# Patient Record
Sex: FEMALE | Race: WHITE | NOT HISPANIC OR LATINO | Employment: FULL TIME | URBAN - METROPOLITAN AREA
[De-identification: names, ages, dates, MRNs, and addresses within clinical notes are randomized per-mention and may not be internally consistent; named-entity substitution may affect disease eponyms.]

---

## 2017-04-25 ENCOUNTER — GENERIC CONVERSION - ENCOUNTER (OUTPATIENT)
Dept: OTHER | Facility: OTHER | Age: 52
End: 2017-04-25

## 2018-01-09 NOTE — MISCELLANEOUS
Message   Recorded as Task   Date: 08/30/2016 01:04 PM, Created By: Doreen Sanches   Task Name: Go to Result   [facility]   Regarding Patient: Holly Callaway, Status: In Progress   Comment:    Doreen Sanches - 30 Aug 2016 1:04 PM     TASK CREATED  pt needs colposcopy, has ASCUS and + HPV 16 ( high risk type of HPV)   Chiquita Palmer - 30 Aug 2016 1:49 PM     TASK IN PROGRESS        Active Problems    1  Encounter for annual routine gynecological examination (V72 31) (Z01 419)   2  Encounter for routine gynecological examination (V72 31) (Z01 419)   3  History of self breast exam   4  Screening for HPV (human papillomavirus) (V73 81) (Z11 51)   5  Urinary urgency (788 63) (R39 15)   6  Visit for screening mammogram (V76 12) (Z12 31)   7  Vulvar itching (698 1) (L29 2)    Current Meds   1  PreviDent 5000 Dry Mouth 1 1 % PSTE; Therapy: 17HBL0559 to (Last Rx:11Nov2011)  Requested for: 13OLQ5131 Ordered   2  SEROquel 300 MG Oral Tablet (QUEtiapine Fumarate); Therapy: 05RBL9708 to (Last Rx:24Oct2011)  Requested for: 66Dje5992 Ordered   3  SUMAtriptan Succinate 100 MG Oral Tablet; Therapy: 86GQO8804 to (Last UA:32EMF5506)  Requested for: 02EUC2629 Ordered   4  Travatan Z 0 004 % Ophthalmic Solution; Therapy: 02IGF2369 to (Last Rx:25Nov2011)  Requested for: 27KFY6365 Ordered   5  Venlafaxine HCl  MG Oral Capsule Extended Release 24 Hour; Therapy: 36BBG4921 to (Last Rx:17Oct2011)  Requested for: 87Apb0512 Ordered   6  Zaleplon 10 MG Oral Capsule; Therapy: (Recorded:50Jfo7069) to Recorded    Allergies    1   No Known Drug Allergies    Signatures   Electronically signed by : Madhu Quick, ; Aug 30 2016  3:35PM EST                       (Author)

## 2018-01-18 NOTE — MISCELLANEOUS
Message   Recorded as Task   Date: 10/27/2016 12:39 PM, Created By: Joss Garcia   Task Name: Go to Result   Assigned To: Consuelo Villavicencio   Regarding Patient: Gosia Green, Status: In Progress   Comment:    Joss Garcia - 27 Oct 2016 12:39 PM     TASK CREATED  normal colposcopy, needs repeat pap and HPV in 1 year   Chiquita Palmer - 27 Oct 2016 2:40 PM     TASK IN PROGRESS   Pt informed  Active Problems    1  Cervical high risk HPV (human papillomavirus) test positive (795 05) (R87 810)   2  Encounter for annual routine gynecological examination (V72 31) (Z01 419)   3  Encounter for routine gynecological examination (V72 31) (Z01 419)   4  History of self breast exam   5  Need for immunization against influenza (V04 81) (Z23)   6  Screening for HPV (human papillomavirus) (V73 81) (Z11 51)   7  Urinary urgency (788 63) (R39 15)   8  Visit for screening mammogram (V76 12) (Z12 31)   9  Vulvar itching (698 1) (L29 2)    Current Meds   1  Nystatin-Triamcinolone 436442-8 1 UNIT/GM-% External Cream; apply sparingly BID for   1 week; Therapy: 21GBC3324 to (Rhona Rice)  Requested for: 22LOM5415; Last   Rx:69Sdy0424 Ordered   2  PreviDent 5000 Dry Mouth 1 1 % PSTE; Therapy: 03HRT9099 to (Last Rx:31Qdo7000)  Requested for: 65PJN8148 Ordered   3  SEROquel 300 MG Oral Tablet (QUEtiapine Fumarate); Therapy: 27AHP0748 to (Last Rx:24Oct2011)  Requested for: 55Phw9287 Ordered   4  SUMAtriptan Succinate 100 MG Oral Tablet; Therapy: 12NVZ3626 to (Last SZ:68ECL3666)  Requested for: 44ICU3695 Ordered   5  Travatan Z 0 004 % Ophthalmic Solution; Therapy: 07ZGV1053 to (Last Rx:25Nov2011)  Requested for: 33KHQ0512 Ordered   6  Venlafaxine HCl  MG Oral Capsule Extended Release 24 Hour; Therapy: 52ELX7485 to (Last Rx:17Oct2011)  Requested for: 17Oct2011 Ordered   7  Zaleplon 10 MG Oral Capsule; Therapy: (Recorded:90Oox2211) to Recorded    Allergies    1   No Known Drug Allergies    Signatures Electronically signed by : Juan Baldwin, ; Oct 28 2016  2:29PM EST                       (Author)

## 2020-08-20 ENCOUNTER — OFFICE VISIT (OUTPATIENT)
Dept: GASTROENTEROLOGY | Facility: MEDICAL CENTER | Age: 55
End: 2020-08-20
Payer: COMMERCIAL

## 2020-08-20 VITALS
WEIGHT: 246.4 LBS | SYSTOLIC BLOOD PRESSURE: 142 MMHG | BODY MASS INDEX: 41.97 KG/M2 | DIASTOLIC BLOOD PRESSURE: 82 MMHG | HEART RATE: 71 BPM | TEMPERATURE: 98.4 F

## 2020-08-20 DIAGNOSIS — Z12.11 SCREENING FOR COLON CANCER: ICD-10-CM

## 2020-08-20 DIAGNOSIS — K59.00 CONSTIPATION, UNSPECIFIED CONSTIPATION TYPE: Primary | ICD-10-CM

## 2020-08-20 PROCEDURE — 99244 OFF/OP CNSLTJ NEW/EST MOD 40: CPT | Performed by: INTERNAL MEDICINE

## 2020-08-20 RX ORDER — ROSUVASTATIN CALCIUM 5 MG/1
TABLET, COATED ORAL DAILY
COMMUNITY
Start: 2020-06-22 | End: 2021-02-06 | Stop reason: ALTCHOICE

## 2020-08-20 RX ORDER — TRAVOPROST 0.004 %
DROPS OPHTHALMIC (EYE)
COMMUNITY
Start: 2020-06-24 | End: 2021-09-10 | Stop reason: HOSPADM

## 2020-08-20 RX ORDER — QUETIAPINE FUMARATE 400 MG/1
400 TABLET, FILM COATED ORAL
COMMUNITY
Start: 2020-08-16

## 2020-08-20 RX ORDER — PHENOL 1.4 %
AEROSOL, SPRAY (ML) MUCOUS MEMBRANE
COMMUNITY

## 2020-08-20 RX ORDER — ZALEPLON 10 MG/1
10 CAPSULE ORAL
COMMUNITY

## 2020-08-20 RX ORDER — SUMATRIPTAN 100 MG/1
100 TABLET, FILM COATED ORAL ONCE AS NEEDED
COMMUNITY
Start: 2020-08-16

## 2020-08-20 RX ORDER — SODIUM, POTASSIUM,MAG SULFATES 17.5-3.13G
1 SOLUTION, RECONSTITUTED, ORAL ORAL ONCE
Qty: 1 BOTTLE | Refills: 0 | Status: SHIPPED | OUTPATIENT
Start: 2020-08-20 | End: 2020-08-21 | Stop reason: SDUPTHER

## 2020-08-20 RX ORDER — VENLAFAXINE HYDROCHLORIDE 150 MG/1
150 CAPSULE, EXTENDED RELEASE ORAL DAILY
COMMUNITY
Start: 2011-10-17

## 2020-08-20 RX ORDER — SODIUM FLUORIDE 6.1 MG/ML
GEL, DENTIFRICE DENTAL
COMMUNITY
Start: 2020-06-22

## 2020-08-20 RX ORDER — ECHINACEA 400 MG
2000 CAPSULE ORAL
COMMUNITY

## 2020-08-20 NOTE — PROGRESS NOTES
[facility] - Outpatient Consultation  Gene Shaw 48 y o  female MRN: [id number]  Encounter: [id number]          ASSESSMENT AND PLAN:     Gene Shaw is a 48 y o  female who presents with complaint of constipation and need for colonoscopy  She has long-standing constipation and takes herbal laxatives with senna  She never had a screening colonoscopy  Available labs and imaging reviewed  1  Constipation, unspecified constipation type    2  Screening for colon cancer        Orders Placed This Encounter   Procedures    Colonoscopy     Okay to continue laxatives for constipation  We discussed trial of Miralax instead of the laxative to see if this helps  Increase fluids  Okay to trial fiber supplement as well  Will schedule a screening colonoscopy    ______________________________________________________________________    Referred by: Dr Colletta Crow for constipation and CRC screening    HPI:    Gene Shaw is a 48 y o  female who presents with complaint of constipation  No heartburn, dysphagia, odynophagia, nausea, vomiting, diarrhea  She has constipation and she needs to take a laxative (maybe 2-3 times per week), and with a laxative almost every day or every other day  She takes a herbal laxative  She thinks she tried Miralax in the past  Occasional blood when she wipes  No BRBPR in the bowl/stool, melena  Occasional abdominal discomfort when she is constipated (can be the general abdomen)  No weight loss  No prior colonoscopy  no prior EGD  + FH of colon cancer in her great grandmother or other GI related issues        REVIEW OF SYSTEMS:  10 point ROS reviewed and negative, except as above      Historical Information   History reviewed  No pertinent past medical history  History reviewed  No pertinent surgical history    Social History   Social History     Substance and Sexual Activity   Alcohol Use Not Currently     Social History     Substance and Sexual Activity Drug Use Never     Social History     Tobacco Use   Smoking Status Never Smoker   Smokeless Tobacco Never Used     History reviewed  No pertinent family history  Meds/Allergies       Current Outpatient Medications:     Flaxseed, Linseed, (Flaxseed Oil) 1000 MG CAPS    Melatonin 10 MG TABS    Nutritional Supplements (GRAPESEED EXTRACT PO)    Omega-3 Fatty Acids (Fish Oil) 1200 MG CPDR    PreviDent 5000 Dry Mouth 1 1 % GEL    QUEtiapine (SEROquel) 400 MG tablet    rosuvastatin (CRESTOR) 5 mg tablet    SUMAtriptan (IMITREX) 100 mg tablet    Travatan Z 0 004 % ophthalmic solution    venlafaxine (EFFEXOR-XR) 150 mg 24 hr capsule    zaleplon (SONATA) 10 MG capsule    Na Sulfate-K Sulfate-Mg Sulf (Suprep Bowel Prep Kit) 17 5-3 13-1 6 GM/177ML SOLN    No Known Allergies        Objective     Blood pressure 142/82, pulse 71, temperature 98 4 °F (36 9 °C), weight 112 kg (246 lb 6 4 oz)  Body mass index is 41 97 kg/m²  PHYSICAL EXAMINATION:    General Appearance:   Alert, cooperative, no distress   HEENT:  Normocephalic, atraumatic, anicteric  Neck supple, symmetrical, trachea midline  Lungs:   Equal chest rise and unlabored breathing, normal effort, no coughing  Cardiovascular:   No visualized JVD  Abdomen:   No abdominal distension  Skin:   No jaundice, rashes, or lesions  Musculoskeletal:   Normal range of motion visualized  Psych:  Normal affect and normal insight  Neuro:  Alert and appropriate  Lab Results:   No visits with results within 1 Day(s) from this visit     Latest known visit with results is:   Lab Requisition on 10/20/2016   Component Date Value    Case Report 10/20/2016                      Value:Surgical Pathology Report                         Case: [id number]                                   Authorizing Provider:  Rick Lamas DO            Collected:           10/20/2016 1459              Pathologist:           Anam Ravi MD         Received: 10/24/2016 1208              Specimens:   A) - Endocervical, Endocervical curetting                                                           B) - Cervix, Cervix, 1 o'clock                                                                      C) - Cervix, Cervix, 7 o'clock                                                                      D) - Cervix, Cervix, 11 o'clock                                                            Final Diagnosis 10/20/2016                      Value: This result contains rich text formatting which cannot be displayed here   Additional Information 10/20/2016                      Value: This result contains rich text formatting which cannot be displayed here  [facility] Description 10/20/2016                      Value: This result contains rich text formatting which cannot be displayed here  No results found for: WBC, HGB, HCT, MCV, PLT    No results found for: NA, SODIUM, K, CL, CO2, ANIONGAP, AGAP, BUN, CREATININE, GLUC, GLUF, CALCIUM, AST, ALT, ALKPHOS, PROT, TP, BILITOT, TBILI, EGFR    No results found for: CRP    No results found for: [id number], TSH    No results found for: IRON, TIBC, FERRITIN    Radiology Results:   No results found

## 2020-08-20 NOTE — PATIENT INSTRUCTIONS
Pt is scheduled at [address] [facility] with dr Myron Lu for colon on 9/3/20, ma went over suprep with pt and she has blue folder with instructions  Patient is aware she will need a  to and from   She will get a call the day before with an exact time for arrival

## 2020-08-20 NOTE — H&P (VIEW-ONLY)
[facility] - Outpatient Consultation  Maribel Duenas 48 y o  female MRN: [id number]  Encounter: [id number]          ASSESSMENT AND PLAN:     Maribel Duenas is a 48 y o  female who presents with complaint of constipation and need for colonoscopy  She has long-standing constipation and takes herbal laxatives with senna  She never had a screening colonoscopy  Available labs and imaging reviewed  1  Constipation, unspecified constipation type    2  Screening for colon cancer        Orders Placed This Encounter   Procedures    Colonoscopy     Okay to continue laxatives for constipation  We discussed trial of Miralax instead of the laxative to see if this helps  Increase fluids  Okay to trial fiber supplement as well  Will schedule a screening colonoscopy    ______________________________________________________________________    Referred by: Dr Tray Love for constipation and CRC screening    HPI:    Maribel Duenas is a 48 y o  female who presents with complaint of constipation  No heartburn, dysphagia, odynophagia, nausea, vomiting, diarrhea  She has constipation and she needs to take a laxative (maybe 2-3 times per week), and with a laxative almost every day or every other day  She takes a herbal laxative  She thinks she tried Miralax in the past  Occasional blood when she wipes  No BRBPR in the bowl/stool, melena  Occasional abdominal discomfort when she is constipated (can be the general abdomen)  No weight loss  No prior colonoscopy  no prior EGD  + FH of colon cancer in her great grandmother or other GI related issues        REVIEW OF SYSTEMS:  10 point ROS reviewed and negative, except as above      Historical Information   History reviewed  No pertinent past medical history  History reviewed  No pertinent surgical history    Social History   Social History     Substance and Sexual Activity   Alcohol Use Not Currently     Social History     Substance and Sexual Activity Drug Use Never     Social History     Tobacco Use   Smoking Status Never Smoker   Smokeless Tobacco Never Used     History reviewed  No pertinent family history  Meds/Allergies       Current Outpatient Medications:     Flaxseed, Linseed, (Flaxseed Oil) 1000 MG CAPS    Melatonin 10 MG TABS    Nutritional Supplements (GRAPESEED EXTRACT PO)    Omega-3 Fatty Acids (Fish Oil) 1200 MG CPDR    PreviDent 5000 Dry Mouth 1 1 % GEL    QUEtiapine (SEROquel) 400 MG tablet    rosuvastatin (CRESTOR) 5 mg tablet    SUMAtriptan (IMITREX) 100 mg tablet    Travatan Z 0 004 % ophthalmic solution    venlafaxine (EFFEXOR-XR) 150 mg 24 hr capsule    zaleplon (SONATA) 10 MG capsule    Na Sulfate-K Sulfate-Mg Sulf (Suprep Bowel Prep Kit) 17 5-3 13-1 6 GM/177ML SOLN    No Known Allergies        Objective     Blood pressure 142/82, pulse 71, temperature 98 4 °F (36 9 °C), weight 112 kg (246 lb 6 4 oz)  Body mass index is 41 97 kg/m²  PHYSICAL EXAMINATION:    General Appearance:   Alert, cooperative, no distress   HEENT:  Normocephalic, atraumatic, anicteric  Neck supple, symmetrical, trachea midline  Lungs:   Equal chest rise and unlabored breathing, normal effort, no coughing  Cardiovascular:   No visualized JVD  Abdomen:   No abdominal distension  Skin:   No jaundice, rashes, or lesions  Musculoskeletal:   Normal range of motion visualized  Psych:  Normal affect and normal insight  Neuro:  Alert and appropriate  Lab Results:   No visits with results within 1 Day(s) from this visit     Latest known visit with results is:   Lab Requisition on 10/20/2016   Component Date Value    Case Report 10/20/2016                      Value:Surgical Pathology Report                         Case: [id number]                                   Authorizing Provider:  Jerry Cloud DO            Collected:           10/20/2016 1459              Pathologist:           Igor Vann MD         Received: 10/24/2016 1208              Specimens:   A) - Endocervical, Endocervical curetting                                                           B) - Cervix, Cervix, 1 o'clock                                                                      C) - Cervix, Cervix, 7 o'clock                                                                      D) - Cervix, Cervix, 11 o'clock                                                            Final Diagnosis 10/20/2016                      Value: This result contains rich text formatting which cannot be displayed here   Additional Information 10/20/2016                      Value: This result contains rich text formatting which cannot be displayed here  [facility] Description 10/20/2016                      Value: This result contains rich text formatting which cannot be displayed here  No results found for: WBC, HGB, HCT, MCV, PLT    No results found for: NA, SODIUM, K, CL, CO2, ANIONGAP, AGAP, BUN, CREATININE, GLUC, GLUF, CALCIUM, AST, ALT, ALKPHOS, PROT, TP, BILITOT, TBILI, EGFR    No results found for: CRP    No results found for: [id number], TSH    No results found for: IRON, TIBC, FERRITIN    Radiology Results:   No results found

## 2020-08-21 ENCOUNTER — TELEPHONE (OUTPATIENT)
Dept: GASTROENTEROLOGY | Facility: CLINIC | Age: 55
End: 2020-08-21

## 2020-08-21 DIAGNOSIS — Z12.11 SCREENING FOR COLON CANCER: ICD-10-CM

## 2020-08-21 RX ORDER — SODIUM, POTASSIUM,MAG SULFATES 17.5-3.13G
SOLUTION, RECONSTITUTED, ORAL ORAL
Qty: 2 BOTTLE | Refills: 0 | Status: SHIPPED | OUTPATIENT
Start: 2020-08-21 | End: 2020-09-03 | Stop reason: HOSPADM

## 2020-08-21 NOTE — TELEPHONE ENCOUNTER
Patients GI provider:  Dr Merrell Kayser    Number to return call: [phone]    Reason for call: Pt calling stating her suprep should be sent to [facility] pharmacy that is currently on file   Please resend    Scheduled procedure/appointment date if applicable: Procedure - 09/03/20

## 2020-09-02 ENCOUNTER — ANESTHESIA EVENT (OUTPATIENT)
Dept: GASTROENTEROLOGY | Facility: HOSPITAL | Age: 55
End: 2020-09-02

## 2020-09-03 ENCOUNTER — HOSPITAL ENCOUNTER (OUTPATIENT)
Dept: GASTROENTEROLOGY | Facility: HOSPITAL | Age: 55
Setting detail: OUTPATIENT SURGERY
Discharge: HOME | End: 2020-09-03
Attending: INTERNAL MEDICINE
Payer: COMMERCIAL

## 2020-09-03 ENCOUNTER — ANESTHESIA (OUTPATIENT)
Dept: GASTROENTEROLOGY | Facility: HOSPITAL | Age: 55
End: 2020-09-03

## 2020-09-03 VITALS
BODY MASS INDEX: 42 KG/M2 | OXYGEN SATURATION: 98 % | SYSTOLIC BLOOD PRESSURE: 167 MMHG | HEART RATE: 65 BPM | TEMPERATURE: 98.6 F | WEIGHT: 246 LBS | DIASTOLIC BLOOD PRESSURE: 91 MMHG | RESPIRATION RATE: 16 BRPM | HEIGHT: 64 IN

## 2020-09-03 VITALS — HEART RATE: 73 BPM

## 2020-09-03 DIAGNOSIS — Z12.11 SCREENING FOR COLON CANCER: ICD-10-CM

## 2020-09-03 PROBLEM — G43.909 MIGRAINE: Status: ACTIVE | Noted: 2020-09-03

## 2020-09-03 PROBLEM — E78.5 HYPERLIPIDEMIA: Status: ACTIVE | Noted: 2020-09-03

## 2020-09-03 PROBLEM — H40.9 GLAUCOMA: Status: ACTIVE | Noted: 2020-09-03

## 2020-09-03 PROBLEM — E66.01 MORBID OBESITY WITH BMI OF 40.0-44.9, ADULT (HCC): Status: ACTIVE | Noted: 2020-09-03

## 2020-09-03 PROBLEM — F41.9 ANXIETY: Status: ACTIVE | Noted: 2020-09-03

## 2020-09-03 PROBLEM — F32.A DEPRESSION: Status: ACTIVE | Noted: 2020-09-03

## 2020-09-03 LAB
EXT PREGNANCY TEST URINE: NEGATIVE
EXT. CONTROL: NORMAL

## 2020-09-03 PROCEDURE — 88305 TISSUE EXAM BY PATHOLOGIST: CPT | Performed by: PATHOLOGY

## 2020-09-03 PROCEDURE — 81025 URINE PREGNANCY TEST: CPT | Performed by: ANESTHESIOLOGY

## 2020-09-03 PROCEDURE — 45385 COLONOSCOPY W/LESION REMOVAL: CPT | Performed by: INTERNAL MEDICINE

## 2020-09-03 RX ORDER — DOCUSATE SODIUM 100 MG/1
100 CAPSULE, LIQUID FILLED ORAL DAILY
COMMUNITY
End: 2021-09-10 | Stop reason: HOSPADM

## 2020-09-03 RX ORDER — PROPOFOL 10 MG/ML
INJECTION, EMULSION INTRAVENOUS AS NEEDED
Status: DISCONTINUED | OUTPATIENT
Start: 2020-09-03 | End: 2020-09-03

## 2020-09-03 RX ORDER — LABETALOL 20 MG/4 ML (5 MG/ML) INTRAVENOUS SYRINGE
AS NEEDED
Status: DISCONTINUED | OUTPATIENT
Start: 2020-09-03 | End: 2020-09-03

## 2020-09-03 RX ORDER — SODIUM CHLORIDE 9 MG/ML
125 INJECTION, SOLUTION INTRAVENOUS CONTINUOUS
Status: DISCONTINUED | OUTPATIENT
Start: 2020-09-03 | End: 2020-09-07 | Stop reason: HOSPADM

## 2020-09-03 RX ORDER — SODIUM CHLORIDE 9 MG/ML
INJECTION, SOLUTION INTRAVENOUS CONTINUOUS PRN
Status: DISCONTINUED | OUTPATIENT
Start: 2020-09-03 | End: 2020-09-03

## 2020-09-03 RX ADMIN — LABETALOL 20 MG/4 ML (5 MG/ML) INTRAVENOUS SYRINGE 10 MG: at 12:09

## 2020-09-03 RX ADMIN — LABETALOL 20 MG/4 ML (5 MG/ML) INTRAVENOUS SYRINGE 5 MG: at 12:23

## 2020-09-03 RX ADMIN — PROPOFOL 50 MG: 10 INJECTION, EMULSION INTRAVENOUS at 12:24

## 2020-09-03 RX ADMIN — PROPOFOL 50 MG: 10 INJECTION, EMULSION INTRAVENOUS at 12:28

## 2020-09-03 RX ADMIN — PROPOFOL 50 MG: 10 INJECTION, EMULSION INTRAVENOUS at 12:45

## 2020-09-03 RX ADMIN — LIDOCAINE HYDROCHLORIDE 100 MG: 20 INJECTION, SOLUTION INTRAVENOUS at 12:12

## 2020-09-03 RX ADMIN — PROPOFOL 50 MG: 10 INJECTION, EMULSION INTRAVENOUS at 12:16

## 2020-09-03 RX ADMIN — PROPOFOL 150 MG: 10 INJECTION, EMULSION INTRAVENOUS at 12:12

## 2020-09-03 RX ADMIN — PROPOFOL 50 MG: 10 INJECTION, EMULSION INTRAVENOUS at 12:36

## 2020-09-03 RX ADMIN — PROPOFOL 50 MG: 10 INJECTION, EMULSION INTRAVENOUS at 12:40

## 2020-09-03 RX ADMIN — SODIUM CHLORIDE: 0.9 INJECTION, SOLUTION INTRAVENOUS at 11:08

## 2020-09-03 RX ADMIN — PROPOFOL 50 MG: 10 INJECTION, EMULSION INTRAVENOUS at 12:20

## 2020-09-03 RX ADMIN — SODIUM CHLORIDE: 0.9 INJECTION, SOLUTION INTRAVENOUS at 12:07

## 2020-09-03 NOTE — ANESTHESIA PREPROCEDURE EVALUATION
Procedure:  COLONOSCOPY    Relevant Problems   CARDIO   (+) Hyperlipidemia   (+) Migraine      NEURO/PSYCH   (+) Anxiety   (+) Depression      Other   (+) Glaucoma   (+) Morbid obesity with BMI of 40 0-44 9, adult [facility])        Physical Exam    Airway    Mallampati score: III  TM Distance: >3 FB  Neck ROM: full     Dental   No notable dental hx     Cardiovascular  Rhythm: regular, Rate: normal, Cardiovascular exam normal    Pulmonary  Pulmonary exam normal Breath sounds clear to auscultation,     Other Findings        Anesthesia Plan  ASA Score- 3     Anesthesia Type- general and IV sedation with anesthesia with ASA Monitors  Additional Monitors:   Airway Plan:           Plan Factors-    Chart reviewed  Patient summary reviewed  Patient is not a current smoker  Patient instructed to abstain from smoking on day of procedure  Patient did not smoke on day of surgery  Induction- intravenous  Postoperative Plan-     Informed Consent- Anesthetic plan and risks discussed with patient  I personally reviewed this patient with the CRNA  Discussed and agreed on the Anesthesia Plan with the CRNA Gerhardt Sep

## 2020-09-03 NOTE — DISCHARGE INSTRUCTIONS
Colorectal Polyps   WHAT YOU NEED TO KNOW:   Colorectal polyps are small growths of tissue in the lining of the colon and rectum  Most polyps are hyperplastic polyps and are usually benign (noncancerous)  Certain types of polyps, called adenomatous polyps, may turn into cancer  DISCHARGE INSTRUCTIONS:   Follow up with your healthcare provider or gastroenterologist as directed: You may need to return for more tests, such as another colonoscopy  Write down your questions so you remember to ask them during your visits  Reduce your risk for colorectal polyps:   · Eat a variety of healthy foods:  Healthy foods include fruit, vegetables, whole-grain breads, low-fat dairy products, beans, lean meat, and fish  Ask if you need to be on a special diet  · Maintain a healthy weight:  Ask your healthcare provider if you need to lose weight and how much you need to lose  Ask for help with a weight loss program     · Exercise:  Begin to exercise slowly and do more as you get stronger  Talk with your healthcare provider before you start an exercise program      · Limit alcohol:  Your risk for polyps increases the more you drink  · Do not smoke: If you smoke, it is never too late to quit  Ask for information about how to stop  For support and more information:   · Syd Esparza ([facility])  [address], [place] [address]  Phone: [phone] 501 [phone]  Web Address: www digestive  niddk nih gov  Contact your healthcare provider or gastroenterologist if:   · You have a fever  · You have chills, a cough, or feel weak and achy  · You have abdominal pain that does not go away or gets worse after you take medicine  · Your abdomen is swollen  · You are losing weight without trying  · You have questions or concerns about your condition or care  Seek care immediately or call 911 if:   · You have sudden shortness of breath       · You have a fast heart rate, fast breathing, or are too dizzy to stand up  · You have severe abdominal pain  · You see blood in your bowel movement  © 2017 [facility] Information is for End User's use only and may not be sold, redistributed or otherwise used for commercial purposes  All illustrations and images included in CareNotes® are the copyrighted property of A D A M , Inc  or Francois Duran  The above information is an  only  It is not intended as medical advice for individual conditions or treatments  Talk to your doctor, nurse or pharmacist before following any medical regimen to see if it is safe and effective for you

## 2020-09-03 NOTE — INTERVAL H&P NOTE
H&P reviewed  After examining the patient I find no changes in the patients condition since the H&P had been written      Vitals:    09/03/20 1051   BP: 147/93   Pulse: 79   Resp: 18   Temp: 98 6 °F (37 °C)   SpO2: 96%

## 2020-10-08 ENCOUNTER — ANNUAL EXAM (OUTPATIENT)
Dept: OBGYN CLINIC | Facility: CLINIC | Age: 55
End: 2020-10-08
Payer: COMMERCIAL

## 2020-10-08 VITALS
BODY MASS INDEX: 41.83 KG/M2 | DIASTOLIC BLOOD PRESSURE: 70 MMHG | SYSTOLIC BLOOD PRESSURE: 140 MMHG | WEIGHT: 245 LBS | TEMPERATURE: 97.6 F | HEIGHT: 64 IN

## 2020-10-08 DIAGNOSIS — B37.3 VULVAR CANDIDIASIS: ICD-10-CM

## 2020-10-08 DIAGNOSIS — Z01.419 ENCOUNTER FOR ANNUAL ROUTINE GYNECOLOGICAL EXAMINATION: Primary | ICD-10-CM

## 2020-10-08 DIAGNOSIS — Z12.31 ENCOUNTER FOR SCREENING MAMMOGRAM FOR BREAST CANCER: ICD-10-CM

## 2020-10-08 DIAGNOSIS — R87.610 ASCUS WITH POSITIVE HIGH RISK HPV CERVICAL: ICD-10-CM

## 2020-10-08 DIAGNOSIS — R87.810 ASCUS WITH POSITIVE HIGH RISK HPV CERVICAL: ICD-10-CM

## 2020-10-08 PROCEDURE — 88141 CYTOPATH C/V INTERPRET: CPT | Performed by: PATHOLOGY

## 2020-10-08 PROCEDURE — 87624 HPV HI-RISK TYP POOLED RSLT: CPT | Performed by: OBSTETRICS & GYNECOLOGY

## 2020-10-08 PROCEDURE — S0612 ANNUAL GYNECOLOGICAL EXAMINA: HCPCS | Performed by: OBSTETRICS & GYNECOLOGY

## 2020-10-08 PROCEDURE — G0145 SCR C/V CYTO,THINLAYER,RESCR: HCPCS | Performed by: PATHOLOGY

## 2020-10-08 RX ORDER — CLOTRIMAZOLE AND BETAMETHASONE DIPROPIONATE 10; .64 MG/G; MG/G
CREAM TOPICAL 2 TIMES DAILY
Qty: 30 G | Refills: 0 | Status: SHIPPED | OUTPATIENT
Start: 2020-10-08 | End: 2021-02-06 | Stop reason: ALTCHOICE

## 2020-10-12 LAB
HPV HR 12 DNA CVX QL NAA+PROBE: NEGATIVE
HPV16 DNA CVX QL NAA+PROBE: POSITIVE
HPV18 DNA CVX QL NAA+PROBE: NEGATIVE

## 2020-10-13 DIAGNOSIS — Z12.31 ENCOUNTER FOR SCREENING MAMMOGRAM FOR BREAST CANCER: ICD-10-CM

## 2020-10-16 LAB
LAB AP GYN PRIMARY INTERPRETATION: ABNORMAL
Lab: ABNORMAL
PATH INTERP SPEC-IMP: ABNORMAL

## 2020-12-18 ENCOUNTER — PROCEDURE VISIT (OUTPATIENT)
Dept: OBGYN CLINIC | Facility: CLINIC | Age: 55
End: 2020-12-18
Payer: COMMERCIAL

## 2020-12-18 VITALS — WEIGHT: 244.8 LBS | DIASTOLIC BLOOD PRESSURE: 90 MMHG | BODY MASS INDEX: 41.69 KG/M2 | SYSTOLIC BLOOD PRESSURE: 148 MMHG

## 2020-12-18 DIAGNOSIS — R87.810 ASCUS WITH POSITIVE HIGH RISK HPV CERVICAL: Primary | ICD-10-CM

## 2020-12-18 DIAGNOSIS — R87.610 ASCUS WITH POSITIVE HIGH RISK HPV CERVICAL: Primary | ICD-10-CM

## 2020-12-18 DIAGNOSIS — L81.9 PIGMENTED SKIN LESION: ICD-10-CM

## 2020-12-18 DIAGNOSIS — N90.89 VULVAR LESION: ICD-10-CM

## 2020-12-18 PROCEDURE — 88305 TISSUE EXAM BY PATHOLOGIST: CPT | Performed by: PATHOLOGY

## 2020-12-18 PROCEDURE — 56605 BIOPSY OF VULVA/PERINEUM: CPT | Performed by: OBSTETRICS & GYNECOLOGY

## 2020-12-18 PROCEDURE — 57454 BX/CURETT OF CERVIX W/SCOPE: CPT | Performed by: OBSTETRICS & GYNECOLOGY

## 2021-02-06 RX ORDER — LOVASTATIN 40 MG/1
40 TABLET ORAL EVERY EVENING
COMMUNITY
Start: 2020-12-15

## 2021-02-06 NOTE — PRE-PROCEDURE INSTRUCTIONS
Pre-Surgery Instructions:   Medication Instructions    docusate sodium (COLACE) 100 mg capsule Instructed patient per Anesthesia Guidelines  continue, do not take dos    Flaxseed, Linseed, (Flaxseed Oil) 1000 MG CAPS Instructed patient per Anesthesia Guidelines  continue, do not take dos    lovastatin (MEVACOR) 40 MG tablet Instructed patient per Anesthesia Guidelines  continue, may take dos    Melatonin 10 MG TABS Instructed patient per Anesthesia Guidelines  continue, do not take [address] Instructed patient per Anesthesia Guidelines  continue, do not take [address] Instructed patient per Anesthesia Guidelines  continue, do not take dos    Nutritional Supplements (GRAPESEED EXTRACT PO) Instructed patient per Anesthesia Guidelines  continue, do not take dos    Omega-3 Fatty Acids (Fish Oil) 1200 MG CPDR Instructed patient per Anesthesia Guidelines  continue, do not take dos    PreviDent 5000 Dry Mouth 1 1 % GEL Instructed patient per Anesthesia Guidelines  continue, do not use dos    QUEtiapine (SEROquel) 400 MG tablet Instructed patient per Anesthesia Guidelines  continue, take at bedtime as usual    SUMAtriptan (IMITREX) 100 mg tablet Instructed patient per Anesthesia Guidelines  continue if needed    Travatan Z 0 004 % ophthalmic solution Instructed patient per Anesthesia Guidelines   venlafaxine (EFFEXOR-XR) 150 mg 24 hr capsule Instructed patient per Anesthesia Guidelines  continue, may take dos    zaleplon (SONATA) 10 MG capsule Instructed patient per Anesthesia Guidelines  continue, take bedtime as ususal    Education Index    Med Instructions Troubleshoot   Antidepressant Med Class    Continue to take this medication on your normal schedule  If this is an oral medication and you take it in the morning, then you may take this medicine with a sip of water  Antipsychotic Med Class    Continue to take this medication on your normal schedule    If this is an oral medication and you take it in the morning, then you may take this medicine with a sip of water  Herbal Med Class    Stop taking this herbal medications at least one week prior to surgery/procedure  Patient was not instructed to stop any vitamins/supllements  Patient wishes to continue them  Statin Med Class    Continue to take this medication on your normal schedule  If this is an oral medication and you take it in the morning, then you may take this medicine with a sip of water  Stool Softener Med Class    Continue to take this medication on your normal schedule  If this is an oral medication and you take it in the morning, then you may take this medicine with a sip of water  Zolpidem Med Class    Continue this medication up to the evening before surgery/procedure, but do not take the morning of the day of surgery  You will receive a phone call from hospital for arrival time  Please call surgeons office if any changes in your condition  Wear easy on/off clothing; consider type of surgery;  valuables and jewelry please keep at home  **COVID-19  education done  Please: No contacts or eye make up or artificial eyelashes    Please bring special ordered sling or braces if needed for  Your particular surgery  Please secure transportation     Follow pre surgery showering or cleaning instructions as  Reviewed by nurse or surgeons office      Questions answered and concerns addressed

## 2021-02-08 ENCOUNTER — ANESTHESIA EVENT (OUTPATIENT)
Dept: PERIOP | Facility: HOSPITAL | Age: 56
End: 2021-02-08
Payer: COMMERCIAL

## 2021-02-08 NOTE — ANESTHESIA PREPROCEDURE EVALUATION
Procedure:  PHACO W/IOL & LRI (Left Eye)    Relevant Problems   CARDIO  pt BP elevated likely from anxiety  Pt had colonoscopy 9/20 hypertensive was treated with IV labetelol  No complicaiton  I spoke with pt's PMD stated she can proceed with surgery will treat her BP  and pt will follow up with her tomorrow am  in her office     (+) Hyperlipidemia   (+) Migraine   (-) Chest pain      GI/HEPATIC   (-) Gastroesophageal reflux disease      NEURO/PSYCH   (+) Anxiety   (+) Depression   (+) Headache (pt had HA this am took imitrex her HA has resolved  No Nausea, vomting, or vistion changes, no signs or symptoms of near syncompe)      Other   (+) Glaucoma   (+) Morbid obesity with BMI of 40 0-44 9, adult [facility])        Physical Exam    Airway    Mallampati score: III  TM Distance: >3 FB  Neck ROM: full     Dental   No notable dental hx     Cardiovascular  Cardiovascular exam normal    Pulmonary  Pulmonary exam normal     Other Findings        Anesthesia Plan  ASA Score- 2     Anesthesia Type- IV sedation with anesthesia with ASA Monitors  Additional Monitors:   Airway Plan:           Plan Factors-    Chart reviewed  Induction-     Postoperative Plan-     Informed Consent- Anesthetic plan and risks discussed with patient  I personally reviewed this patient with the CRNA  Discussed and agreed on the Anesthesia Plan with the CRNA  Emir Beckford

## 2021-02-09 ENCOUNTER — ANESTHESIA (OUTPATIENT)
Dept: PERIOP | Facility: HOSPITAL | Age: 56
End: 2021-02-09
Payer: COMMERCIAL

## 2021-02-09 ENCOUNTER — ANESTHESIA EVENT (OUTPATIENT)
Dept: PERIOP | Facility: HOSPITAL | Age: 56
End: 2021-02-09
Payer: COMMERCIAL

## 2021-02-09 ENCOUNTER — HOSPITAL ENCOUNTER (OUTPATIENT)
Facility: HOSPITAL | Age: 56
Setting detail: OUTPATIENT SURGERY
Discharge: HOME | End: 2021-02-09
Attending: OPHTHALMOLOGY | Admitting: OPHTHALMOLOGY
Payer: COMMERCIAL

## 2021-02-09 VITALS
TEMPERATURE: 98.1 F | DIASTOLIC BLOOD PRESSURE: 65 MMHG | WEIGHT: 245 LBS | HEIGHT: 64 IN | RESPIRATION RATE: 16 BRPM | BODY MASS INDEX: 41.83 KG/M2 | HEART RATE: 62 BPM | OXYGEN SATURATION: 93 % | SYSTOLIC BLOOD PRESSURE: 137 MMHG

## 2021-02-09 VITALS — HEART RATE: 62 BPM

## 2021-02-09 PROBLEM — H25.812 COMBINED FORMS OF AGE-RELATED CATARACT, LEFT EYE: Status: ACTIVE | Noted: 2021-02-09

## 2021-02-09 PROBLEM — R51.9 HEADACHE: Status: ACTIVE | Noted: 2021-02-09

## 2021-02-09 LAB
EXT PREGNANCY TEST URINE: NEGATIVE
EXT. CONTROL: NORMAL

## 2021-02-09 PROCEDURE — 81025 URINE PREGNANCY TEST: CPT | Performed by: OPHTHALMOLOGY

## 2021-02-09 PROCEDURE — V2788 PRESBYOPIA-CORRECT FUNCTION: HCPCS | Performed by: OPHTHALMOLOGY

## 2021-02-09 DEVICE — IMPLANTABLE DEVICE: Type: IMPLANTABLE DEVICE | Site: POSTERIOR CHAMBER | Status: FUNCTIONAL

## 2021-02-09 RX ORDER — HYDRALAZINE HYDROCHLORIDE 20 MG/ML
INJECTION INTRAMUSCULAR; INTRAVENOUS AS NEEDED
Status: DISCONTINUED | OUTPATIENT
Start: 2021-02-09 | End: 2021-02-09

## 2021-02-09 RX ORDER — LIDOCAINE HYDROCHLORIDE 20 MG/ML
JELLY TOPICAL AS NEEDED
Status: DISCONTINUED | OUTPATIENT
Start: 2021-02-09 | End: 2021-02-09 | Stop reason: HOSPADM

## 2021-02-09 RX ORDER — NEOMYCIN SULFATE, POLYMYXIN B SULFATE, AND DEXAMETHASONE 3.5; 10000; 1 MG/G; [USP'U]/G; MG/G
OINTMENT OPHTHALMIC AS NEEDED
Status: DISCONTINUED | OUTPATIENT
Start: 2021-02-09 | End: 2021-02-09 | Stop reason: HOSPADM

## 2021-02-09 RX ORDER — BALANCED SALT SOLUTION 6.4; .75; .48; .3; 3.9; 1.7 MG/ML; MG/ML; MG/ML; MG/ML; MG/ML; MG/ML
SOLUTION OPHTHALMIC AS NEEDED
Status: DISCONTINUED | OUTPATIENT
Start: 2021-02-09 | End: 2021-02-09 | Stop reason: HOSPADM

## 2021-02-09 RX ORDER — PHENYLEPHRINE HCL 2.5 %
1 DROPS OPHTHALMIC (EYE)
Status: DISPENSED | OUTPATIENT
Start: 2021-02-09 | End: 2021-02-09

## 2021-02-09 RX ORDER — SODIUM CHLORIDE 9 MG/ML
50 INJECTION, SOLUTION INTRAVENOUS CONTINUOUS
Status: DISCONTINUED | OUTPATIENT
Start: 2021-02-09 | End: 2021-02-09 | Stop reason: HOSPADM

## 2021-02-09 RX ORDER — MIDAZOLAM HYDROCHLORIDE 2 MG/2ML
INJECTION, SOLUTION INTRAMUSCULAR; INTRAVENOUS AS NEEDED
Status: DISCONTINUED | OUTPATIENT
Start: 2021-02-09 | End: 2021-02-09

## 2021-02-09 RX ORDER — LIDOCAINE HYDROCHLORIDE 20 MG/ML
INJECTION, SOLUTION EPIDURAL; INFILTRATION; INTRACAUDAL; PERINEURAL AS NEEDED
Status: DISCONTINUED | OUTPATIENT
Start: 2021-02-09 | End: 2021-02-09 | Stop reason: HOSPADM

## 2021-02-09 RX ORDER — KETOROLAC TROMETHAMINE 5 MG/ML
1 SOLUTION OPHTHALMIC
Status: DISPENSED | OUTPATIENT
Start: 2021-02-09 | End: 2021-02-09

## 2021-02-09 RX ORDER — TROPICAMIDE 10 MG/ML
1 SOLUTION/ DROPS OPHTHALMIC
Status: DISPENSED | OUTPATIENT
Start: 2021-02-09 | End: 2021-02-09

## 2021-02-09 RX ORDER — LIDOCAINE HYDROCHLORIDE 10 MG/ML
INJECTION, SOLUTION EPIDURAL; INFILTRATION; INTRACAUDAL; PERINEURAL AS NEEDED
Status: DISCONTINUED | OUTPATIENT
Start: 2021-02-09 | End: 2021-02-09 | Stop reason: HOSPADM

## 2021-02-09 RX ORDER — BROMFENAC SODIUM 0.7 MG/ML
SOLUTION/ DROPS OPHTHALMIC
COMMUNITY
Start: 2021-01-21 | End: 2021-09-10 | Stop reason: HOSPADM

## 2021-02-09 RX ORDER — CYCLOPENTOLATE HYDROCHLORIDE 10 MG/ML
1 SOLUTION/ DROPS OPHTHALMIC
Status: DISPENSED | OUTPATIENT
Start: 2021-02-09 | End: 2021-02-09

## 2021-02-09 RX ORDER — LABETALOL 20 MG/4 ML (5 MG/ML) INTRAVENOUS SYRINGE
AS NEEDED
Status: DISCONTINUED | OUTPATIENT
Start: 2021-02-09 | End: 2021-02-09

## 2021-02-09 RX ORDER — ACETAZOLAMIDE 250 MG/1
250 TABLET ORAL ONCE
Status: COMPLETED | OUTPATIENT
Start: 2021-02-09 | End: 2021-02-09

## 2021-02-09 RX ORDER — TETRACAINE HYDROCHLORIDE 5 MG/ML
SOLUTION OPHTHALMIC AS NEEDED
Status: DISCONTINUED | OUTPATIENT
Start: 2021-02-09 | End: 2021-02-09 | Stop reason: HOSPADM

## 2021-02-09 RX ORDER — ACETAMINOPHEN 325 MG/1
650 TABLET ORAL EVERY 4 HOURS PRN
Status: DISCONTINUED | OUTPATIENT
Start: 2021-02-09 | End: 2021-02-09 | Stop reason: HOSPADM

## 2021-02-09 RX ORDER — PROPOFOL 10 MG/ML
INJECTION, EMULSION INTRAVENOUS AS NEEDED
Status: DISCONTINUED | OUTPATIENT
Start: 2021-02-09 | End: 2021-02-09

## 2021-02-09 RX ORDER — PREDNISOLONE ACETATE 10 MG/ML
SUSPENSION/ DROPS OPHTHALMIC
Status: ON HOLD | COMMUNITY
Start: 2021-01-21 | End: 2021-02-09

## 2021-02-09 RX ORDER — FENTANYL CITRATE 50 UG/ML
INJECTION, SOLUTION INTRAMUSCULAR; INTRAVENOUS AS NEEDED
Status: DISCONTINUED | OUTPATIENT
Start: 2021-02-09 | End: 2021-02-09

## 2021-02-09 RX ORDER — GATIFLOXACIN 5 MG/ML
SOLUTION/ DROPS OPHTHALMIC
COMMUNITY
Start: 2021-01-21 | End: 2021-09-10 | Stop reason: HOSPADM

## 2021-02-09 RX ORDER — FENTANYL CITRATE/PF 50 MCG/ML
12.5 SYRINGE (ML) INJECTION
Status: DISCONTINUED | OUTPATIENT
Start: 2021-02-09 | End: 2021-02-09 | Stop reason: HOSPADM

## 2021-02-09 RX ADMIN — HYDRALAZINE HYDROCHLORIDE 6 MG: 20 INJECTION INTRAMUSCULAR; INTRAVENOUS at 14:20

## 2021-02-09 RX ADMIN — SODIUM CHLORIDE: 0.9 INJECTION, SOLUTION INTRAVENOUS at 13:40

## 2021-02-09 RX ADMIN — FENTANYL CITRATE 12.5 MCG: 50 INJECTION, SOLUTION INTRAMUSCULAR; INTRAVENOUS at 14:50

## 2021-02-09 RX ADMIN — HYDRALAZINE HYDROCHLORIDE 6 MG: 20 INJECTION INTRAMUSCULAR; INTRAVENOUS at 14:10

## 2021-02-09 RX ADMIN — LABETALOL 20 MG/4 ML (5 MG/ML) INTRAVENOUS SYRINGE 10 MG: at 13:48

## 2021-02-09 RX ADMIN — CYCLOPENTOLATE HYDROCHLORIDE 1 DROP: 10 SOLUTION/ DROPS OPHTHALMIC at 13:24

## 2021-02-09 RX ADMIN — TROPICAMIDE 1 DROP: 10 SOLUTION/ DROPS OPHTHALMIC at 13:25

## 2021-02-09 RX ADMIN — LABETALOL 20 MG/4 ML (5 MG/ML) INTRAVENOUS SYRINGE 10 MG: at 13:46

## 2021-02-09 RX ADMIN — KETOROLAC TROMETHAMINE 1 DROP: 5 SOLUTION OPHTHALMIC at 13:24

## 2021-02-09 RX ADMIN — PROPOFOL 30 MG: 10 INJECTION, EMULSION INTRAVENOUS at 13:55

## 2021-02-09 RX ADMIN — HYDRALAZINE HYDROCHLORIDE 4 MG: 20 INJECTION INTRAMUSCULAR; INTRAVENOUS at 14:12

## 2021-02-09 RX ADMIN — LABETALOL 20 MG/4 ML (5 MG/ML) INTRAVENOUS SYRINGE 10 MG: at 13:58

## 2021-02-09 RX ADMIN — LABETALOL 20 MG/4 ML (5 MG/ML) INTRAVENOUS SYRINGE 10 MG: at 13:55

## 2021-02-09 RX ADMIN — MIDAZOLAM HYDROCHLORIDE 2 MG: 1 INJECTION, SOLUTION INTRAMUSCULAR; INTRAVENOUS at 13:47

## 2021-02-09 RX ADMIN — ACETAZOLAMIDE 250 MG: 250 TABLET ORAL at 15:31

## 2021-02-09 RX ADMIN — PHENYLEPHRINE HYDROCHLORIDE 1 DROP: 25 SOLUTION/ DROPS OPHTHALMIC at 13:24

## 2021-02-09 RX ADMIN — FENTANYL CITRATE 12.5 MCG: 50 INJECTION, SOLUTION INTRAMUSCULAR; INTRAVENOUS at 14:39

## 2021-02-09 RX ADMIN — ACETAMINOPHEN 650 MG: 325 TABLET ORAL at 15:31

## 2021-02-09 RX ADMIN — FENTANYL CITRATE 100 MCG: 50 INJECTION INTRAMUSCULAR; INTRAVENOUS at 13:47

## 2021-02-09 NOTE — DISCHARGE INSTR - AVS FIRST PAGE
Patient to follow up with Elaina Stovall MD in the office tomorrow  Please bring your drops and your folder with your instruction sheet with you to the office  Please take your eye vitamins today and your acetazolamide, if prescribed, today with dinner and at bedtime, and again in the morning before your appointment  Please also read the post-op instruction sheet provided by our office

## 2021-02-09 NOTE — ANESTHESIA POSTPROCEDURE EVALUATION
Post-Op Assessment Note    CV Status:  Stable  Pain Score: 0    Pain management: adequate     Mental Status:  Alert   Hydration Status:  Stable   PONV Controlled:  Controlled   Airway Patency:  Patent      Post Op Vitals Reviewed: Yes      Staff: CRNA         No complications documented      BP     Temp     Pulse     Resp      SpO2

## 2021-02-09 NOTE — NURSING NOTE
Upon admission, pt's manual BP noted to be 218/120  Anesthesia notified and in to assess patient  Pt asymptomatic   To proceed as scheduled per anesthesiologist

## 2021-02-09 NOTE — OP NOTE
OPERATIVE REPORT  PATIENT NAME: Sandro Franco    :  1965  MRN: [id number]  Pt Location:  OR ROOM 01    SURGERY DATE: 2021    Surgeon(s) and Role:     * Sheryl Conti MD - Primary    Preop Diagnosis:  Combined forms of age-related cataract, left eye [H25 812]    Post-Op Diagnosis Codes:     * Combined forms of age-related cataract, left eye [H25 812]    Procedure(s) (LRB):  PHACO W/IOL & LRI (Left)    Specimen(s):  * No specimens in log *    Estimated Blood Loss:   Minimal    Drains:  * No LDAs found *    Anesthesia Type:   IV Sedation with Anesthesia    Operative Indications:  Combined forms of age-related cataract, left eye [H25 812]      Operative Findings:  Floppy iris    Complications:   None    Procedure and Technique:    Phaco Cataract Extraction left eye with placement of [id number] IOL 17 0 Dioptor serial # [id number]  Clear corneal incision with LRI 35 degrees around the 80 axis      The patient was brought to the operating room and placed supine on the operating room table after being identified by the surgeon  The patient was then given Alcaine  drops followed by 2% Lidocaine gel to the operative eye  A Nadbath block was given behind the ear on the operative site consisting of 0 5 mL of 2% plain Xylocaine  The lashes were then scrubbed with 5% Povidone iodine and then more of the 2% Lidocaine gel was placed in the eye  The face was then prepped and draped in the usual sterile fashion  A wire lid speculum was placed and the microscope was brought into position  A wick drain was placed in the inferior fornix  The eye was stabilized and the LRI was marked with a forceps  A 600 micron protected LRI knife was used to cut the lri in the premarked amount in the proper axis  This was checked with a weck cell and seen to be proper depth  Ocucoat was placed on the eye      Next, the eye was entered using the 2 8 blade (keratome) and Viscoelastic material was injected into the anterior chamber to deepen it  A paracentesis stab-wound was made with a #15 blade at the 2:30 position  Using the capsulorrhexis needle a large capsulorrhexis was performed in a curvilinear continuous fashion and then the anterior capsule piece was removed from the eye using forceps  Hydrodissection and hydrodelineation were then carried out using BSS from the bottle until good fluid waves were seen and the nucleus was loosened  Next the phacoemulsification handpiece was brought into the eye and grooving begun  The lens was then rotated and the nucleus was grooved further and cracked in half  Each half was then sculpted and cracked into quadrants  The quadrants were then removed using a pulse technique for emulsification and then the epinuclear shell was suctioned out using the bimodal setting  The I/A instrument was then inserted into the eye and was used to remove all remaining cortex  The anterior rim and posterior capsule were then polished using both the I/A on Cap Vac and using a capsule polisher  The bag was then inflated with Provisc  The intraocular lens wasinjected into the capsular bag and rotated into place  The lens was seen to be well centered  The I/A instrument was then used to remove all remaining Healon and Miochol was used to bring down the pupil  The lid speculum was removed and the eye was dressed with Maxitrol ointment in the eye  An eye pad was placed and taped into position and a shield was placed and taped into position  The patient tolerated the procedure extremely well and was taken to the recovery room in excellent condition       I was present for the entire procedure    Patient Disposition:  PACU     SIGNATURE: Abdoul Trujillo MD  DATE: February 9, 2021  TIME: 2:22 PM

## 2021-02-09 NOTE — INTERVAL H&P NOTE
H&P reviewed  After examining the patient I find no changes in the patients condition since the H&P had been written  Left eye today    There were no vitals filed for this visit

## 2021-02-10 NOTE — ANESTHESIA POSTPROCEDURE EVALUATION
Post-Op Assessment Note    CV Status:  Stable    Pain management: adequate     Mental Status:  Awake   PONV Controlled:  None   Airway Patency:  Patent       Staff: Anesthesiologist   Comments: Pt  has no chest pain, HA, Nausea in the PACU  BP stable  No complications documented

## 2021-02-22 PROBLEM — I10 ESSENTIAL HYPERTENSION: Status: ACTIVE | Noted: 2021-02-22

## 2021-02-22 PROBLEM — H53.9 VISUAL DISORDER: Status: ACTIVE | Noted: 2021-02-22

## 2021-02-22 NOTE — ANESTHESIA PREPROCEDURE EVALUATION
Procedure:  PHACO W/IOL & LRI (Right Eye)    Relevant Problems   ANESTHESIA  old charts reviewed      CARDIO  on no anti hypertensives  tx effectively with sedation   (+) Essential hypertension   (+) Hyperlipidemia      ENDO  morbid obesity      /RENAL (within normal limits)      GYN   (-) Currently pregnant      HEMATOLOGY (within normal limits)      MUSCULOSKELETAL (within normal limits)      NEURO/PSYCH  glaucoma hx   (+) Anxiety   (+) Depression   (+) Headache   (+) Visual disorder      PULMONARY   (-) Sleep apnea   (-) Smoking   (-) URI (upper respiratory infection)        Physical Exam    Airway    Mallampati score: II  TM Distance: >3 FB  Neck ROM: full     Dental       Cardiovascular  Cardiovascular exam normal    Pulmonary  Pulmonary exam normal     Other Findings  Fixed upper and lower teeth and in good repair      Anesthesia Plan  ASA Score- 3     Anesthesia Type- IV sedation with anesthesia with ASA Monitors  Additional Monitors:   Airway Plan:     Comment: Possible TERI  Plan Factors-Exercise tolerance (METS): >4 METS  Chart reviewed  EKG reviewed  Existing labs reviewed  Patient summary reviewed  Patient is not a current smoker  Patient not instructed to abstain from smoking on day of procedure  Patient did not smoke on day of surgery  Obstructive sleep apnea risk education given perioperatively  Induction- intravenous  Postoperative Plan- Plan for postoperative opioid use  Informed Consent- Anesthetic plan and risks discussed with patient  I personally reviewed this patient with the CRNA  Discussed and agreed on the Anesthesia Plan with the CRNA  Emir Beckford

## 2021-02-23 ENCOUNTER — HOSPITAL ENCOUNTER (OUTPATIENT)
Facility: HOSPITAL | Age: 56
Setting detail: OUTPATIENT SURGERY
Discharge: HOME | End: 2021-02-23
Attending: OPHTHALMOLOGY | Admitting: OPHTHALMOLOGY
Payer: COMMERCIAL

## 2021-02-23 ENCOUNTER — ANESTHESIA (OUTPATIENT)
Dept: PERIOP | Facility: HOSPITAL | Age: 56
End: 2021-02-23
Payer: COMMERCIAL

## 2021-02-23 VITALS
BODY MASS INDEX: 39.99 KG/M2 | WEIGHT: 240 LBS | DIASTOLIC BLOOD PRESSURE: 80 MMHG | HEART RATE: 73 BPM | OXYGEN SATURATION: 95 % | TEMPERATURE: 96.7 F | SYSTOLIC BLOOD PRESSURE: 199 MMHG | HEIGHT: 65 IN | RESPIRATION RATE: 16 BRPM

## 2021-02-23 VITALS — HEART RATE: 78 BPM

## 2021-02-23 PROBLEM — H25.811 COMBINED FORMS OF AGE-RELATED CATARACT, RIGHT EYE: Status: ACTIVE | Noted: 2021-02-23

## 2021-02-23 LAB
EXT PREGNANCY TEST URINE: NEGATIVE
EXT. CONTROL: NORMAL

## 2021-02-23 PROCEDURE — 81025 URINE PREGNANCY TEST: CPT | Performed by: OPHTHALMOLOGY

## 2021-02-23 PROCEDURE — V2788 PRESBYOPIA-CORRECT FUNCTION: HCPCS | Performed by: OPHTHALMOLOGY

## 2021-02-23 DEVICE — IMPLANTABLE DEVICE: Type: IMPLANTABLE DEVICE | Site: POSTERIOR CHAMBER | Status: FUNCTIONAL

## 2021-02-23 RX ORDER — CYCLOPENTOLATE HYDROCHLORIDE 10 MG/ML
1 SOLUTION/ DROPS OPHTHALMIC
Status: COMPLETED | OUTPATIENT
Start: 2021-02-23 | End: 2021-02-23

## 2021-02-23 RX ORDER — TROPICAMIDE 10 MG/ML
1 SOLUTION/ DROPS OPHTHALMIC
Status: COMPLETED | OUTPATIENT
Start: 2021-02-23 | End: 2021-02-23

## 2021-02-23 RX ORDER — PHENYLEPHRINE HCL 2.5 %
1 DROPS OPHTHALMIC (EYE)
Status: COMPLETED | OUTPATIENT
Start: 2021-02-23 | End: 2021-02-23

## 2021-02-23 RX ORDER — LIDOCAINE HYDROCHLORIDE 20 MG/ML
JELLY TOPICAL AS NEEDED
Status: DISCONTINUED | OUTPATIENT
Start: 2021-02-23 | End: 2021-02-23 | Stop reason: HOSPADM

## 2021-02-23 RX ORDER — NEOMYCIN SULFATE, POLYMYXIN B SULFATE, AND DEXAMETHASONE 3.5; 10000; 1 MG/G; [USP'U]/G; MG/G
OINTMENT OPHTHALMIC AS NEEDED
Status: DISCONTINUED | OUTPATIENT
Start: 2021-02-23 | End: 2021-02-23 | Stop reason: HOSPADM

## 2021-02-23 RX ORDER — ACETAMINOPHEN 325 MG/1
650 TABLET ORAL EVERY 4 HOURS PRN
Status: DISCONTINUED | OUTPATIENT
Start: 2021-02-23 | End: 2021-02-23 | Stop reason: HOSPADM

## 2021-02-23 RX ORDER — ONDANSETRON 2 MG/ML
INJECTION INTRAMUSCULAR; INTRAVENOUS AS NEEDED
Status: DISCONTINUED | OUTPATIENT
Start: 2021-02-23 | End: 2021-02-23

## 2021-02-23 RX ORDER — SCOLOPAMINE TRANSDERMAL SYSTEM 1 MG/1
1 PATCH, EXTENDED RELEASE TRANSDERMAL ONCE
Status: DISCONTINUED | OUTPATIENT
Start: 2021-02-23 | End: 2021-02-23 | Stop reason: HOSPADM

## 2021-02-23 RX ORDER — ACETAZOLAMIDE 250 MG/1
250 TABLET ORAL ONCE
Status: COMPLETED | OUTPATIENT
Start: 2021-02-23 | End: 2021-02-23

## 2021-02-23 RX ORDER — LIDOCAINE HYDROCHLORIDE 10 MG/ML
INJECTION, SOLUTION EPIDURAL; INFILTRATION; INTRACAUDAL; PERINEURAL AS NEEDED
Status: DISCONTINUED | OUTPATIENT
Start: 2021-02-23 | End: 2021-02-23 | Stop reason: HOSPADM

## 2021-02-23 RX ORDER — LIDOCAINE HYDROCHLORIDE 10 MG/ML
INJECTION, SOLUTION EPIDURAL; INFILTRATION; INTRACAUDAL; PERINEURAL AS NEEDED
Status: DISCONTINUED | OUTPATIENT
Start: 2021-02-23 | End: 2021-02-23

## 2021-02-23 RX ORDER — TETRACAINE HYDROCHLORIDE 5 MG/ML
SOLUTION OPHTHALMIC AS NEEDED
Status: DISCONTINUED | OUTPATIENT
Start: 2021-02-23 | End: 2021-02-23 | Stop reason: HOSPADM

## 2021-02-23 RX ORDER — BALANCED SALT SOLUTION 6.4; .75; .48; .3; 3.9; 1.7 MG/ML; MG/ML; MG/ML; MG/ML; MG/ML; MG/ML
SOLUTION OPHTHALMIC AS NEEDED
Status: DISCONTINUED | OUTPATIENT
Start: 2021-02-23 | End: 2021-02-23 | Stop reason: HOSPADM

## 2021-02-23 RX ORDER — PROPOFOL 10 MG/ML
INJECTION, EMULSION INTRAVENOUS AS NEEDED
Status: DISCONTINUED | OUTPATIENT
Start: 2021-02-23 | End: 2021-02-23

## 2021-02-23 RX ORDER — AMLODIPINE BESYLATE 5 MG/1
10 TABLET ORAL DAILY
COMMUNITY

## 2021-02-23 RX ORDER — KETOROLAC TROMETHAMINE 5 MG/ML
1 SOLUTION OPHTHALMIC
Status: DISPENSED | OUTPATIENT
Start: 2021-02-23 | End: 2021-02-23

## 2021-02-23 RX ORDER — SODIUM CHLORIDE 9 MG/ML
125 INJECTION, SOLUTION INTRAVENOUS CONTINUOUS
Status: DISCONTINUED | OUTPATIENT
Start: 2021-02-23 | End: 2021-02-23 | Stop reason: HOSPADM

## 2021-02-23 RX ORDER — LIDOCAINE HYDROCHLORIDE 20 MG/ML
INJECTION, SOLUTION EPIDURAL; INFILTRATION; INTRACAUDAL; PERINEURAL AS NEEDED
Status: DISCONTINUED | OUTPATIENT
Start: 2021-02-23 | End: 2021-02-23 | Stop reason: HOSPADM

## 2021-02-23 RX ORDER — MIDAZOLAM HYDROCHLORIDE 2 MG/2ML
INJECTION, SOLUTION INTRAMUSCULAR; INTRAVENOUS AS NEEDED
Status: DISCONTINUED | OUTPATIENT
Start: 2021-02-23 | End: 2021-02-23

## 2021-02-23 RX ADMIN — PHENYLEPHRINE HYDROCHLORIDE 1 DROP: 25 SOLUTION/ DROPS OPHTHALMIC at 06:40

## 2021-02-23 RX ADMIN — KETOROLAC TROMETHAMINE 1 DROP: 5 SOLUTION OPHTHALMIC at 07:15

## 2021-02-23 RX ADMIN — LIDOCAINE HYDROCHLORIDE 20 MG: 10 INJECTION, SOLUTION EPIDURAL; INFILTRATION; INTRACAUDAL; PERINEURAL at 08:22

## 2021-02-23 RX ADMIN — MIDAZOLAM HYDROCHLORIDE 1 MG: 1 INJECTION, SOLUTION INTRAMUSCULAR; INTRAVENOUS at 08:22

## 2021-02-23 RX ADMIN — ACETAMINOPHEN 650 MG: 325 TABLET ORAL at 09:37

## 2021-02-23 RX ADMIN — ACETAZOLAMIDE 250 MG: 250 TABLET ORAL at 09:37

## 2021-02-23 RX ADMIN — KETOROLAC TROMETHAMINE 1 DROP: 5 SOLUTION OPHTHALMIC at 06:41

## 2021-02-23 RX ADMIN — CYCLOPENTOLATE HYDROCHLORIDE 1 DROP: 10 SOLUTION OPHTHALMIC at 07:16

## 2021-02-23 RX ADMIN — TROPICAMIDE 1 DROP: 10 SOLUTION/ DROPS OPHTHALMIC at 07:16

## 2021-02-23 RX ADMIN — SODIUM CHLORIDE: 0.9 INJECTION, SOLUTION INTRAVENOUS at 07:13

## 2021-02-23 RX ADMIN — TROPICAMIDE 1 DROP: 10 SOLUTION/ DROPS OPHTHALMIC at 07:33

## 2021-02-23 RX ADMIN — PHENYLEPHRINE HYDROCHLORIDE 1 DROP: 25 SOLUTION/ DROPS OPHTHALMIC at 07:00

## 2021-02-23 RX ADMIN — MIDAZOLAM HYDROCHLORIDE 1 MG: 1 INJECTION, SOLUTION INTRAMUSCULAR; INTRAVENOUS at 08:08

## 2021-02-23 RX ADMIN — PROPOFOL 20 MG: 10 INJECTION, EMULSION INTRAVENOUS at 08:30

## 2021-02-23 RX ADMIN — TROPICAMIDE 1 DROP: 10 SOLUTION/ DROPS OPHTHALMIC at 06:41

## 2021-02-23 RX ADMIN — MIDAZOLAM HYDROCHLORIDE 2 MG: 1 INJECTION, SOLUTION INTRAMUSCULAR; INTRAVENOUS at 08:04

## 2021-02-23 RX ADMIN — ONDANSETRON HYDROCHLORIDE 4 MG: 2 INJECTION, SOLUTION INTRAMUSCULAR; INTRAVENOUS at 08:04

## 2021-02-23 RX ADMIN — PHENYLEPHRINE HYDROCHLORIDE 1 DROP: 25 SOLUTION/ DROPS OPHTHALMIC at 07:16

## 2021-02-23 RX ADMIN — CYCLOPENTOLATE HYDROCHLORIDE 1 DROP: 10 SOLUTION OPHTHALMIC at 06:41

## 2021-02-23 RX ADMIN — PHENYLEPHRINE HYDROCHLORIDE 1 DROP: 25 SOLUTION/ DROPS OPHTHALMIC at 07:33

## 2021-02-23 RX ADMIN — CYCLOPENTOLATE HYDROCHLORIDE 1 DROP: 10 SOLUTION OPHTHALMIC at 07:33

## 2021-02-23 RX ADMIN — CYCLOPENTOLATE HYDROCHLORIDE 1 DROP: 10 SOLUTION OPHTHALMIC at 07:00

## 2021-02-23 RX ADMIN — PROPOFOL 20 MG: 10 INJECTION, EMULSION INTRAVENOUS at 08:22

## 2021-02-23 RX ADMIN — SCOPALAMINE 1 PATCH: 1 PATCH, EXTENDED RELEASE TRANSDERMAL at 06:39

## 2021-02-23 RX ADMIN — TROPICAMIDE 1 DROP: 10 SOLUTION/ DROPS OPHTHALMIC at 07:01

## 2021-02-23 NOTE — OP NOTE
OPERATIVE REPORT  PATIENT NAME: Rohit Torres    :  1965  MRN: [id number]  Pt Location:  OR ROOM 01    SURGERY DATE: 2021    Surgeon(s) and Role:     * Ricardo Krishna MD - Primary    Preop Diagnosis:  Combined forms of age-related cataract, right eye [H25 811]    Post-Op Diagnosis Codes:     * Combined forms of age-related cataract, right eye [H25 811]    Procedure(s) (LRB):  PHACO W/IOL & LRI (Right)    Specimen(s):  * No specimens in log *    Estimated Blood Loss:   Minimal    Drains:  * No LDAs found *    Anesthesia Type:   IV Sedation with Anesthesia    Operative Indications:  Combined forms of age-related cataract, right eye [H25 811]      Operative Findings:      Complications:   None    Procedure and Technique:    Phaco Cataract Extraction right eye with placement of EBQ500 IOL 18 5 Dioptor serial # [id number]  Clear corneal incision with LRI 35degrees around the 90 axis      The patient was brought to the operating room and placed supine on the operating room table after being identified by the surgeon  The patient was then given Alcaine  drops followed by 2% Lidocaine gel to the operative eye  A Nadbath block was given behind the ear on the operative site consisting of 0 5 mL of 2% plain Xylocaine  The lashes were then scrubbed with 5% Povidone iodine and then more of the 2% Lidocaine gel was placed in the eye  The face was then prepped and draped in the usual sterile fashion  A wire lid speculum was placed and the microscope was brought into position  A wick drain was placed in the inferior fornix  The eye was stabilized and the LRI was marked with a forceps  A 600 micron protected LRI knife was used to cut the lri in the premarked amount in the proper axis  This was checked with a weck cell and seen to be proper depth  Ocucoat was placed on the eye      Next, the eye was entered using the 2 8 blade (keratome) and Viscoelastic material was injected into the anterior chamber to deepen it  A paracentesis stab-wound was made with a #15 blade at the 2:30 position  Using the capsulorrhexis needle a large capsulorrhexis was performed in a curvilinear continuous fashion and then the anterior capsule piece was removed from the eye using forceps  Hydrodissection and hydrodelineation were then carried out using BSS from the bottle until good fluid waves were seen and the nucleus was loosened  Next the phacoemulsification handpiece was brought into the eye and grooving begun  The lens was then rotated and the nucleus was grooved further and cracked in half  Each half was then sculpted and cracked into quadrants  The quadrants were then removed using a pulse technique for emulsification and then the epinuclear shell was suctioned out using the bimodal setting  The I/A instrument was then inserted into the eye and was used to remove all remaining cortex  The anterior rim and posterior capsule were then polished using both the I/A on Cap Vac and using a capsule polisher  The bag was then inflated with Provisc  The intraocular lens wasinjected into the capsular bag and rotated into place  The lens was seen to be well centered  The I/A instrument was then used to remove all remaining Healon and Miochol was used to bring down the pupil  The lid speculum was removed and the eye was dressed with Maxitrol ointment in the eye  An eye pad was placed and taped into position and a shield was placed and taped into position  The patient tolerated the procedure extremely well and was taken to the recovery room in excellent condition         I was present for the entire procedure    Patient Disposition:  PACU     SIGNATURE: Erick Wade MD  DATE: February 23, 2021  TIME: 8:42 AM

## 2021-02-23 NOTE — ANESTHESIA POSTPROCEDURE EVALUATION
Post-Op Assessment Note    CV Status:  Stable  Pain Score: 1    Pain management: adequate     Mental Status:  Alert and awake   Hydration Status:  Euvolemic   PONV Controlled:  Controlled   Airway Patency:  Patent      Post Op Vitals Reviewed: Yes      Staff: Anesthesiologist, CRNA         No complications documented      BP (!) 178/84 (02/23/21 0850)    Temp (!) 96 9 °F (36 1 °C) (02/23/21 0841)    Pulse 76 (02/23/21 0850)   Resp 14 (02/23/21 0850)    SpO2 96 % (02/23/21 0850)

## 2021-02-23 NOTE — ANESTHESIA POSTPROCEDURE EVALUATION
Post-Op Assessment Note    CV Status:  Stable  Pain Score: 0    Pain management: adequate     Mental Status:  Alert and awake   Hydration Status:  Euvolemic   PONV Controlled:  Controlled   Airway Patency:  Patent      Post Op Vitals Reviewed: Yes      Staff: CRNA         No complications documented      BP  191/91   Temp      Pulse  83   Resp   16   SpO2   97

## 2021-02-23 NOTE — DISCHARGE INSTR - AVS FIRST PAGE
Patient to follow up with Meg Stubbs MD in the office tomorrow  Please bring your drops and your folder with your instruction sheet with you to the office  Please take your eye vitamins today and your acetazolamide, if prescribed, today with dinner and at bedtime, and again in the morning before your appointment  Please also read the post-op instruction sheet provided by our office

## 2021-05-07 ENCOUNTER — OFFICE VISIT (OUTPATIENT)
Dept: OBGYN CLINIC | Facility: CLINIC | Age: 56
End: 2021-05-07
Payer: COMMERCIAL

## 2021-05-07 VITALS
HEIGHT: 65 IN | BODY MASS INDEX: 40.89 KG/M2 | SYSTOLIC BLOOD PRESSURE: 142 MMHG | WEIGHT: 245.4 LBS | DIASTOLIC BLOOD PRESSURE: 98 MMHG

## 2021-05-07 DIAGNOSIS — N87.0 MILD DYSPLASIA OF CERVIX (CIN I): ICD-10-CM

## 2021-05-07 DIAGNOSIS — Z11.51 SCREENING FOR HPV (HUMAN PAPILLOMAVIRUS): ICD-10-CM

## 2021-05-07 DIAGNOSIS — Z23 NEED FOR PROPHYLACTIC VACCINATION AGAINST HUMAN PAPILLOMAVIRUS (HPV) TYPES 6, 11, 16, AND 18: ICD-10-CM

## 2021-05-07 DIAGNOSIS — B97.7 HPV (HUMAN PAPILLOMA VIRUS) INFECTION: Primary | ICD-10-CM

## 2021-05-07 PROCEDURE — 88305 TISSUE EXAM BY PATHOLOGIST: CPT | Performed by: PATHOLOGY

## 2021-05-07 PROCEDURE — 99213 OFFICE O/P EST LOW 20 MIN: CPT | Performed by: OBSTETRICS & GYNECOLOGY

## 2021-05-07 PROCEDURE — 57505 ENDOCERVICAL CURETTAGE: CPT | Performed by: OBSTETRICS & GYNECOLOGY

## 2021-05-07 PROCEDURE — G0145 SCR C/V CYTO,THINLAYER,RESCR: HCPCS | Performed by: OBSTETRICS & GYNECOLOGY

## 2021-05-07 PROCEDURE — 87624 HPV HI-RISK TYP POOLED RSLT: CPT | Performed by: OBSTETRICS & GYNECOLOGY

## 2021-05-07 NOTE — PROGRESS NOTES
Assessment/Plan:     Addendum - her inner labia and perineum were very excoriation, consistent with vulvar candidiasis  Will treat with lotrisone and call if no relief      mild dysplasia, HPV 16-I reviewed this with her in detail and gave her information to review  Will await the results of the ECC and the Pap  Her questions were answered  She will return for her yearly in October  Diagnoses and all orders for this visit:    HPV (human papilloma virus) infection  -     Tissue Exam  -     Liquid-based pap, screening    Screening for HPV (human papillomavirus)  -     Tissue Exam  -     Liquid-based pap, screening          Subjective:     Patient ID: Chapito Chavez is a 50 y o  female  Patient here for repeat Pap and ECC  When she was here for her yearly in October, she had ASCUS Pap with positive HPV 16  She had the same thing in 2016 and had a colposcopy that was normal  Her colposcopy in December 2020 showed JENNIFER 1 but the ECC did not show endocervical cells  Due to the persistent positive HPV 16, I recommended repeating the ECC and a Pap  She has no complaints  She is gautam menopausal   She is not sexually active  Review of Systems   Constitutional: Negative  Gastrointestinal: Negative  Genitourinary: Negative  Objective:     Physical Exam  Genitourinary:     General: Normal vulva        Vagina: Normal       Cervix: Normal       Uterus: Normal        Adnexa: Right adnexa normal and left adnexa normal       Comments:  ECC done without difficulty, Pap done

## 2021-05-10 ENCOUNTER — TELEPHONE (OUTPATIENT)
Dept: OBGYN CLINIC | Facility: CLINIC | Age: 56
End: 2021-05-10

## 2021-05-10 DIAGNOSIS — B37.3 VULVAR CANDIDIASIS: Primary | ICD-10-CM

## 2021-05-10 RX ORDER — CLOTRIMAZOLE AND BETAMETHASONE DIPROPIONATE 10; .64 MG/G; MG/G
CREAM TOPICAL 2 TIMES DAILY
Qty: 30 G | Refills: 0 | Status: SHIPPED | OUTPATIENT
Start: 2021-05-10 | End: 2021-09-20

## 2021-05-10 NOTE — TELEPHONE ENCOUNTER
Patient called  Had appointment on 5/7/21  She thought Dr Tilman Hamman was going to sent an Rx to her pharmacy [facility] in [place] on [address] , but no Rx is there  She doesn't know the name of the Rx or what it was for

## 2021-05-13 LAB
LAB AP GYN PRIMARY INTERPRETATION: NORMAL
Lab: NORMAL
PATH INTERP SPEC-IMP: NORMAL

## 2021-05-14 ENCOUNTER — TELEPHONE (OUTPATIENT)
Dept: OBGYN CLINIC | Facility: CLINIC | Age: 56
End: 2021-05-14

## 2021-07-19 ENCOUNTER — TELEPHONE (OUTPATIENT)
Dept: OBGYN CLINIC | Facility: CLINIC | Age: 56
End: 2021-07-19

## 2021-07-19 NOTE — TELEPHONE ENCOUNTER
How much lotrisone did she use? If she used a full course of it more than once and she is still having itching, she should come in for eval and possible vulvar bx  She was very excoriated at her visit

## 2021-07-30 ENCOUNTER — OFFICE VISIT (OUTPATIENT)
Dept: OBGYN CLINIC | Facility: CLINIC | Age: 56
End: 2021-07-30
Payer: COMMERCIAL

## 2021-07-30 VITALS
DIASTOLIC BLOOD PRESSURE: 82 MMHG | BODY MASS INDEX: 40.98 KG/M2 | WEIGHT: 246 LBS | HEIGHT: 65 IN | SYSTOLIC BLOOD PRESSURE: 122 MMHG

## 2021-07-30 DIAGNOSIS — N76.2 ACUTE VULVITIS: Primary | ICD-10-CM

## 2021-07-30 PROCEDURE — 88344 IMHCHEM/IMCYTCHM EA MLT ANTB: CPT | Performed by: PATHOLOGY

## 2021-07-30 PROCEDURE — 88312 SPECIAL STAINS GROUP 1: CPT | Performed by: PATHOLOGY

## 2021-07-30 PROCEDURE — 88341 IMHCHEM/IMCYTCHM EA ADD ANTB: CPT | Performed by: PATHOLOGY

## 2021-07-30 PROCEDURE — 88342 IMHCHEM/IMCYTCHM 1ST ANTB: CPT | Performed by: PATHOLOGY

## 2021-07-30 PROCEDURE — 88313 SPECIAL STAINS GROUP 2: CPT | Performed by: PATHOLOGY

## 2021-07-30 PROCEDURE — 56605 BIOPSY OF VULVA/PERINEUM: CPT | Performed by: OBSTETRICS & GYNECOLOGY

## 2021-07-30 PROCEDURE — 88305 TISSUE EXAM BY PATHOLOGIST: CPT | Performed by: PATHOLOGY

## 2021-07-30 NOTE — PROGRESS NOTES
Biopsy    Date/Time: 7/30/2021 3:48 PM  Performed by: Feliz Aleman DO  Authorized by: Feliz Aleman DO   Universal Protocol:  Consent: Verbal consent obtained  Written consent obtained  Consent given by: patient  Patient understanding: patient states understanding of the procedure being performed  Patient identity confirmed: verbally with patient      Procedure Details - Skin Biopsy:     Biopsy tissue type: skin    Biopsy method: punch biopsy      Body area: Anogenital    Anogenital location:  Vulva    Vaginal Lesion: No      Initial size (mm):  4    Final defect size (mm):  4    Malignancy: malignancy unknown         Patient presents for vulvar biopsy  When she was here in May for a colposcopy, I noticed extensive excoriation of her inner labia  She was having some itching and I prescribed Lotrisone  She used a course of Lotrisone and the itching did not resolve  She still has the excoriation and I recommended a vulvar biopsy  This was done today without difficulty  4 mm punch biopsy taken of the inner left labia at the 5 o'clock position  There was good hemostasis with pressure, silver nitrate and Melania's  Will await the results

## 2021-08-10 ENCOUNTER — TELEPHONE (OUTPATIENT)
Dept: HEMATOLOGY ONCOLOGY | Facility: CLINIC | Age: 56
End: 2021-08-10

## 2021-08-10 DIAGNOSIS — N90.3 VULVAR DYSPLASIA: Primary | ICD-10-CM

## 2021-08-10 NOTE — TELEPHONE ENCOUNTER
New Patient Request   Patient Details:     Rosi Pepper      1965      [id number]      Reason for Appointment   Who is calling to schedule? Patient   If not Patient, what is their name? [facility]   What is the diagnosis? 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 Petra Irby MD   Scheduling Information   Which department are you scheduling with ? [facility] None    Best Number to call back on? If calling from the [facility], use the Nurse number   [phone]   Miscellaneous Information:     Please advise the patient, a new patient  will be calling them back within 1 business day

## 2021-08-10 NOTE — TELEPHONE ENCOUNTER
Dr [place] office calling to advise they a referral will be placed for patient  They will call back once the referral is entered

## 2021-08-10 NOTE — PROGRESS NOTES
Patient has seen Dr Roshan Rob  Was recommended to be seen by gyn Oncology  Referral was not previously placed  Ambulatory referral gyn Oncology was placed today as requested by the patient and as per Dr Roshan Rob recommendations

## 2021-08-11 ENCOUNTER — TELEPHONE (OUTPATIENT)
Dept: SURGICAL ONCOLOGY | Facility: CLINIC | Age: 56
End: 2021-08-11

## 2021-08-11 NOTE — TELEPHONE ENCOUNTER
New Patient Encounter    New Patient Intake Form   Patient Details:  Tre Meza  1965  [id number]    Background Information:  [address] starts by opening a telephone encounter and gathering the following information   Who is calling to schedule? If not self, relationship to patient? Patient   Referring Provider Dr Tasha Daniel   What is the diagnosis? Vulvar dysplasia  hgsil   Is this Cancer or Non-Cancer? Non-Cancer   Is this diagnosis confirmed? Yes   When was the diagnosis? 8/2021   Is there a confirmed diagnosis from a biopsy/tissue reviewed by pathology? Yes   Were outside slides requested? NA   Is patient aware of diagnosis? Yes   Is there a personal history and what kind? No   Is there a family history and what kind? No   Reason for visit? New Diagnosis   Have you had any imaging or labs done? If so: when, where? no     Are records in [facility]? yes   If patient has a prior history of cancer were old records obtained? NA   Was the patient told to bring a disk? No   Does the patient smoke or Vape? No   If yes, how many packs or cartridges per day? Scheduling Information:   Preferred [place]:  [place]     Are there any dates/time the patient cannot be seen? Miscellaneous:    After completing the above information, please route to Financial Counselor and the appropriate Nurse Navigator for review

## 2021-08-23 ENCOUNTER — TELEPHONE (OUTPATIENT)
Dept: CARDIAC SURGERY | Facility: CLINIC | Age: 56
End: 2021-08-23

## 2021-08-23 ENCOUNTER — CONSULT (OUTPATIENT)
Dept: GYNECOLOGIC ONCOLOGY | Facility: CLINIC | Age: 56
End: 2021-08-23
Payer: COMMERCIAL

## 2021-08-23 VITALS
WEIGHT: 246 LBS | DIASTOLIC BLOOD PRESSURE: 80 MMHG | SYSTOLIC BLOOD PRESSURE: 122 MMHG | BODY MASS INDEX: 42 KG/M2 | TEMPERATURE: 97.2 F | RESPIRATION RATE: 16 BRPM | HEIGHT: 64 IN | HEART RATE: 91 BPM | OXYGEN SATURATION: 96 %

## 2021-08-23 DIAGNOSIS — D07.1 VIN III (VULVAR INTRAEPITHELIAL NEOPLASIA III): Primary | ICD-10-CM

## 2021-08-23 PROCEDURE — 99245 OFF/OP CONSLTJ NEW/EST HI 55: CPT | Performed by: OBSTETRICS & GYNECOLOGY

## 2021-08-23 RX ORDER — CEFAZOLIN SODIUM 2 G/50ML
2000 SOLUTION INTRAVENOUS ONCE
Status: CANCELLED | OUTPATIENT
Start: 2021-09-10 | End: 2021-08-23

## 2021-08-23 RX ORDER — RIBOFLAVIN (VITAMIN B2) 100 MG
120 TABLET ORAL DAILY
Status: ON HOLD | COMMUNITY
End: 2022-02-01

## 2021-08-23 RX ORDER — ZINC GLUCONATE 50 MG
50 TABLET ORAL DAILY
COMMUNITY
End: 2021-09-10 | Stop reason: HOSPADM

## 2021-08-23 RX ORDER — MELATONIN
4000 DAILY
COMMUNITY

## 2021-08-23 NOTE — PROGRESS NOTES
Assessment/Plan:    Problem List Items Addressed This Visit        Genitourinary    AME III (vulvar intraepithelial neoplasia III) - Primary      Patient is very pleasant 61-year-old female  0 with a recent diagnosis of biopsy-proven AME 3  Her exam is consistent with this with a lesion in the posterior fourchette  We discussed with the patient treatment options of Aldara versus resection verses laser and have recommended simple partial vulvectomy  We discussed with the patient risks and benefits of the procedure including bleeding requiring transfusion infection damage to local structures pain with intercourse and vulvar pain  The patient understands and accepts the risks of surgery however has left the office prior to side meaning the surgical consent  We will move ahead with signing this the morning of surgery  I have recommended preop  EKG CBC CPM P due to the patient's history of hypertension and hypercholesterolemia  We discussed with the patient that she could plan to take off between 1 and 4 weeks of work  She could go in earlier if she could make arrangements to not sit for prolonged periods of times as this will likely be uncomfortable  The patient is also aware that intercourse post procedure will be uncomfortable  All questions were answered and the patient will follow-up for surgery         Relevant Orders    Case request operating room: VULVECTOMY SIMPLE   Partial (Completed)    Type and screen    CBC and differential    EKG 12 lead    Comprehensive metabolic panel              CHIEF COMPLAINT:  AME 3        Previous therapy:  Oncology History    No history exists  Patient ID: Ryan Wilcox is a 50 y o  female   Patient is very pleasant 61-year-old female seen in consultation from Dr Marilu Celaya regarding evaluation and management of AME 3        patient notes 1-2 year history of intermittent vulvar pruritus  The patient was prescribed Lotrisone and had  someimprovement  However after discontinuation of the Lotrisone the itching generally came back  She was followed up by Dr Nikunj Painter for evaluation  The patient underwent 4 mm punch biopsy of the left labia which revealed the following:   Final Diagnosis  A  Vulva, Left Labia, punch biopsy:   - High-grade squamous intraepithelial lesion/vulvar intraepithelial neoplasia grade 2-3 of usual     type (HSIL/uVIN 2-3), present at peripheral borders of biopsy  -- Confirmed by positive p16/Ki-67; focally increased p53 expression and negative CK7, pCEA,        GCDFP-15, SOX10 immunostains    - Special stains for fungus (PAS) and mucin (mucicarmine) negative  - No invasive carcinoma identified  The patient has also had a recent Pap smear which was unremarkable however HPV 16 was detected  ECC was negative  Patient presents for further evaluation  She notes some vulvar  Itching and burning ongoing for several months  She has no bleeding  She has no pain  Today, the patient is doing well  She denies significant abdominal pain, pelvic pain, nausea, vomiting, constipation, diarrhea, fevers, chills, or vaginal bleeding  She presents now for evaluation and management of AME 3  Review of Systems   Constitutional: Negative  HENT: Negative  Eyes: Negative  Respiratory: Negative  Cardiovascular: Negative  Gastrointestinal: Negative  Endocrine: Negative  Genitourinary: Negative  Vulvar itching and burning intermittent   Musculoskeletal: Negative  Skin: Negative  Neurological: Negative  Hematological: Negative  Psychiatric/Behavioral: Negative          Current Outpatient Medications   Medication Sig Dispense Refill    amLODIPine (NORVASC) 5 mg tablet Take 10 mg by mouth daily      Ascorbic Acid (vitamin C) 100 MG tablet Take 120 mg by mouth daily      cholecalciferol (VITAMIN D3) 1,000 units tablet Take 1,000 Units by mouth daily 2,000      Flaxseed, Linseed, (Flaxseed Oil) 1000 MG CAPS Take by mouth      lovastatin (MEVACOR) 40 MG tablet Take 40 mg by mouth daily       Melatonin 10 MG TABS Take by mouth      NON FORMULARY 2 capsules as needed Herbal laxative      Nutritional Supplements (GRAPESEED EXTRACT PO) Take by mouth      Omega-3 Fatty Acids (Fish Oil) 1200 MG CPDR Take by mouth      PreviDent 5000 Dry Mouth 1 1 % GEL BRUSH ON TEETH TWICE A DAY MORNING AND NIGHT      QUEtiapine (SEROquel) 400 MG tablet Take 400 mg by mouth daily at bedtime       SUMAtriptan (IMITREX) 100 mg tablet once as needed for migraine       venlafaxine (EFFEXOR-XR) 150 mg 24 hr capsule Take 150 mg by mouth daily      vitamin A 2250 MCG (7500 UT) capsule Take 7,500 Units by mouth daily Pt take 5,000 units      vitamin E 100 UNIT capsule Take 100 Units by mouth daily      zaleplon (SONATA) 10 MG capsule Take 10 mg by mouth daily at bedtime As needed      zinc gluconate 50 mg tablet Take 50 mg by mouth daily 15 mg      clotrimazole-betamethasone (LOTRISONE) 1-0 05 % cream Apply topically 2 (two) times a day for 7 days 30 g 0    docusate sodium (COLACE) 100 mg capsule Take 100 mg by mouth daily  (Patient not taking: Reported on 8/23/2021)      gatifloxacin (ZYMAXID) 0 5 % PLEASE SEE ATTACHED FOR DETAILED DIRECTIONS (Patient not taking: Reported on 8/23/2021)      NON FORMULARY Vision Essentials supplement (Patient not taking: Reported on 8/23/2021)      Prolensa 0 07 % SOLN PLACE 1 DROP DAILY TO SURGICAL EYE STARTING 1 WEEK BEFORE SURGERY & CONTINUE FOR 8 WEEKS AFTER (Patient not taking: Reported on 8/23/2021)      Travatan Z 0 004 % ophthalmic solution PLACE 1 DROP IN BOTH EYES EACH EVENING (Patient not taking: Reported on 8/23/2021)       No current facility-administered medications for this visit         No Known Allergies    Past Medical History:   Diagnosis Date    Abnormal Pap smear of cervix     Anxiety     Colon polyp     Depression     HPV (human papilloma virus) infection Past Surgical History:   Procedure Laterality Date    COLONOSCOPY      [place] XCAPSL CTRC RMVL INSJ IO LENS PROSTH W/O ECP Left 2/9/2021    Procedure: PHACO W/IOL & LRI;  Surgeon: Billy Acuna MD;  Location: [address] [place] [address];  Service: Ophthalmology    [place] XCAPSL CTRC RMVL INSJ IO LENS PROSTH W/O ECP Right 2/23/2021    Procedure: DR JACEY GARCIA [facility] W/IOL & LRI;  Surgeon: Billy Acuna MD;  Location: [address] [place] [address] OR;  Service: Ophthalmology       OB History    No obstetric history on file  No family history on file  The following portions of the patient's history were reviewed and updated as appropriate: allergies, current medications, past family history, past social history, past surgical history and problem list       Objective:    Blood pressure 122/80, pulse 91, temperature (!) 97 2 °F (36 2 °C), temperature source Temporal, resp  rate 16, height 5' 4" (1 626 m), weight 112 kg (246 lb), last menstrual period 05/08/2021, SpO2 96 %, not currently breastfeeding  Body mass index is 42 23 kg/m²  Physical Exam  Constitutional:       Appearance: She is well-developed  HENT:      Head: Normocephalic and atraumatic  Eyes:      Pupils: Pupils are equal, round, and reactive to light  Cardiovascular:      Rate and Rhythm: Normal rate and regular rhythm  Heart sounds: Normal heart sounds  Pulmonary:      Effort: Pulmonary effort is normal  No respiratory distress  Breath sounds: Normal breath sounds  Abdominal:      General: Bowel sounds are normal  There is no distension  Palpations: Abdomen is soft  Abdomen is not rigid  Tenderness: There is no abdominal tenderness  There is no guarding or rebound  Genitourinary:         Comments: -Normal external female genitalia, normal Bartholin's and [place]'s glands                  -Normal midline urethral meatus   No lesions notes                  -Bladder without fullness mass or tenderness                  -Vagina without lesion or discharge No significant cystocele or rectocele noted                  -Cervix normal appearing without visible lesions                  -Uterus with normal contour, mobility  No tenderness,                  -Adnexae without  mass or tenderness                  - Anus without fissure of lesion    Musculoskeletal:         General: Normal range of motion  Cervical back: Normal range of motion and neck supple  Lymphadenopathy:      Cervical: No cervical adenopathy  Upper Body:      Right upper body: No supraclavicular adenopathy  Left upper body: No supraclavicular adenopathy  Skin:     General: Skin is warm and dry  Neurological:      Mental Status: She is alert and oriented to person, place, and time     Psychiatric:         Behavior: Behavior normal

## 2021-08-23 NOTE — TELEPHONE ENCOUNTER
S/W patient, offer 8/27 & 9/2 for Surgery  Those dates did not work for patient  Agreed upon 9/10 SLA for surgery date  Went over sx instructions with patients, instructed to go for labs by 9/3  Patient has questions in regards to the actual procedure  I told her I would reach out to [phone] to see if she could reach out to patient tomorrow to describe the procedure with her, she appreciated that  Patient can be reached @ work #

## 2021-08-23 NOTE — H&P (VIEW-ONLY)
Assessment/Plan:    Problem List Items Addressed This Visit        Genitourinary    AME III (vulvar intraepithelial neoplasia III) - Primary      Patient is very pleasant 51-year-old female  0 with a recent diagnosis of biopsy-proven AME 3  Her exam is consistent with this with a lesion in the posterior fourchette  We discussed with the patient treatment options of Aldara versus resection verses laser and have recommended simple partial vulvectomy  We discussed with the patient risks and benefits of the procedure including bleeding requiring transfusion infection damage to local structures pain with intercourse and vulvar pain  The patient understands and accepts the risks of surgery however has left the office prior to side meaning the surgical consent  We will move ahead with signing this the morning of surgery  I have recommended preop  EKG CBC CPM P due to the patient's history of hypertension and hypercholesterolemia  We discussed with the patient that she could plan to take off between 1 and 4 weeks of work  She could go in earlier if she could make arrangements to not sit for prolonged periods of times as this will likely be uncomfortable  The patient is also aware that intercourse post procedure will be uncomfortable  All questions were answered and the patient will follow-up for surgery         Relevant Orders    Case request operating room: VULVECTOMY SIMPLE   Partial (Completed)    Type and screen    CBC and differential    EKG 12 lead    Comprehensive metabolic panel              CHIEF COMPLAINT:  AME 3        Previous therapy:  Oncology History    No history exists  Patient ID: Lilo Woods is a 50 y o  female   Patient is very pleasant 51-year-old female seen in consultation from Dr Jose M Romo regarding evaluation and management of AME 3        patient notes 1-2 year history of intermittent vulvar pruritus  The patient was prescribed Lotrisone and had  someimprovement  However after discontinuation of the Lotrisone the itching generally came back  She was followed up by Dr Sarina Wang for evaluation  The patient underwent 4 mm punch biopsy of the left labia which revealed the following:   Final Diagnosis  A  Vulva, Left Labia, punch biopsy:   - High-grade squamous intraepithelial lesion/vulvar intraepithelial neoplasia grade 2-3 of usual     type (HSIL/uVIN 2-3), present at peripheral borders of biopsy  -- Confirmed by positive p16/Ki-67; focally increased p53 expression and negative CK7, pCEA,        GCDFP-15, SOX10 immunostains    - Special stains for fungus (PAS) and mucin (mucicarmine) negative  - No invasive carcinoma identified  The patient has also had a recent Pap smear which was unremarkable however HPV 16 was detected  ECC was negative  Patient presents for further evaluation  She notes some vulvar  Itching and burning ongoing for several months  She has no bleeding  She has no pain  Today, the patient is doing well  She denies significant abdominal pain, pelvic pain, nausea, vomiting, constipation, diarrhea, fevers, chills, or vaginal bleeding  She presents now for evaluation and management of AME 3  Review of Systems   Constitutional: Negative  HENT: Negative  Eyes: Negative  Respiratory: Negative  Cardiovascular: Negative  Gastrointestinal: Negative  Endocrine: Negative  Genitourinary: Negative  Vulvar itching and burning intermittent   Musculoskeletal: Negative  Skin: Negative  Neurological: Negative  Hematological: Negative  Psychiatric/Behavioral: Negative          Current Outpatient Medications   Medication Sig Dispense Refill    amLODIPine (NORVASC) 5 mg tablet Take 10 mg by mouth daily      Ascorbic Acid (vitamin C) 100 MG tablet Take 120 mg by mouth daily      cholecalciferol (VITAMIN D3) 1,000 units tablet Take 1,000 Units by mouth daily 2,000      Flaxseed, Linseed, (Flaxseed Oil) 1000 MG CAPS Take by mouth      lovastatin (MEVACOR) 40 MG tablet Take 40 mg by mouth daily       Melatonin 10 MG TABS Take by mouth      NON FORMULARY 2 capsules as needed Herbal laxative      Nutritional Supplements (GRAPESEED EXTRACT PO) Take by mouth      Omega-3 Fatty Acids (Fish Oil) 1200 MG CPDR Take by mouth      PreviDent 5000 Dry Mouth 1 1 % GEL BRUSH ON TEETH TWICE A DAY MORNING AND NIGHT      QUEtiapine (SEROquel) 400 MG tablet Take 400 mg by mouth daily at bedtime       SUMAtriptan (IMITREX) 100 mg tablet once as needed for migraine       venlafaxine (EFFEXOR-XR) 150 mg 24 hr capsule Take 150 mg by mouth daily      vitamin A 2250 MCG (7500 UT) capsule Take 7,500 Units by mouth daily Pt take 5,000 units      vitamin E 100 UNIT capsule Take 100 Units by mouth daily      zaleplon (SONATA) 10 MG capsule Take 10 mg by mouth daily at bedtime As needed      zinc gluconate 50 mg tablet Take 50 mg by mouth daily 15 mg      clotrimazole-betamethasone (LOTRISONE) 1-0 05 % cream Apply topically 2 (two) times a day for 7 days 30 g 0    docusate sodium (COLACE) 100 mg capsule Take 100 mg by mouth daily  (Patient not taking: Reported on 8/23/2021)      gatifloxacin (ZYMAXID) 0 5 % PLEASE SEE ATTACHED FOR DETAILED DIRECTIONS (Patient not taking: Reported on 8/23/2021)      NON FORMULARY Vision Essentials supplement (Patient not taking: Reported on 8/23/2021)      Prolensa 0 07 % SOLN PLACE 1 DROP DAILY TO SURGICAL EYE STARTING 1 WEEK BEFORE SURGERY & CONTINUE FOR 8 WEEKS AFTER (Patient not taking: Reported on 8/23/2021)      Travatan Z 0 004 % ophthalmic solution PLACE 1 DROP IN BOTH EYES EACH EVENING (Patient not taking: Reported on 8/23/2021)       No current facility-administered medications for this visit         No Known Allergies    Past Medical History:   Diagnosis Date    Abnormal Pap smear of cervix     Anxiety     Colon polyp     Depression     HPV (human papilloma virus) infection Past Surgical History:   Procedure Laterality Date    COLONOSCOPY      [place] XCAPSL CTRC RMVL INSJ IO LENS PROSTH W/O ECP Left 2/9/2021    Procedure: PHACO W/IOL & LRI;  Surgeon: Levon Zazueta MD;  Location: [facility] MAIN OR;  Service: Ophthalmology    [place] XCAPSL CTRC RMVL INSJ IO LENS PROSTH W/O ECP Right 2/23/2021    Procedure: DR JACEY GARCIA [facility] W/IOL & LRI;  Surgeon: Levon Zazueta MD;  Location: [facility] MAIN OR;  Service: Ophthalmology       OB History    No obstetric history on file  No family history on file  The following portions of the patient's history were reviewed and updated as appropriate: allergies, current medications, past family history, past social history, past surgical history and problem list       Objective:    Blood pressure 122/80, pulse 91, temperature (!) 97 2 °F (36 2 °C), temperature source Temporal, resp  rate 16, height 5' 4" (1 626 m), weight 112 kg (246 lb), last menstrual period 05/08/2021, SpO2 96 %, not currently breastfeeding  Body mass index is 42 23 kg/m²  Physical Exam  Constitutional:       Appearance: She is well-developed  HENT:      Head: Normocephalic and atraumatic  Eyes:      Pupils: Pupils are equal, round, and reactive to light  Cardiovascular:      Rate and Rhythm: Normal rate and regular rhythm  Heart sounds: Normal heart sounds  Pulmonary:      Effort: Pulmonary effort is normal  No respiratory distress  Breath sounds: Normal breath sounds  Abdominal:      General: Bowel sounds are normal  There is no distension  Palpations: Abdomen is soft  Abdomen is not rigid  Tenderness: There is no abdominal tenderness  There is no guarding or rebound  Genitourinary:         Comments: -Normal external female genitalia, normal Bartholin's and [place]'s glands                  -Normal midline urethral meatus   No lesions notes                  -Bladder without fullness mass or tenderness                  -Vagina without lesion or discharge No significant cystocele or rectocele noted                  -Cervix normal appearing without visible lesions                  -Uterus with normal contour, mobility  No tenderness,                  -Adnexae without  mass or tenderness                  - Anus without fissure of lesion    Musculoskeletal:         General: Normal range of motion  Cervical back: Normal range of motion and neck supple  Lymphadenopathy:      Cervical: No cervical adenopathy  Upper Body:      Right upper body: No supraclavicular adenopathy  Left upper body: No supraclavicular adenopathy  Skin:     General: Skin is warm and dry  Neurological:      Mental Status: She is alert and oriented to person, place, and time     Psychiatric:         Behavior: Behavior normal

## 2021-08-23 NOTE — LETTER
2021     Karli Mijares MD  [address] [facility]    Patient: Woody Nguyen   YOB: 1965   Date of Visit: 2021       Dear Dr Rhonda Mancera: Thank you for referring Michelle Hoffman to me for evaluation  Below are my notes for this consultation  If you have questions, please do not hesitate to call me  I look forward to following your patient along with you  Sincerely,        Jaya Mcneal MD        CC: DO Dallas Mariano Marzette Pavlov, MD  2021 12:15 PM  Sign when Signing Visit  Assessment/Plan:    Problem List Items Addressed This Visit        Genitourinary    AME III (vulvar intraepithelial neoplasia III) - Primary      Patient is very pleasant 53-year-old female  0 with a recent diagnosis of biopsy-proven AME 3  Her exam is consistent with this with a lesion in the posterior fourchette  We discussed with the patient treatment options of Aldara versus resection verses laser and have recommended simple partial vulvectomy  We discussed with the patient risks and benefits of the procedure including bleeding requiring transfusion infection damage to local structures pain with intercourse and vulvar pain  The patient understands and accepts the risks of surgery however has left the office prior to side meaning the surgical consent  We will move ahead with signing this the morning of surgery  I have recommended preop  EKG CBC CPM P due to the patient's history of hypertension and hypercholesterolemia  We discussed with the patient that she could plan to take off between 1 and 4 weeks of work  She could go in earlier if she could make arrangements to not sit for prolonged periods of times as this will likely be uncomfortable  The patient is also aware that intercourse post procedure will be uncomfortable    All questions were answered and the patient will follow-up for surgery         Relevant Orders    Case request operating room: VULVECTOMY SIMPLE   Partial (Completed)    Type and screen    CBC and differential    EKG 12 lead    Comprehensive metabolic panel              CHIEF COMPLAINT:  AME 3        Previous therapy:  Oncology History    No history exists  Patient ID: Natali Arroyo is a 50 y o  female   Patient is very pleasant 17-year-old female seen in consultation from Dr Robert Hendricks regarding evaluation and management of AME 3        patient notes 1-2 year history of intermittent vulvar pruritus  The patient was prescribed Lotrisone and had  someimprovement  However after discontinuation of the Lotrisone the itching generally came back  She was followed up by Dr Alexandria Haddad for evaluation  The patient underwent 4 mm punch biopsy of the left labia which revealed the following:   Final Diagnosis  A  Vulva, Left Labia, punch biopsy:   - High-grade squamous intraepithelial lesion/vulvar intraepithelial neoplasia grade 2-3 of usual     type (HSIL/uVIN 2-3), present at peripheral borders of biopsy  -- Confirmed by positive p16/Ki-67; focally increased p53 expression and negative CK7, pCEA,        GCDFP-15, SOX10 immunostains    - Special stains for fungus (PAS) and mucin (mucicarmine) negative  - No invasive carcinoma identified  The patient has also had a recent Pap smear which was unremarkable however HPV 16 was detected  ECC was negative  Patient presents for further evaluation  She notes some vulvar  Itching and burning ongoing for several months  She has no bleeding  She has no pain  Today, the patient is doing well  She denies significant abdominal pain, pelvic pain, nausea, vomiting, constipation, diarrhea, fevers, chills, or vaginal bleeding  She presents now for evaluation and management of AME 3  Review of Systems   Constitutional: Negative  HENT: Negative  Eyes: Negative  Respiratory: Negative  Cardiovascular: Negative  Gastrointestinal: Negative  Endocrine: Negative  Genitourinary: Negative  Vulvar itching and burning intermittent   Musculoskeletal: Negative  Skin: Negative  Neurological: Negative  Hematological: Negative  Psychiatric/Behavioral: Negative          Current Outpatient Medications   Medication Sig Dispense Refill    amLODIPine (NORVASC) 5 mg tablet Take 10 mg by mouth daily      Ascorbic Acid (vitamin C) 100 MG tablet Take 120 mg by mouth daily      cholecalciferol (VITAMIN D3) 1,000 units tablet Take 1,000 Units by mouth daily 2,000      Flaxseed, Linseed, (Flaxseed Oil) 1000 MG CAPS Take by mouth      lovastatin (MEVACOR) 40 MG tablet Take 40 mg by mouth daily       Melatonin 10 MG TABS Take by mouth      NON FORMULARY 2 capsules as needed Herbal laxative      Nutritional Supplements (GRAPESEED EXTRACT PO) Take by mouth      Omega-3 Fatty Acids (Fish Oil) 1200 MG CPDR Take by mouth      PreviDent 5000 Dry Mouth 1 1 % GEL BRUSH ON TEETH TWICE A DAY MORNING AND NIGHT      QUEtiapine (SEROquel) 400 MG tablet Take 400 mg by mouth daily at bedtime       SUMAtriptan (IMITREX) 100 mg tablet once as needed for migraine       venlafaxine (EFFEXOR-XR) 150 mg 24 hr capsule Take 150 mg by mouth daily      vitamin A 2250 MCG (7500 UT) capsule Take 7,500 Units by mouth daily Pt take 5,000 units      vitamin E 100 UNIT capsule Take 100 Units by mouth daily      zaleplon (SONATA) 10 MG capsule Take 10 mg by mouth daily at bedtime As needed      zinc gluconate 50 mg tablet Take 50 mg by mouth daily 15 mg      clotrimazole-betamethasone (LOTRISONE) 1-0 05 % cream Apply topically 2 (two) times a day for 7 days 30 g 0    docusate sodium (COLACE) 100 mg capsule Take 100 mg by mouth daily  (Patient not taking: Reported on 8/23/2021)      gatifloxacin (ZYMAXID) 0 5 % PLEASE SEE ATTACHED FOR DETAILED DIRECTIONS (Patient not taking: Reported on 8/23/2021)      NON FORMULARY [employer] supplement (Patient not taking: Reported on 8/23/2021)      Prolensa 0 07 % SOLN PLACE 1 DROP DAILY TO SURGICAL EYE STARTING 1 WEEK BEFORE SURGERY & CONTINUE FOR 8 WEEKS AFTER (Patient not taking: Reported on 8/23/2021)      Travatan Z 0 004 % ophthalmic solution PLACE 1 DROP IN BOTH EYES EACH EVENING (Patient not taking: Reported on 8/23/2021)       No current facility-administered medications for this visit  No Known Allergies    Past Medical History:   Diagnosis Date    Abnormal Pap smear of cervix     Anxiety     Colon polyp     Depression     HPV (human papilloma virus) infection        Past Surgical History:   Procedure Laterality Date    COLONOSCOPY      OR XCAPSL CTRC RMVL INSJ IO LENS PROSTH W/O ECP Left 2/9/2021    Procedure: PHACO W/IOL & LRI;  Surgeon: Bill Woodard MD;  Location: [facility] MAIN OR;  Service: Ophthalmology    OR XCAPSL CTRC RMVL INSJ IO LENS PROSTH W/O ECP Right 2/23/2021    Procedure: DR JACEY GARCIA [facility] W/IOL & LRI;  Surgeon: Bill Woodard MD;  Location: [facility] MAIN OR;  Service: Ophthalmology       OB History    No obstetric history on file  No family history on file  The following portions of the patient's history were reviewed and updated as appropriate: allergies, current medications, past family history, past social history, past surgical history and problem list       Objective:    Blood pressure 122/80, pulse 91, temperature (!) 97 2 °F (36 2 °C), temperature source Temporal, resp  rate 16, height 5' 4" (1 626 m), weight 112 kg (246 lb), last menstrual period 05/08/2021, SpO2 96 %, not currently breastfeeding  Body mass index is 42 23 kg/m²  Physical Exam  Constitutional:       Appearance: She is well-developed  HENT:      Head: Normocephalic and atraumatic  Eyes:      Pupils: Pupils are equal, round, and reactive to light  Cardiovascular:      Rate and Rhythm: Normal rate and regular rhythm  Heart sounds: Normal heart sounds     Pulmonary:      Effort: Pulmonary effort is normal  No respiratory distress  Breath sounds: Normal breath sounds  Abdominal:      General: Bowel sounds are normal  There is no distension  Palpations: Abdomen is soft  Abdomen is not rigid  Tenderness: There is no abdominal tenderness  There is no guarding or rebound  Genitourinary:         Comments: -Normal external female genitalia, normal Bartholin's and Henning's glands                  -Normal midline urethral meatus  No lesions notes                  -Bladder without fullness mass or tenderness                  -Vagina without lesion or discharge No significant cystocele or rectocele noted                  -Cervix normal appearing without visible lesions                  -Uterus with normal contour, mobility  No tenderness,                  -Adnexae without  mass or tenderness                  - Anus without fissure of lesion    Musculoskeletal:         General: Normal range of motion  Cervical back: Normal range of motion and neck supple  Lymphadenopathy:      Cervical: No cervical adenopathy  Upper Body:      Right upper body: No supraclavicular adenopathy  Left upper body: No supraclavicular adenopathy  Skin:     General: Skin is warm and dry  Neurological:      Mental Status: She is alert and oriented to person, place, and time     Psychiatric:         Behavior: Behavior normal

## 2021-08-23 NOTE — TELEPHONE ENCOUNTER
I called patient and left VM on work and home numbers for patient to call back to discuss surgery date

## 2021-08-23 NOTE — ASSESSMENT & PLAN NOTE
Patient is very pleasant 62-year-old female  0 with a recent diagnosis of biopsy-proven AME 3  Her exam is consistent with this with a lesion in the posterior fourchette  We discussed with the patient treatment options of Aldara versus resection verses laser and have recommended simple partial vulvectomy  We discussed with the patient risks and benefits of the procedure including bleeding requiring transfusion infection damage to local structures pain with intercourse and vulvar pain  The patient understands and accepts the risks of surgery however has left the office prior to side meaning the surgical consent  We will move ahead with signing this the morning of surgery  I have recommended preop  EKG CBC CPM P due to the patient's history of hypertension and hypercholesterolemia  We discussed with the patient that she could plan to take off between 1 and 4 weeks of work  She could go in earlier if she could make arrangements to not sit for prolonged periods of times as this will likely be uncomfortable  The patient is also aware that intercourse post procedure will be uncomfortable    All questions were answered and the patient will follow-up for surgery

## 2021-08-24 ENCOUNTER — TELEPHONE (OUTPATIENT)
Dept: CARDIAC SURGERY | Facility: CLINIC | Age: 56
End: 2021-08-24

## 2021-08-24 NOTE — TELEPHONE ENCOUNTER
Patient called back in regards to the message she received yesterday from Dr Rollene Bloch and not signing papers  I informed her that she did not sign her consent in the office today, Dr Rollene Bloch ok'd having consent signed on admit the day of surgery  Patient understood

## 2021-08-25 ENCOUNTER — TELEPHONE (OUTPATIENT)
Dept: HEMATOLOGY ONCOLOGY | Facility: CLINIC | Age: 56
End: 2021-08-25

## 2021-08-25 NOTE — TELEPHONE ENCOUNTER
Patient called in regards to [facility] paperwork that need to be completed ASAP and is due 15 days from today  Patient does not want it faxed  Her only option would be to drop it off  She would like to have a return call in regards to her [facility] papers  Please advise

## 2021-08-27 NOTE — TELEPHONE ENCOUNTER
I called and spoke to patient on Wed  I gave her my email address and received her paperwork  This [place] is in process

## 2021-08-30 ENCOUNTER — TELEPHONE (OUTPATIENT)
Dept: HEMATOLOGY ONCOLOGY | Facility: CLINIC | Age: 56
End: 2021-08-30

## 2021-08-30 NOTE — TELEPHONE ENCOUNTER
Yesenia Neumann was calling to see if the patient needed clearance forms prior to her surgery on Sept  10  Please follow-up with the patient

## 2021-08-31 ENCOUNTER — CLINICAL SUPPORT (OUTPATIENT)
Dept: URGENT CARE | Facility: MEDICAL CENTER | Age: 56
End: 2021-08-31
Payer: COMMERCIAL

## 2021-08-31 ENCOUNTER — APPOINTMENT (OUTPATIENT)
Dept: LAB | Facility: MEDICAL CENTER | Age: 56
End: 2021-08-31
Payer: COMMERCIAL

## 2021-08-31 DIAGNOSIS — D07.1 VIN III (VULVAR INTRAEPITHELIAL NEOPLASIA III): ICD-10-CM

## 2021-08-31 DIAGNOSIS — F31.9 DEPRESSED BIPOLAR I DISORDER (HCC): ICD-10-CM

## 2021-08-31 DIAGNOSIS — F42.9 OBSESSIVE-COMPULSIVE DISORDER, UNSPECIFIED TYPE: ICD-10-CM

## 2021-08-31 DIAGNOSIS — E78.5 HYPERLIPIDEMIA, UNSPECIFIED HYPERLIPIDEMIA TYPE: ICD-10-CM

## 2021-08-31 DIAGNOSIS — I10 ESSENTIAL HYPERTENSION, MALIGNANT: ICD-10-CM

## 2021-08-31 DIAGNOSIS — R73.01 IMPAIRED FASTING GLUCOSE: ICD-10-CM

## 2021-08-31 LAB
ABO GROUP BLD: NORMAL
ALBUMIN SERPL BCP-MCNC: 3.5 G/DL (ref 3.5–5)
ALP SERPL-CCNC: 57 U/L (ref 46–116)
ALT SERPL W P-5'-P-CCNC: 61 U/L (ref 12–78)
ANION GAP SERPL CALCULATED.3IONS-SCNC: 6 MMOL/L (ref 4–13)
AST SERPL W P-5'-P-CCNC: 28 U/L (ref 5–45)
ATRIAL RATE: 73 BPM
BASOPHILS # BLD AUTO: 0.05 THOUSANDS/ΜL (ref 0–0.1)
BASOPHILS NFR BLD AUTO: 1 % (ref 0–1)
BILIRUB SERPL-MCNC: 0.45 MG/DL (ref 0.2–1)
BLD GP AB SCN SERPL QL: NEGATIVE
BUN SERPL-MCNC: 12 MG/DL (ref 5–25)
CALCIUM SERPL-MCNC: 9.3 MG/DL (ref 8.3–10.1)
CHLORIDE SERPL-SCNC: 107 MMOL/L (ref 100–108)
CHOLEST SERPL-MCNC: 173 MG/DL (ref 50–200)
CO2 SERPL-SCNC: 29 MMOL/L (ref 21–32)
CREAT SERPL-MCNC: 0.64 MG/DL (ref 0.6–1.3)
EOSINOPHIL # BLD AUTO: 0.23 THOUSAND/ΜL (ref 0–0.61)
EOSINOPHIL NFR BLD AUTO: 3 % (ref 0–6)
ERYTHROCYTE [DISTWIDTH] IN BLOOD BY AUTOMATED COUNT: 12.8 % (ref 11.6–15.1)
EST. AVERAGE GLUCOSE BLD GHB EST-MCNC: 111 MG/DL
GFR SERPL CREATININE-BSD FRML MDRD: 102 ML/MIN/1.73SQ M
GLUCOSE P FAST SERPL-MCNC: 106 MG/DL (ref 65–99)
HBA1C MFR BLD: 5.5 %
HCT VFR BLD AUTO: 43.5 % (ref 34.8–46.1)
HDLC SERPL-MCNC: 52 MG/DL
HGB BLD-MCNC: 14.8 G/DL (ref 11.5–15.4)
IMM GRANULOCYTES # BLD AUTO: 0.04 THOUSAND/UL (ref 0–0.2)
IMM GRANULOCYTES NFR BLD AUTO: 1 % (ref 0–2)
LDLC SERPL CALC-MCNC: 94 MG/DL (ref 0–100)
LYMPHOCYTES # BLD AUTO: 2.87 THOUSANDS/ΜL (ref 0.6–4.47)
LYMPHOCYTES NFR BLD AUTO: 33 % (ref 14–44)
MCH RBC QN AUTO: 31.2 PG (ref 26.8–34.3)
MCHC RBC AUTO-ENTMCNC: 34 G/DL (ref 31.4–37.4)
MCV RBC AUTO: 92 FL (ref 82–98)
MONOCYTES # BLD AUTO: 0.68 THOUSAND/ΜL (ref 0.17–1.22)
MONOCYTES NFR BLD AUTO: 8 % (ref 4–12)
NEUTROPHILS # BLD AUTO: 4.76 THOUSANDS/ΜL (ref 1.85–7.62)
NEUTS SEG NFR BLD AUTO: 54 % (ref 43–75)
NONHDLC SERPL-MCNC: 121 MG/DL
NRBC BLD AUTO-RTO: 0 /100 WBCS
P AXIS: 21 DEGREES
PLATELET # BLD AUTO: 283 THOUSANDS/UL (ref 149–390)
PMV BLD AUTO: 9.9 FL (ref 8.9–12.7)
POTASSIUM SERPL-SCNC: 4.1 MMOL/L (ref 3.5–5.3)
PR INTERVAL: 170 MS
PROT SERPL-MCNC: 6.9 G/DL (ref 6.4–8.2)
QRS AXIS: -36 DEGREES
QRSD INTERVAL: 124 MS
QT INTERVAL: 452 MS
QTC INTERVAL: 497 MS
RBC # BLD AUTO: 4.75 MILLION/UL (ref 3.81–5.12)
RH BLD: NEGATIVE
SODIUM SERPL-SCNC: 142 MMOL/L (ref 136–145)
SPECIMEN EXPIRATION DATE: NORMAL
T WAVE AXIS: 16 DEGREES
TRIGL SERPL-MCNC: 134 MG/DL
VENTRICULAR RATE: 73 BPM
WBC # BLD AUTO: 8.63 THOUSAND/UL (ref 4.31–10.16)

## 2021-08-31 PROCEDURE — 80061 LIPID PANEL: CPT

## 2021-08-31 PROCEDURE — 86901 BLOOD TYPING SEROLOGIC RH(D): CPT

## 2021-08-31 PROCEDURE — 86900 BLOOD TYPING SEROLOGIC ABO: CPT

## 2021-08-31 PROCEDURE — 83036 HEMOGLOBIN GLYCOSYLATED A1C: CPT

## 2021-08-31 PROCEDURE — 93010 ELECTROCARDIOGRAM REPORT: CPT | Performed by: INTERNAL MEDICINE

## 2021-08-31 PROCEDURE — 85025 COMPLETE CBC W/AUTO DIFF WBC: CPT

## 2021-08-31 PROCEDURE — 86850 RBC ANTIBODY SCREEN: CPT

## 2021-08-31 PROCEDURE — 80053 COMPREHEN METABOLIC PANEL: CPT

## 2021-08-31 PROCEDURE — 93005 ELECTROCARDIOGRAM TRACING: CPT

## 2021-08-31 PROCEDURE — 36415 COLL VENOUS BLD VENIPUNCTURE: CPT

## 2021-09-02 ENCOUNTER — TELEPHONE (OUTPATIENT)
Dept: SURGICAL ONCOLOGY | Facility: CLINIC | Age: 56
End: 2021-09-02

## 2021-09-02 ENCOUNTER — TELEPHONE (OUTPATIENT)
Dept: HEMATOLOGY ONCOLOGY | Facility: CLINIC | Age: 56
End: 2021-09-02

## 2021-09-02 NOTE — TELEPHONE ENCOUNTER
Patient called stating she had pre-op blood work completed on 8/31/21 and would like to make sure her results were reviewed and she is ok to go ahead with her surgery which is scheduled for 9/10/21  Please advise

## 2021-09-02 NOTE — TELEPHONE ENCOUNTER
Both forms are completed and emailed back to patient  She still had employee portions to fill out  Patient will then submit these forms to her employer

## 2021-09-07 RX ORDER — ACETAMINOPHEN, ASPIRIN AND CAFFEINE 250; 250; 65 MG/1; MG/1; MG/1
1 TABLET, FILM COATED ORAL EVERY 6 HOURS PRN
COMMUNITY

## 2021-09-07 NOTE — PRE-PROCEDURE INSTRUCTIONS
Pre-Surgery Instructions:   Medication Instructions    amLODIPine (NORVASC) 5 mg tablet Instructed patient per Anesthesia Guidelines  Take morning of surgery with sip water    aspirin-acetaminophen-caffeine (EXCEDRIN MIGRAINE) 250-250-65 MG per tablet take as directed    cholecalciferol (VITAMIN D3) 1,000 units tablet Instructed patient per Anesthesia Guidelines  Stop 9/7    Flaxseed, Linseed, (Flaxseed Oil) 1000 MG CAPS Instructed patient per Anesthesia Guidelines  Stop 9/7    lovastatin (MEVACOR) 40 MG tablet Instructed patient per Anesthesia Guidelines  Take morning of surgery with sip water    Melatonin 10 MG TABS do not take morning of surgery    NON FORMULARY Instructed patient per Anesthesia Guidelines  Stop 9/7    Nutritional Supplements (GRAPESEED EXTRACT PO) Instructed patient per Anesthesia Guidelines  Stop 9/7    Omega-3 Fatty Acids (Fish Oil) 1200 MG CPDR Instructed patient per Anesthesia Guidelines  Stop 9/7    PreviDent 5000 Dry Mouth 1 1 % GEL use as directed    QUEtiapine (SEROquel) 400 MG tablet Instructed patient per Anesthesia Guidelines  Takes nightly-do not take morning of surgery    SUMAtriptan (IMITREX) 100 mg tablet Instructed patient per Anesthesia Guidelines  Take morning of surgery with sip water if needed    venlafaxine (EFFEXOR-XR) 150 mg 24 hr capsule Instructed patient per Anesthesia Guidelines  Take morning of surgery with sip water    vitamin A 2250 MCG (7500 UT) capsule Do not take morning of surgery    zaleplon (SONATA) 10 MG capsule Instructed patient per Anesthesia Guidelines  Do not take morning of surgery   Covid screening negative as per patient  Fully vaccinated  Reviewed pre op medicine and showering instructions with patient via phone call, verbalizes understanding  Advised patient to stop taking vitamins, herbal meds, NSAID'S and ASA pre op as of 9/7 but may take Tylenol products if needed   Advised NPO after MN (9/9) and ASC will call (9/9) with scheduled surgical  time  Pt verbalized understanding     Patient completed sleep study this past weekend and is interested in referral to weight management program

## 2021-09-08 ENCOUNTER — ANESTHESIA EVENT (OUTPATIENT)
Dept: PERIOP | Facility: HOSPITAL | Age: 56
End: 2021-09-08
Payer: COMMERCIAL

## 2021-09-09 PROBLEM — Z98.890 PONV (POSTOPERATIVE NAUSEA AND VOMITING): Status: ACTIVE | Noted: 2021-09-09

## 2021-09-09 PROBLEM — R11.2 PONV (POSTOPERATIVE NAUSEA AND VOMITING): Status: ACTIVE | Noted: 2021-09-09

## 2021-09-10 ENCOUNTER — ANESTHESIA (OUTPATIENT)
Dept: PERIOP | Facility: HOSPITAL | Age: 56
End: 2021-09-10
Payer: COMMERCIAL

## 2021-09-10 ENCOUNTER — HOSPITAL ENCOUNTER (OUTPATIENT)
Facility: HOSPITAL | Age: 56
Setting detail: OUTPATIENT SURGERY
Discharge: HOME | End: 2021-09-10
Attending: OBSTETRICS & GYNECOLOGY | Admitting: OBSTETRICS & GYNECOLOGY
Payer: COMMERCIAL

## 2021-09-10 VITALS
RESPIRATION RATE: 14 BRPM | SYSTOLIC BLOOD PRESSURE: 146 MMHG | DIASTOLIC BLOOD PRESSURE: 67 MMHG | WEIGHT: 246 LBS | BODY MASS INDEX: 42 KG/M2 | HEIGHT: 64 IN | HEART RATE: 81 BPM | TEMPERATURE: 97.9 F | OXYGEN SATURATION: 96 %

## 2021-09-10 DIAGNOSIS — D07.1 VIN III (VULVAR INTRAEPITHELIAL NEOPLASIA III): ICD-10-CM

## 2021-09-10 DIAGNOSIS — G89.18 POSTOPERATIVE PAIN: Primary | ICD-10-CM

## 2021-09-10 LAB
ABO GROUP BLD: NORMAL
EXT PREGNANCY TEST URINE: NEGATIVE
EXT. CONTROL: NORMAL
RH BLD: NEGATIVE

## 2021-09-10 PROCEDURE — 88342 IMHCHEM/IMCYTCHM 1ST ANTB: CPT | Performed by: PATHOLOGY

## 2021-09-10 PROCEDURE — 81025 URINE PREGNANCY TEST: CPT | Performed by: OBSTETRICS & GYNECOLOGY

## 2021-09-10 PROCEDURE — 88341 IMHCHEM/IMCYTCHM EA ADD ANTB: CPT | Performed by: PATHOLOGY

## 2021-09-10 PROCEDURE — 88309 TISSUE EXAM BY PATHOLOGIST: CPT | Performed by: PATHOLOGY

## 2021-09-10 PROCEDURE — 88344 IMHCHEM/IMCYTCHM EA MLT ANTB: CPT | Performed by: PATHOLOGY

## 2021-09-10 PROCEDURE — 56620 VULVECTOMY SIMPLE PARTIAL: CPT | Performed by: OBSTETRICS & GYNECOLOGY

## 2021-09-10 RX ORDER — OXYCODONE HYDROCHLORIDE 5 MG/1
TABLET ORAL
Qty: 30 TABLET | Refills: 0 | Status: SHIPPED | OUTPATIENT
Start: 2021-09-10 | End: 2022-02-01 | Stop reason: HOSPADM

## 2021-09-10 RX ORDER — OXYCODONE HYDROCHLORIDE 5 MG/1
5 TABLET ORAL EVERY 4 HOURS PRN
Status: DISCONTINUED | OUTPATIENT
Start: 2021-09-10 | End: 2021-09-10 | Stop reason: HOSPADM

## 2021-09-10 RX ORDER — EPHEDRINE SULFATE 50 MG/ML
INJECTION INTRAVENOUS AS NEEDED
Status: DISCONTINUED | OUTPATIENT
Start: 2021-09-10 | End: 2021-09-10

## 2021-09-10 RX ORDER — SODIUM CHLORIDE 9 MG/ML
125 INJECTION, SOLUTION INTRAVENOUS CONTINUOUS
Status: DISCONTINUED | OUTPATIENT
Start: 2021-09-10 | End: 2021-09-10 | Stop reason: HOSPADM

## 2021-09-10 RX ORDER — CEFAZOLIN SODIUM 2 G/50ML
2000 SOLUTION INTRAVENOUS ONCE
Status: COMPLETED | OUTPATIENT
Start: 2021-09-10 | End: 2021-09-10

## 2021-09-10 RX ORDER — BUPIVACAINE HYDROCHLORIDE 5 MG/ML
INJECTION, SOLUTION PERINEURAL AS NEEDED
Status: DISCONTINUED | OUTPATIENT
Start: 2021-09-10 | End: 2021-09-10 | Stop reason: HOSPADM

## 2021-09-10 RX ORDER — ACETAMINOPHEN 325 MG/1
975 TABLET ORAL EVERY 6 HOURS PRN
Status: DISCONTINUED | OUTPATIENT
Start: 2021-09-10 | End: 2021-09-10 | Stop reason: HOSPADM

## 2021-09-10 RX ORDER — MIDAZOLAM HYDROCHLORIDE 2 MG/2ML
INJECTION, SOLUTION INTRAMUSCULAR; INTRAVENOUS AS NEEDED
Status: DISCONTINUED | OUTPATIENT
Start: 2021-09-10 | End: 2021-09-10

## 2021-09-10 RX ORDER — ONDANSETRON 2 MG/ML
4 INJECTION INTRAMUSCULAR; INTRAVENOUS ONCE AS NEEDED
Status: DISCONTINUED | OUTPATIENT
Start: 2021-09-10 | End: 2021-09-10 | Stop reason: HOSPADM

## 2021-09-10 RX ORDER — FENTANYL CITRATE/PF 50 MCG/ML
25 SYRINGE (ML) INJECTION
Status: DISCONTINUED | OUTPATIENT
Start: 2021-09-10 | End: 2021-09-10 | Stop reason: HOSPADM

## 2021-09-10 RX ORDER — PROPOFOL 10 MG/ML
INJECTION, EMULSION INTRAVENOUS AS NEEDED
Status: DISCONTINUED | OUTPATIENT
Start: 2021-09-10 | End: 2021-09-10

## 2021-09-10 RX ORDER — KETOROLAC TROMETHAMINE 30 MG/ML
INJECTION, SOLUTION INTRAMUSCULAR; INTRAVENOUS AS NEEDED
Status: DISCONTINUED | OUTPATIENT
Start: 2021-09-10 | End: 2021-09-10

## 2021-09-10 RX ORDER — LIDOCAINE HYDROCHLORIDE 20 MG/ML
INJECTION, SOLUTION EPIDURAL; INFILTRATION; INTRACAUDAL; PERINEURAL AS NEEDED
Status: DISCONTINUED | OUTPATIENT
Start: 2021-09-10 | End: 2021-09-10

## 2021-09-10 RX ORDER — DEXAMETHASONE SODIUM PHOSPHATE 4 MG/ML
INJECTION, SOLUTION INTRA-ARTICULAR; INTRALESIONAL; INTRAMUSCULAR; INTRAVENOUS; SOFT TISSUE AS NEEDED
Status: DISCONTINUED | OUTPATIENT
Start: 2021-09-10 | End: 2021-09-10

## 2021-09-10 RX ORDER — OXYCODONE HYDROCHLORIDE 5 MG/1
10 TABLET ORAL EVERY 4 HOURS PRN
Status: DISCONTINUED | OUTPATIENT
Start: 2021-09-10 | End: 2021-09-10 | Stop reason: HOSPADM

## 2021-09-10 RX ORDER — ACETAMINOPHEN 325 MG/1
975 TABLET ORAL ONCE
Status: DISCONTINUED | OUTPATIENT
Start: 2021-09-10 | End: 2021-09-10 | Stop reason: HOSPADM

## 2021-09-10 RX ORDER — ONDANSETRON 2 MG/ML
INJECTION INTRAMUSCULAR; INTRAVENOUS AS NEEDED
Status: DISCONTINUED | OUTPATIENT
Start: 2021-09-10 | End: 2021-09-10

## 2021-09-10 RX ADMIN — SODIUM CHLORIDE: 0.9 INJECTION, SOLUTION INTRAVENOUS at 08:22

## 2021-09-10 RX ADMIN — EPHEDRINE SULFATE 5 MG: 50 INJECTION, SOLUTION INTRAVENOUS at 07:59

## 2021-09-10 RX ADMIN — EPHEDRINE SULFATE 10 MG: 50 INJECTION, SOLUTION INTRAVENOUS at 08:02

## 2021-09-10 RX ADMIN — PROPOFOL 200 MG: 10 INJECTION, EMULSION INTRAVENOUS at 07:25

## 2021-09-10 RX ADMIN — MIDAZOLAM 2 MG: 1 INJECTION INTRAMUSCULAR; INTRAVENOUS at 07:25

## 2021-09-10 RX ADMIN — ONDANSETRON 4 MG: 2 INJECTION INTRAMUSCULAR; INTRAVENOUS at 07:41

## 2021-09-10 RX ADMIN — SODIUM CHLORIDE 125 ML/HR: 0.9 INJECTION, SOLUTION INTRAVENOUS at 06:08

## 2021-09-10 RX ADMIN — ENOXAPARIN SODIUM 40 MG: 40 INJECTION SUBCUTANEOUS at 06:12

## 2021-09-10 RX ADMIN — LIDOCAINE HYDROCHLORIDE 60 MG: 20 INJECTION, SOLUTION EPIDURAL; INFILTRATION; INTRACAUDAL; PERINEURAL at 07:25

## 2021-09-10 RX ADMIN — EPHEDRINE SULFATE 5 MG: 50 INJECTION, SOLUTION INTRAVENOUS at 08:21

## 2021-09-10 RX ADMIN — DEXAMETHASONE SODIUM PHOSPHATE 4 MG: 4 INJECTION INTRA-ARTICULAR; INTRALESIONAL; INTRAMUSCULAR; INTRAVENOUS; SOFT TISSUE at 07:25

## 2021-09-10 RX ADMIN — KETOROLAC TROMETHAMINE 30 MG: 30 INJECTION, SOLUTION INTRAMUSCULAR; INTRAVENOUS at 07:40

## 2021-09-10 RX ADMIN — CEFAZOLIN SODIUM 2000 MG: 2 SOLUTION INTRAVENOUS at 07:22

## 2021-09-10 NOTE — DISCHARGE INSTRUCTIONS
Vulvectomy  WHAT YOU NEED TO KNOW:   Itching, discomfort, and spotty bleeding are normal for the first days after your biopsy  If you have stitches, it may take up to 2 weeks for them to dissolve  It may take up to 3 weeks to get the results of your biopsy  DISCHARGE INSTRUCTIONS:   Go to the emergency department if: In the first 3 days after your biopsy:   · You have a fever of 102°F (38 8°C) or higher  · You have increased pain or bleeding  Call your doctor if:   · You have a fever of 100 4°F (38°C) or higher  · You have foul-smelling drainage from the area  · You have increased redness or warmth in the biopsy area  · You have questions or concerns about your condition or care  Medicines: You may need any of the following:  · Acetaminophen  decreases pain and fever  It is available without a doctor's order  Ask how much to take and how often to take it  Follow directions  Read the labels of all other medicines you are using to see if they also contain acetaminophen, or ask your doctor or pharmacist  Acetaminophen can cause liver damage if not taken correctly  Do not use more than 4 grams (4,000 milligrams) total of acetaminophen in one day  · NSAIDs , such as ibuprofen, help decrease swelling, pain, and fever  This medicine is available with or without a doctor's order  NSAIDs can cause stomach bleeding or kidney problems in certain people  If you take blood thinner medicine, always ask your healthcare provider if NSAIDs are safe for you  Always read the medicine label and follow directions  · Take your medicine as directed  Contact your healthcare provider if you think your medicine is not helping or if you have side effects  Tell him or her if you are allergic to any medicine  Keep a list of the medicines, vitamins, and herbs you take  Include the amounts, and when and why you take them  Bring the list or the pill bottles to follow-up visits   Carry your medicine list with you in case of an emergency  Care for the biopsy area:  Keep the area clean and dry  When you are able to shower, wash the area with soap and warm water  Pat the area dry  Do not  rub the area  Rubbing can cause bleeding and discomfort  You may be told to put a small amount of petroleum jelly on the biopsy area  It can prevent discomfort when you urinate  Do not have sex until the area is healed  Follow up with your healthcare provider as directed: You may need to return to get the results of your biopsy  You may also need more tests  Write down your questions so you remember to ask them during your visits  © Copyright [employer] 2021 Information is for End User's use only and may not be sold, redistributed or otherwise used for commercial purposes  All illustrations and images included in CareNotes® are the copyrighted property of [employer]  or Goyo Mcgee   The above information is an  only  It is not intended as medical advice for individual conditions or treatments  Talk to your doctor, nurse or pharmacist before following any medical regimen to see if it is safe and effective for you

## 2021-09-10 NOTE — OP NOTE
OPERATIVE REPORT  PATIENT NAME: [facility]    :  1965  MRN: [id number]  Pt Location: [facility] OR ROOM 08    SURGERY DATE: 9/10/2021    Surgeon(s) and Role:     * Rebekah Duran MD - Primary     * Akash Plata MD - Assisting    Preop Diagnosis:  AME III (vulvar intraepithelial neoplasia III) [D07 1]    Post-Op Diagnosis Codes:     * AME III (vulvar intraepithelial neoplasia III) [D07 1]    Procedure(s) (LRB):  VULVECTOMY SIMPLE   Partial (N/A)    Specimen(s):  ID Type Source Tests Collected by Time Destination   1 :  Tissue Vulva TISSUE EXAM Rebekah Duran MD 9/10/2021 0754        Estimated Blood Loss:   100 mL    Drains:  * No LDAs found *    Anesthesia Type:   General    Operative Indications:  AME III (vulvar intraepithelial neoplasia III) [D07 1]      Operative Findings:  Aceto-white epithelium in the posterior fourchette extending to approximately 4-8 o'clock on the bilateral introitus  No evidence of invasive disease  Complications:   None    Procedure and Technique:    Patient was placed in dorsal lithotomy position and prepped and draped in the usual sterile fashion  A time-out procedure was performed identifying the patient as self  The area of abnormality was identified and a sterile marker was used to chela the incision lines approximately 1 cm beyond each of the visible sites  A single excision was chosen in the posterior fourchette extending to the bilateral labia   The area was infiltrated with 0 5% Marcaine plain  Skin incision was created with a knife and carried in the subdermal region so the entirety of the skin was removed in a single elliptical fashion  Areas of bleeding were cauterized with electrocautery  The skin incision was closed primarily with advancement of the vagina over the perineal body  Interrupted horizontal mattress sutures of 2-0 Vicryl were used  The skin incision came together in a cosmetic fashion  No further bleeding sites were noted      The patient tolerated procedure without complication  Sponge and instrument counts were correct prior to closure  Hemostasis was assured prior to closure     I was present for the entire procedure    Patient Disposition:  PACU     SIGNATURE: Fide Shin MD  DATE: September 10, 2021  TIME: 8:45 AM

## 2021-09-10 NOTE — ANESTHESIA PREPROCEDURE EVALUATION
Procedure:  VULVECTOMY SIMPLE   Partial (N/A Vulva)    Relevant Problems   ANESTHESIA   (+) PONV (postoperative nausea and vomiting)      CARDIO   (+) Essential hypertension   (+) Hyperlipidemia      NEURO/PSYCH   (+) Anxiety   (+) Depression   (+) Headache   (+) Visual disorder      Other   (+) Glaucoma   (+) Morbid obesity with BMI of 40 0-44 9, adult [facility])        Physical Exam    Airway    Mallampati score: III  TM Distance: >3 FB  Neck ROM: full     Dental   No notable dental hx     Cardiovascular  Rhythm: regular, Rate: normal, Cardiovascular exam normal    Pulmonary  Pulmonary exam normal Breath sounds clear to auscultation,     Other Findings        Anesthesia Plan  ASA Score- 3     Anesthesia Type- general with ASA Monitors  Additional Monitors:   Airway Plan:           Plan Factors-    Chart reviewed  EKG reviewed  Existing labs reviewed  Patient summary reviewed  Patient is not a current smoker  Induction- intravenous  Postoperative Plan-     Informed Consent- Anesthetic plan and risks discussed with patient

## 2021-09-15 ENCOUNTER — TELEPHONE (OUTPATIENT)
Dept: HEMATOLOGY ONCOLOGY | Facility: CLINIC | Age: 56
End: 2021-09-15

## 2021-09-15 NOTE — TELEPHONE ENCOUNTER
Patient had surgery with Dr Kwong on 9/10/21  Patients is still bleeding, she states she had to strain when having a bowel movement and thinks she may have broken a stitch  She states it is not bleeding heavily but enough that she has to change the pad a few times a day   Best contact # [phone]

## 2021-09-20 ENCOUNTER — OFFICE VISIT (OUTPATIENT)
Dept: GYNECOLOGIC ONCOLOGY | Facility: CLINIC | Age: 56
End: 2021-09-20

## 2021-09-20 VITALS
RESPIRATION RATE: 18 BRPM | WEIGHT: 245.2 LBS | DIASTOLIC BLOOD PRESSURE: 82 MMHG | TEMPERATURE: 98 F | HEIGHT: 64 IN | OXYGEN SATURATION: 96 % | HEART RATE: 72 BPM | BODY MASS INDEX: 41.86 KG/M2 | SYSTOLIC BLOOD PRESSURE: 136 MMHG

## 2021-09-20 DIAGNOSIS — D07.1 VIN III (VULVAR INTRAEPITHELIAL NEOPLASIA III): Primary | ICD-10-CM

## 2021-09-20 PROCEDURE — 99024 POSTOP FOLLOW-UP VISIT: CPT | Performed by: OBSTETRICS & GYNECOLOGY

## 2021-09-20 RX ORDER — AMLODIPINE BESYLATE 2.5 MG/1
TABLET ORAL
COMMUNITY
Start: 2021-09-14

## 2021-09-20 NOTE — ASSESSMENT & PLAN NOTE
Patient has a history of AME 3 status post vulvectomy  The pathologist were unable to call a positive margin as to no ink  The specimen was handed to them not intact  I believe the margins themselves were negative and the area that they are calling as a possible positive margin are where the specimen was not intact  Patient is healing well  She has had superficial wound separation  She will likely be delayed going back to work  We will see her back in 2 weeks to decide return to work schedule  In the interim we have asked her to do Sitz baths or irrigations  She does have a score ball at home  With regard to pain control we have recommended starting nonsteroidal pain medicines as the opioids and Tylenol were not particularly effective

## 2021-09-20 NOTE — PROGRESS NOTES
Assessment/Plan:    Problem List Items Addressed This Visit        Genitourinary    AME III (vulvar intraepithelial neoplasia III) - Primary       Patient has a history of AME 3 status post vulvectomy  The pathologist were unable to call a positive margin as to no ink  The specimen was handed to them not intact  I believe the margins themselves were negative and the area that they are calling as a possible positive margin are where the specimen was not intact  Patient is healing well  She has had superficial wound separation  She will likely be delayed going back to work  We will see her back in 2 weeks to decide return to work schedule  In the interim we have asked her to do Sitz baths or irrigations  She does have a score ball at home  With regard to pain control we have recommended starting nonsteroidal pain medicines as the opioids and Tylenol were not particularly effective  CHIEF COMPLAINT:   Postoperative evaluation AME 3      Patient ID: Jenniffer Wooten is a 50 y o  female    Patient is very pleasant 61-year-old female with a history of AME 3  She recently underwent simple partial vulvectomy  Surgery was uncomplicated  Final pathology report reveals the following:    A  Vulva lesion, simple partial vulvectomy:  - High-grade squamous intraepithelial lesion/vulvar intraepithelial neoplasia grade 3 of     usual type/carcinoma in-situ (HSIL/uVIN 3/CIS) with adnexal extension, involving examined    tissue edges  See Note  -- Focally confirmed by positive p16/Ki-67 multiplex immunostain  -- Changes suggestive of underlying condyloma  -- Focal ulceration with inflamed granulation tissue   - No invasive carcinoma identified     - Concurrent lichen sclerosus  - Focal basilar hyperpigmentation with probable increased junctional melanocytes and     melanin incontinence  See Note  -- SOX10 and Mart-1 immunostains pending; results will be reported in an addendum  Electronically signed by Jason Dias MD on 9/15/2021 at  8:23 PM  Note   Although HSIL extends to examined peripheral tissue edges, minimal to no ink is present and sectioning artifact with loss of the true margin cannot be excluded  Multiple levels are examined  No invasive carcinoma is identified       Lentigo simplex/genital melanotic macule is favored for the histologic findings of basilar hyperpigmentation with  probable increased junctional melanocytes and melanin incontinence, but to completely exclude an atypical melanocytic proliferation, SOX10 and Mart-1 immunostains are pending  The result will be reported in an addendum  the patient did feel some pain and bleeding after bowel movement  She is using opioids was and Tylenol with minimal benefit  She has not used NSAIDs  She is not doing Sitz baths  The following portions of the patient's history were reviewed and updated as appropriate: allergies, current medications, past family history, past social history, past surgical history and problem list     Review of Systems   Constitutional: Negative  HENT: Negative  Eyes: Negative  Respiratory: Negative  Cardiovascular: Negative  Gastrointestinal: Negative  Endocrine: Negative  Genitourinary: Positive for vaginal pain  Musculoskeletal: Negative  Skin: Negative  Neurological: Negative  Hematological: Negative  Psychiatric/Behavioral: Negative          Current Outpatient Medications   Medication Sig Dispense Refill    amLODIPine (NORVASC) 5 mg tablet Take 10 mg by mouth daily      Ascorbic Acid (vitamin C) 100 MG tablet Take 120 mg by mouth daily      aspirin-acetaminophen-caffeine (EXCEDRIN MIGRAINE) 250-250-65 MG per tablet Take 1 tablet by mouth every 6 (six) hours as needed for headaches      cholecalciferol (VITAMIN D3) 1,000 units tablet Take 1,000 Units by mouth daily 2,000      clotrimazole-betamethasone (LOTRISONE) 1-0 05 % cream Apply topically 2 (two) times a day for 7 days 30 g 0    Flaxseed, Linseed, (Flaxseed Oil) 1000 MG CAPS Take by mouth      lovastatin (MEVACOR) 40 MG tablet Take 40 mg by mouth daily       Melatonin 10 MG TABS Take by mouth daily at bedtime       NON FORMULARY 2 capsules as needed Herbal laxative      NON FORMULARY Vision Essentials supplement       Nutritional Supplements (GRAPESEED EXTRACT PO) Take by mouth      Omega-3 Fatty Acids (Fish Oil) 1200 MG CPDR Take by mouth      oxyCODONE (ROXICODONE) 5 mg immediate release tablet 1 -2 tabs by mouth every 4-6 hour as needed 30 tablet 0    PreviDent 5000 Dry Mouth 1 1 % GEL BRUSH ON TEETH TWICE A DAY MORNING AND NIGHT      QUEtiapine (SEROquel) 400 MG tablet Take 400 mg by mouth daily at bedtime       SUMAtriptan (IMITREX) 100 mg tablet 100 mg once as needed for migraine       venlafaxine (EFFEXOR-XR) 150 mg 24 hr capsule Take 150 mg by mouth daily      zaleplon (SONATA) 10 MG capsule Take 10 mg by mouth daily at bedtime As needed      amLODIPine (NORVASC) 2 5 mg tablet TAKE 1 TABLET BY MOUTH DAILY WITH 5MG TABLET       No current facility-administered medications for this visit  Objective:    Blood pressure 136/82, pulse 72, temperature 98 °F (36 7 °C), temperature source Temporal, resp  rate 18, height 5' 4" (1 626 m), weight 111 kg (245 lb 3 2 oz), SpO2 96 %, not currently breastfeeding  Body mass index is 42 09 kg/m²  Body surface area is 2 13 meters squared  Physical Exam  Constitutional:       Appearance: She is well-developed  HENT:      Head: Normocephalic and atraumatic  Eyes:      Pupils: Pupils are equal, round, and reactive to light  Cardiovascular:      Rate and Rhythm: Normal rate and regular rhythm  Heart sounds: Normal heart sounds  Pulmonary:      Effort: Pulmonary effort is normal  No respiratory distress  Breath sounds: Normal breath sounds  Abdominal:      General: Bowel sounds are normal  There is no distension  Palpations: Abdomen is soft  Abdomen is not rigid  Tenderness: There is no abdominal tenderness  There is no guarding or rebound  Genitourinary:     Comments: External female genitalia with superficial wound separation of the perineum  It is granulating well  No evidence of active bleeding or infection are noted  Musculoskeletal:         General: Normal range of motion  Cervical back: Normal range of motion and neck supple  Lymphadenopathy:      Cervical: No cervical adenopathy  Upper Body:      Right upper body: No supraclavicular adenopathy  Left upper body: No supraclavicular adenopathy  Skin:     General: Skin is warm and dry  Neurological:      Mental Status: She is alert and oriented to person, place, and time     Psychiatric:         Behavior: Behavior normal          No results found for:   Lab Results   Component Value Date    K 4 1 08/31/2021     08/31/2021    CO2 29 08/31/2021    BUN 12 08/31/2021    CREATININE 0 64 08/31/2021    GLUF 106 (H) 08/31/2021    CALCIUM 9 3 08/31/2021    AST 28 08/31/2021    ALT 61 08/31/2021    ALKPHOS 57 08/31/2021    EGFR 102 08/31/2021     Lab Results   Component Value Date    WBC 8 63 08/31/2021    HGB 14 8 08/31/2021    HCT 43 5 08/31/2021    MCV 92 08/31/2021     08/31/2021     Lab Results   Component Value Date    NEUTROABS 4 76 08/31/2021        Trend:  No results found for:

## 2021-09-20 NOTE — LETTER
September 20, 2021     [phone]    Patient: Mason Mobley   YOB: 1965   Date of Visit: 9/20/2021       Dear Dr Enoc Casey: Thank you for referring Jesus Hicks to me for evaluation  Below are my notes for this consultation  If you have questions, please do not hesitate to call me  I look forward to following your patient along with you  Sincerely,        Nathan Fagan MD        CC: Corey Lopez MD  9/20/2021 10:10 AM  Sign when Signing Visit  Assessment/Plan:    Problem List Items Addressed This Visit        Genitourinary    AME III (vulvar intraepithelial neoplasia III) - Primary       Patient has a history of AME 3 status post vulvectomy  The pathologist were unable to call a positive margin as to no ink  The specimen was handed to them not intact  I believe the margins themselves were negative and the area that they are calling as a possible positive margin are where the specimen was not intact  Patient is healing well  She has had superficial wound separation  She will likely be delayed going back to work  We will see her back in 2 weeks to decide return to work schedule  In the interim we have asked her to do Sitz baths or irrigations  She does have a score ball at home  With regard to pain control we have recommended starting nonsteroidal pain medicines as the opioids and Tylenol were not particularly effective  CHIEF COMPLAINT:   Postoperative evaluation AME 3      Patient ID: Mason Mobley is a 50 y o  female    Patient is very pleasant 61-year-old female with a history of AME 3  She recently underwent simple partial vulvectomy  Surgery was uncomplicated  Final pathology report reveals the following:    A  Vulva lesion, simple partial vulvectomy:  - High-grade squamous intraepithelial lesion/vulvar intraepithelial neoplasia grade 3 of     usual type/carcinoma in-situ (HSIL/uVIN 3/CIS) with adnexal extension, involving examined    tissue edges  See Note  -- Focally confirmed by positive p16/Ki-67 multiplex immunostain  -- Changes suggestive of underlying condyloma  -- Focal ulceration with inflamed granulation tissue   - No invasive carcinoma identified     - Concurrent lichen sclerosus  - Focal basilar hyperpigmentation with probable increased junctional melanocytes and     melanin incontinence  See Note  -- SOX10 and Mart-1 immunostains pending; results will be reported in an addendum  Electronically signed by Mitzi Lazaro MD on 9/15/2021 at  8:23 PM  Note   Although HSIL extends to examined peripheral tissue edges, minimal to no ink is present and sectioning artifact with loss of the true margin cannot be excluded  Multiple levels are examined  No invasive carcinoma is identified       Lentigo simplex/genital melanotic macule is favored for the histologic findings of basilar hyperpigmentation with  probable increased junctional melanocytes and melanin incontinence, but to completely exclude an atypical melanocytic proliferation, SOX10 and Mart-1 immunostains are pending  The result will be reported in an addendum  the patient did feel some pain and bleeding after bowel movement  She is using opioids was and Tylenol with minimal benefit  She has not used NSAIDs  She is not doing Sitz baths  The following portions of the patient's history were reviewed and updated as appropriate: allergies, current medications, past family history, past social history, past surgical history and problem list     Review of Systems   Constitutional: Negative  HENT: Negative  Eyes: Negative  Respiratory: Negative  Cardiovascular: Negative  Gastrointestinal: Negative  Endocrine: Negative  Genitourinary: Positive for vaginal pain  Musculoskeletal: Negative  Skin: Negative  Neurological: Negative  Hematological: Negative  Psychiatric/Behavioral: Negative  Current Outpatient Medications   Medication Sig Dispense Refill    amLODIPine (NORVASC) 5 mg tablet Take 10 mg by mouth daily      Ascorbic Acid (vitamin C) 100 MG tablet Take 120 mg by mouth daily      aspirin-acetaminophen-caffeine (EXCEDRIN MIGRAINE) 250-250-65 MG per tablet Take 1 tablet by mouth every 6 (six) hours as needed for headaches      cholecalciferol (VITAMIN D3) 1,000 units tablet Take 1,000 Units by mouth daily 2,000      clotrimazole-betamethasone (LOTRISONE) 1-0 05 % cream Apply topically 2 (two) times a day for 7 days 30 g 0    Flaxseed, Linseed, (Flaxseed Oil) 1000 MG CAPS Take by mouth      lovastatin (MEVACOR) 40 MG tablet Take 40 mg by mouth daily       Melatonin 10 MG TABS Take by mouth daily at bedtime       NON FORMULARY 2 capsules as needed Herbal laxative      NON FORMULARY Vision Essentials supplement       Nutritional Supplements (GRAPESEED EXTRACT PO) Take by mouth      Omega-3 Fatty Acids (Fish Oil) 1200 MG CPDR Take by mouth      oxyCODONE (ROXICODONE) 5 mg immediate release tablet 1 -2 tabs by mouth every 4-6 hour as needed 30 tablet 0    PreviDent 5000 Dry Mouth 1 1 % GEL BRUSH ON TEETH TWICE A DAY MORNING AND NIGHT      QUEtiapine (SEROquel) 400 MG tablet Take 400 mg by mouth daily at bedtime       SUMAtriptan (IMITREX) 100 mg tablet 100 mg once as needed for migraine       venlafaxine (EFFEXOR-XR) 150 mg 24 hr capsule Take 150 mg by mouth daily      zaleplon (SONATA) 10 MG capsule Take 10 mg by mouth daily at bedtime As needed      amLODIPine (NORVASC) 2 5 mg tablet TAKE 1 TABLET BY MOUTH DAILY WITH 5MG TABLET       No current facility-administered medications for this visit  Objective:    Blood pressure 136/82, pulse 72, temperature 98 °F (36 7 °C), temperature source Temporal, resp  rate 18, height 5' 4" (1 626 m), weight 111 kg (245 lb 3 2 oz), SpO2 96 %, not currently breastfeeding    Body mass index is 42 09 kg/m²  Body surface area is 2 13 meters squared  Physical Exam  Constitutional:       Appearance: She is well-developed  HENT:      Head: Normocephalic and atraumatic  Eyes:      Pupils: Pupils are equal, round, and reactive to light  Cardiovascular:      Rate and Rhythm: Normal rate and regular rhythm  Heart sounds: Normal heart sounds  Pulmonary:      Effort: Pulmonary effort is normal  No respiratory distress  Breath sounds: Normal breath sounds  Abdominal:      General: Bowel sounds are normal  There is no distension  Palpations: Abdomen is soft  Abdomen is not rigid  Tenderness: There is no abdominal tenderness  There is no guarding or rebound  Genitourinary:     Comments: External female genitalia with superficial wound separation of the perineum  It is granulating well  No evidence of active bleeding or infection are noted  Musculoskeletal:         General: Normal range of motion  Cervical back: Normal range of motion and neck supple  Lymphadenopathy:      Cervical: No cervical adenopathy  Upper Body:      Right upper body: No supraclavicular adenopathy  Left upper body: No supraclavicular adenopathy  Skin:     General: Skin is warm and dry  Neurological:      Mental Status: She is alert and oriented to person, place, and time     Psychiatric:         Behavior: Behavior normal          No results found for:   Lab Results   Component Value Date    K 4 1 08/31/2021     08/31/2021    CO2 29 08/31/2021    BUN 12 08/31/2021    CREATININE 0 64 08/31/2021    GLUF 106 (H) 08/31/2021    CALCIUM 9 3 08/31/2021    AST 28 08/31/2021    ALT 61 08/31/2021    ALKPHOS 57 08/31/2021    EGFR 102 08/31/2021     Lab Results   Component Value Date    WBC 8 63 08/31/2021    HGB 14 8 08/31/2021    HCT 43 5 08/31/2021    MCV 92 08/31/2021     08/31/2021     Lab Results   Component Value Date    NEUTROABS 4 76 08/31/2021        Trend:  No results found for:

## 2021-10-04 ENCOUNTER — OFFICE VISIT (OUTPATIENT)
Dept: GYNECOLOGIC ONCOLOGY | Facility: CLINIC | Age: 56
End: 2021-10-04

## 2021-10-04 ENCOUNTER — TELEPHONE (OUTPATIENT)
Dept: OBGYN CLINIC | Facility: CLINIC | Age: 56
End: 2021-10-04

## 2021-10-04 VITALS
OXYGEN SATURATION: 96 % | DIASTOLIC BLOOD PRESSURE: 70 MMHG | TEMPERATURE: 97.1 F | WEIGHT: 244 LBS | HEART RATE: 78 BPM | HEIGHT: 64 IN | BODY MASS INDEX: 41.66 KG/M2 | RESPIRATION RATE: 16 BRPM | SYSTOLIC BLOOD PRESSURE: 140 MMHG

## 2021-10-04 DIAGNOSIS — D07.1 VIN III (VULVAR INTRAEPITHELIAL NEOPLASIA III): Primary | ICD-10-CM

## 2021-10-04 PROCEDURE — 99024 POSTOP FOLLOW-UP VISIT: CPT | Performed by: OBSTETRICS & GYNECOLOGY

## 2021-10-05 ENCOUNTER — TELEPHONE (OUTPATIENT)
Dept: OBGYN CLINIC | Facility: CLINIC | Age: 56
End: 2021-10-05

## 2021-11-03 ENCOUNTER — TELEPHONE (OUTPATIENT)
Dept: GYNECOLOGIC ONCOLOGY | Facility: CLINIC | Age: 56
End: 2021-11-03

## 2021-11-15 ENCOUNTER — OFFICE VISIT (OUTPATIENT)
Dept: GYNECOLOGIC ONCOLOGY | Facility: CLINIC | Age: 56
End: 2021-11-15

## 2021-11-15 VITALS
WEIGHT: 250 LBS | DIASTOLIC BLOOD PRESSURE: 80 MMHG | TEMPERATURE: 96.9 F | SYSTOLIC BLOOD PRESSURE: 140 MMHG | BODY MASS INDEX: 42.68 KG/M2 | RESPIRATION RATE: 16 BRPM | HEIGHT: 64 IN | HEART RATE: 77 BPM

## 2021-11-15 DIAGNOSIS — D07.1 VIN III (VULVAR INTRAEPITHELIAL NEOPLASIA III): Primary | ICD-10-CM

## 2021-11-15 PROCEDURE — 99024 POSTOP FOLLOW-UP VISIT: CPT | Performed by: OBSTETRICS & GYNECOLOGY

## 2021-11-22 ENCOUNTER — TELEPHONE (OUTPATIENT)
Dept: HEMATOLOGY ONCOLOGY | Facility: CLINIC | Age: 56
End: 2021-11-22

## 2021-11-26 ENCOUNTER — ANNUAL EXAM (OUTPATIENT)
Dept: OBGYN CLINIC | Facility: CLINIC | Age: 56
End: 2021-11-26
Payer: COMMERCIAL

## 2021-11-26 VITALS
DIASTOLIC BLOOD PRESSURE: 82 MMHG | HEIGHT: 65 IN | BODY MASS INDEX: 42.12 KG/M2 | WEIGHT: 252.8 LBS | SYSTOLIC BLOOD PRESSURE: 128 MMHG

## 2021-11-26 DIAGNOSIS — Z12.4 CERVICAL CANCER SCREENING: Primary | ICD-10-CM

## 2021-11-26 DIAGNOSIS — Z11.51 SCREENING FOR HPV (HUMAN PAPILLOMAVIRUS): ICD-10-CM

## 2021-11-26 DIAGNOSIS — Z12.31 ENCOUNTER FOR SCREENING MAMMOGRAM FOR BREAST CANCER: ICD-10-CM

## 2021-11-26 DIAGNOSIS — Z01.419 ENCOUNTER FOR ANNUAL ROUTINE GYNECOLOGICAL EXAMINATION: ICD-10-CM

## 2021-11-26 DIAGNOSIS — N89.8 VAGINAL DISCHARGE: ICD-10-CM

## 2021-11-26 PROCEDURE — 87210 SMEAR WET MOUNT SALINE/INK: CPT | Performed by: OBSTETRICS & GYNECOLOGY

## 2021-11-26 PROCEDURE — 99396 PREV VISIT EST AGE 40-64: CPT | Performed by: OBSTETRICS & GYNECOLOGY

## 2021-11-26 PROCEDURE — G0145 SCR C/V CYTO,THINLAYER,RESCR: HCPCS | Performed by: PATHOLOGY

## 2021-11-26 PROCEDURE — G0124 SCREEN C/V THIN LAYER BY MD: HCPCS | Performed by: PATHOLOGY

## 2021-11-26 PROCEDURE — G0476 HPV COMBO ASSAY CA SCREEN: HCPCS | Performed by: OBSTETRICS & GYNECOLOGY

## 2021-12-02 LAB
LAB AP GYN PRIMARY INTERPRETATION: ABNORMAL
Lab: ABNORMAL
PATH INTERP SPEC-IMP: ABNORMAL

## 2022-01-07 ENCOUNTER — OFFICE VISIT (OUTPATIENT)
Dept: OBGYN CLINIC | Facility: CLINIC | Age: 57
End: 2022-01-07
Payer: COMMERCIAL

## 2022-01-07 VITALS — WEIGHT: 263.6 LBS | DIASTOLIC BLOOD PRESSURE: 90 MMHG | BODY MASS INDEX: 44.55 KG/M2 | SYSTOLIC BLOOD PRESSURE: 130 MMHG

## 2022-01-07 DIAGNOSIS — R87.619 ATYPICAL GLANDULAR CELLS ON CERVICAL PAP SMEAR: ICD-10-CM

## 2022-01-07 DIAGNOSIS — N89.5 VAGINAL STENOSIS: ICD-10-CM

## 2022-01-07 DIAGNOSIS — B97.7 HPV (HUMAN PAPILLOMA VIRUS) INFECTION: Primary | ICD-10-CM

## 2022-01-07 PROCEDURE — 99213 OFFICE O/P EST LOW 20 MIN: CPT | Performed by: OBSTETRICS & GYNECOLOGY

## 2022-01-07 NOTE — PROGRESS NOTES
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, positive HPV 16-I reviewed this with her in detail  She was given a copy of the Pap at her request as she was having difficulty seeing it on My Chart  I explained that in addition to a colposcopy, she also needs an endometrial biopsy  Since she has had persistent positive HPV, I recommended also doing a LEEP at the time of the colposcopy and endometrial biopsy  Typically we do the colposcopy 1st and await results however she will not be able to tolerate these procedures in the office and if we do the LEEP at the same time she could be biopsied and treated simultaneously  She is agreeable  I reviewed risks of bleeding and infection, injury to surrounding structure such as vaginal tissue  I reviewed the consent with her for exam under anesthesia, colposcopy, endometrial biopsy and possible LEEP  The LEEP will only be done if I can gain adequate visualization  I explained that we will have to be careful with her recent vulvectomy site, she has significant narrowing of her introitus after this  The consent was signed and she will schedule  She will use some Vaseline on the vulvectomy site when she feels itching  We can examine this at the time of her procedure as well  There are no diagnoses linked to this encounter  Subjective:     Patient ID: Jhonatan De Leon is a 49 y o  female  Patient here to discuss exam under anesthesia, colposcopy and endometrial biopsy  She has a long history of abnormal Paps  She has had a LEEP in the past   She also recently had vulvar dysplasia and had a vulvectomy  When she return for her yearly exam and her Pap was abnormal, we determined that she would be unable to undergo colposcopy and endometrial biopsy in the office due to narrowing of the introitus and slow healing of her vulvectomy  Her Pap showed atypical glandular cells of undetermined significance with positive HPV 16   Has had persistent HPV 16  She has some granulation tissue at the site of her vulvectomy  She states that at times this can be itchy  It is mild  She would like to use something such as Vaseline on the tissue  Review of Systems   Constitutional: Negative  Gastrointestinal: Negative  Genitourinary: Negative  Objective:     Physical Exam  Constitutional:       Appearance: Normal appearance  Cardiovascular:      Rate and Rhythm: Normal rate and regular rhythm  Pulmonary:      Effort: Pulmonary effort is normal       Breath sounds: Normal breath sounds  Neurological:      Mental Status: She is alert

## 2022-01-07 NOTE — H&P (VIEW-ONLY)
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, positive HPV 16-I reviewed this with her in detail  She was given a copy of the Pap at her request as she was having difficulty seeing it on My Chart  I explained that in addition to a colposcopy, she also needs an endometrial biopsy  Since she has had persistent positive HPV, I recommended also doing a LEEP at the time of the colposcopy and endometrial biopsy  Typically we do the colposcopy 1st and await results however she will not be able to tolerate these procedures in the office and if we do the LEEP at the same time she could be biopsied and treated simultaneously  She is agreeable  I reviewed risks of bleeding and infection, injury to surrounding structure such as vaginal tissue  I reviewed the consent with her for exam under anesthesia, colposcopy, endometrial biopsy and possible LEEP  The LEEP will only be done if I can gain adequate visualization  I explained that we will have to be careful with her recent vulvectomy site, she has significant narrowing of her introitus after this  The consent was signed and she will schedule  She will use some Vaseline on the vulvectomy site when she feels itching  We can examine this at the time of her procedure as well  There are no diagnoses linked to this encounter  Subjective:     Patient ID: Anju Andino is a 49 y o  female  Patient here to discuss exam under anesthesia, colposcopy and endometrial biopsy  She has a long history of abnormal Paps  She has had a LEEP in the past   She also recently had vulvar dysplasia and had a vulvectomy  When she return for her yearly exam and her Pap was abnormal, we determined that she would be unable to undergo colposcopy and endometrial biopsy in the office due to narrowing of the introitus and slow healing of her vulvectomy  Her Pap showed atypical glandular cells of undetermined significance with positive HPV 16   Has had persistent HPV 16  She has some granulation tissue at the site of her vulvectomy  She states that at times this can be itchy  It is mild  She would like to use something such as Vaseline on the tissue  Review of Systems   Constitutional: Negative  Gastrointestinal: Negative  Genitourinary: Negative  Objective:     Physical Exam  Constitutional:       Appearance: Normal appearance  Cardiovascular:      Rate and Rhythm: Normal rate and regular rhythm  Pulmonary:      Effort: Pulmonary effort is normal       Breath sounds: Normal breath sounds  Neurological:      Mental Status: She is alert

## 2022-01-24 ENCOUNTER — APPOINTMENT (OUTPATIENT)
Dept: PREADMISSION TESTING | Facility: HOSPITAL | Age: 57
End: 2022-01-24
Payer: COMMERCIAL

## 2022-01-25 ENCOUNTER — ANESTHESIA EVENT (OUTPATIENT)
Dept: PERIOP | Facility: HOSPITAL | Age: 57
End: 2022-01-25
Payer: COMMERCIAL

## 2022-01-25 NOTE — PRE-PROCEDURE INSTRUCTIONS
Pre-Surgery Instructions:   Medication Instructions    amLODIPine (NORVASC) 2 5 mg tablet Instructed patient per Anesthesia Guidelines   amLODIPine (NORVASC) 5 mg tablet Instructed patient per Anesthesia Guidelines   aspirin-acetaminophen-caffeine (EXCEDRIN MIGRAINE) 565-279-01 MG per tablet Instructed patient per Anesthesia Guidelines   cholecalciferol (VITAMIN D3) 1,000 units tablet Instructed patient per Anesthesia Guidelines   Flaxseed, Linseed, (Flaxseed Oil) 1000 MG CAPS Instructed patient per Anesthesia Guidelines   lovastatin (MEVACOR) 40 MG tablet Instructed patient per Anesthesia Guidelines   MAGNESIUM-ZINC PO Instructed patient per Anesthesia Guidelines   Melatonin 10 MG TABS Instructed patient per Anesthesia Guidelines   NON FORMULARY Instructed patient per Anesthesia Guidelines   NON FORMULARY Instructed patient per Anesthesia Guidelines   Nutritional Supplements (GRAPESEED EXTRACT PO) Instructed patient per Anesthesia Guidelines   Omega-3 Fatty Acids (Fish Oil) 1200 MG CPDR Instructed patient per Anesthesia Guidelines   PreviDent 5000 Dry Mouth 1 1 % GEL Instructed patient per Anesthesia Guidelines   QUEtiapine (SEROquel) 400 MG tablet Instructed patient per Anesthesia Guidelines   SUMAtriptan (IMITREX) 100 mg tablet Instructed patient per Anesthesia Guidelines   venlafaxine (EFFEXOR-XR) 150 mg 24 hr capsule Instructed patient per Anesthesia Guidelines   zaleplon (SONATA) 10 MG capsule Instructed patient per Anesthesia Guidelines  Patient  instructed *to take*norvasc and effexor*with a sip of water the morning of surgery  Patient instructed on use of chlorhexidine soap per hospital protocol    Patient instructed to stop all ASA, NSAIDS( 3 days), vitamins and herbal supplements one week prior to surgery reinforced several times or per Dr Arturo Mathias

## 2022-01-27 ENCOUNTER — APPOINTMENT (OUTPATIENT)
Dept: LAB | Facility: MEDICAL CENTER | Age: 57
End: 2022-01-27
Payer: COMMERCIAL

## 2022-01-27 DIAGNOSIS — Z01.818 PRE-OP TESTING: ICD-10-CM

## 2022-01-27 DIAGNOSIS — E78.2 MIXED HYPERLIPIDEMIA: ICD-10-CM

## 2022-01-27 DIAGNOSIS — I51.9 MYXEDEMA HEART DISEASE: ICD-10-CM

## 2022-01-27 DIAGNOSIS — E03.9 MYXEDEMA HEART DISEASE: ICD-10-CM

## 2022-01-27 DIAGNOSIS — I10 HYPERTENSION, ESSENTIAL: ICD-10-CM

## 2022-01-27 DIAGNOSIS — G43.009 MIGRAINE WITHOUT AURA AND WITHOUT STATUS MIGRAINOSUS, NOT INTRACTABLE: ICD-10-CM

## 2022-01-27 DIAGNOSIS — R73.01 IMPAIRED FASTING GLUCOSE: ICD-10-CM

## 2022-01-27 DIAGNOSIS — E55.9 VITAMIN D DEFICIENCY: ICD-10-CM

## 2022-01-27 LAB
25(OH)D3 SERPL-MCNC: 32.6 NG/ML (ref 30–100)
ALBUMIN SERPL BCP-MCNC: 3.9 G/DL (ref 3.5–5)
ALP SERPL-CCNC: 51 U/L (ref 46–116)
ALT SERPL W P-5'-P-CCNC: 55 U/L (ref 12–78)
ANION GAP SERPL CALCULATED.3IONS-SCNC: 3 MMOL/L (ref 4–13)
AST SERPL W P-5'-P-CCNC: 29 U/L (ref 5–45)
BASOPHILS # BLD AUTO: 0.04 THOUSANDS/ΜL (ref 0–0.1)
BASOPHILS NFR BLD AUTO: 1 % (ref 0–1)
BILIRUB SERPL-MCNC: 0.38 MG/DL (ref 0.2–1)
BUN SERPL-MCNC: 9 MG/DL (ref 5–25)
CALCIUM SERPL-MCNC: 9.2 MG/DL (ref 8.3–10.1)
CHLORIDE SERPL-SCNC: 107 MMOL/L (ref 100–108)
CHOLEST SERPL-MCNC: 173 MG/DL
CO2 SERPL-SCNC: 29 MMOL/L (ref 21–32)
CREAT SERPL-MCNC: 0.73 MG/DL (ref 0.6–1.3)
EOSINOPHIL # BLD AUTO: 0.21 THOUSAND/ΜL (ref 0–0.61)
EOSINOPHIL NFR BLD AUTO: 3 % (ref 0–6)
ERYTHROCYTE [DISTWIDTH] IN BLOOD BY AUTOMATED COUNT: 12.7 % (ref 11.6–15.1)
GFR SERPL CREATININE-BSD FRML MDRD: 93 ML/MIN/1.73SQ M
GLUCOSE P FAST SERPL-MCNC: 113 MG/DL (ref 65–99)
HCT VFR BLD AUTO: 43 % (ref 34.8–46.1)
HDLC SERPL-MCNC: 55 MG/DL
HGB BLD-MCNC: 14.2 G/DL (ref 11.5–15.4)
IMM GRANULOCYTES # BLD AUTO: 0.05 THOUSAND/UL (ref 0–0.2)
IMM GRANULOCYTES NFR BLD AUTO: 1 % (ref 0–2)
LDLC SERPL CALC-MCNC: 94 MG/DL (ref 0–100)
LYMPHOCYTES # BLD AUTO: 1.94 THOUSANDS/ΜL (ref 0.6–4.47)
LYMPHOCYTES NFR BLD AUTO: 26 % (ref 14–44)
MCH RBC QN AUTO: 30.1 PG (ref 26.8–34.3)
MCHC RBC AUTO-ENTMCNC: 33 G/DL (ref 31.4–37.4)
MCV RBC AUTO: 91 FL (ref 82–98)
MONOCYTES # BLD AUTO: 0.64 THOUSAND/ΜL (ref 0.17–1.22)
MONOCYTES NFR BLD AUTO: 9 % (ref 4–12)
NEUTROPHILS # BLD AUTO: 4.68 THOUSANDS/ΜL (ref 1.85–7.62)
NEUTS SEG NFR BLD AUTO: 60 % (ref 43–75)
NONHDLC SERPL-MCNC: 118 MG/DL
NRBC BLD AUTO-RTO: 0 /100 WBCS
PLATELET # BLD AUTO: 313 THOUSANDS/UL (ref 149–390)
PMV BLD AUTO: 9.9 FL (ref 8.9–12.7)
POTASSIUM SERPL-SCNC: 3.6 MMOL/L (ref 3.5–5.3)
PROT SERPL-MCNC: 7.2 G/DL (ref 6.4–8.2)
RBC # BLD AUTO: 4.71 MILLION/UL (ref 3.81–5.12)
SODIUM SERPL-SCNC: 139 MMOL/L (ref 136–145)
TRIGL SERPL-MCNC: 121 MG/DL
TSH SERPL DL<=0.05 MIU/L-ACNC: 2.52 UIU/ML (ref 0.36–3.74)
WBC # BLD AUTO: 7.56 THOUSAND/UL (ref 4.31–10.16)

## 2022-01-27 PROCEDURE — 83036 HEMOGLOBIN GLYCOSYLATED A1C: CPT

## 2022-01-27 PROCEDURE — 80053 COMPREHEN METABOLIC PANEL: CPT

## 2022-01-27 PROCEDURE — 85025 COMPLETE CBC W/AUTO DIFF WBC: CPT

## 2022-01-27 PROCEDURE — 84443 ASSAY THYROID STIM HORMONE: CPT

## 2022-01-27 PROCEDURE — 36415 COLL VENOUS BLD VENIPUNCTURE: CPT

## 2022-01-27 PROCEDURE — 80061 LIPID PANEL: CPT

## 2022-01-27 PROCEDURE — 82306 VITAMIN D 25 HYDROXY: CPT

## 2022-01-28 ENCOUNTER — TELEPHONE (OUTPATIENT)
Dept: OBGYN CLINIC | Facility: CLINIC | Age: 57
End: 2022-01-28

## 2022-01-28 LAB
EST. AVERAGE GLUCOSE BLD GHB EST-MCNC: 120 MG/DL
HBA1C MFR BLD: 5.8 %

## 2022-01-28 NOTE — TELEPHONE ENCOUNTER
[facility] 1/28/2022 at 10:38 am CPT Code 21552 was approved for surgery on 2/1/2022  CPT codes 67284,06631 did not need a prior authorization

## 2022-02-01 ENCOUNTER — ANESTHESIA (OUTPATIENT)
Dept: PERIOP | Facility: HOSPITAL | Age: 57
End: 2022-02-01
Payer: COMMERCIAL

## 2022-02-01 ENCOUNTER — HOSPITAL ENCOUNTER (OUTPATIENT)
Facility: HOSPITAL | Age: 57
Setting detail: OUTPATIENT SURGERY
Discharge: HOME | End: 2022-02-01
Attending: OBSTETRICS & GYNECOLOGY | Admitting: OBSTETRICS & GYNECOLOGY
Payer: COMMERCIAL

## 2022-02-01 VITALS
DIASTOLIC BLOOD PRESSURE: 89 MMHG | HEIGHT: 64 IN | SYSTOLIC BLOOD PRESSURE: 156 MMHG | BODY MASS INDEX: 44.11 KG/M2 | RESPIRATION RATE: 16 BRPM | HEART RATE: 89 BPM | OXYGEN SATURATION: 95 % | TEMPERATURE: 98.3 F | WEIGHT: 258.38 LBS

## 2022-02-01 DIAGNOSIS — N89.5 VAGINAL STENOSIS: ICD-10-CM

## 2022-02-01 DIAGNOSIS — R87.619 ABNORMAL CERVICAL PAPANICOLAOU SMEAR, UNSPECIFIED ABNORMAL PAP FINDING: ICD-10-CM

## 2022-02-01 LAB
EXT PREGNANCY TEST URINE: NEGATIVE
EXT. CONTROL: NORMAL

## 2022-02-01 PROCEDURE — 88307 TISSUE EXAM BY PATHOLOGIST: CPT | Performed by: PATHOLOGY

## 2022-02-01 PROCEDURE — 57461 CONZ OF CERVIX W/SCOPE LEEP: CPT | Performed by: OBSTETRICS & GYNECOLOGY

## 2022-02-01 PROCEDURE — 88305 TISSUE EXAM BY PATHOLOGIST: CPT | Performed by: PATHOLOGY

## 2022-02-01 PROCEDURE — 81025 URINE PREGNANCY TEST: CPT | Performed by: OBSTETRICS & GYNECOLOGY

## 2022-02-01 PROCEDURE — 58110 BX DONE W/COLPOSCOPY ADD-ON: CPT | Performed by: OBSTETRICS & GYNECOLOGY

## 2022-02-01 RX ORDER — MIDAZOLAM HYDROCHLORIDE 2 MG/2ML
INJECTION, SOLUTION INTRAMUSCULAR; INTRAVENOUS AS NEEDED
Status: DISCONTINUED | OUTPATIENT
Start: 2022-02-01 | End: 2022-02-01

## 2022-02-01 RX ORDER — LIDOCAINE HYDROCHLORIDE AND EPINEPHRINE 10; 10 MG/ML; UG/ML
INJECTION, SOLUTION INFILTRATION; PERINEURAL AS NEEDED
Status: DISCONTINUED | OUTPATIENT
Start: 2022-02-01 | End: 2022-02-01 | Stop reason: HOSPADM

## 2022-02-01 RX ORDER — ONDANSETRON 2 MG/ML
4 INJECTION INTRAMUSCULAR; INTRAVENOUS ONCE AS NEEDED
Status: DISCONTINUED | OUTPATIENT
Start: 2022-02-01 | End: 2022-02-01 | Stop reason: HOSPADM

## 2022-02-01 RX ORDER — LIDOCAINE HYDROCHLORIDE 20 MG/ML
INJECTION, SOLUTION EPIDURAL; INFILTRATION; INTRACAUDAL; PERINEURAL AS NEEDED
Status: DISCONTINUED | OUTPATIENT
Start: 2022-02-01 | End: 2022-02-01

## 2022-02-01 RX ORDER — ACETAMINOPHEN 325 MG/1
975 TABLET ORAL EVERY 6 HOURS PRN
Status: DISCONTINUED | OUTPATIENT
Start: 2022-02-01 | End: 2022-02-01 | Stop reason: HOSPADM

## 2022-02-01 RX ORDER — KETOROLAC TROMETHAMINE 30 MG/ML
INJECTION, SOLUTION INTRAMUSCULAR; INTRAVENOUS AS NEEDED
Status: DISCONTINUED | OUTPATIENT
Start: 2022-02-01 | End: 2022-02-01

## 2022-02-01 RX ORDER — ONDANSETRON 2 MG/ML
4 INJECTION INTRAMUSCULAR; INTRAVENOUS EVERY 6 HOURS PRN
Status: DISCONTINUED | OUTPATIENT
Start: 2022-02-01 | End: 2022-02-01 | Stop reason: HOSPADM

## 2022-02-01 RX ORDER — FENTANYL CITRATE/PF 50 MCG/ML
25 SYRINGE (ML) INJECTION
Status: DISCONTINUED | OUTPATIENT
Start: 2022-02-01 | End: 2022-02-01

## 2022-02-01 RX ORDER — HYDROMORPHONE HCL/PF 1 MG/ML
0.5 SYRINGE (ML) INJECTION
Status: DISCONTINUED | OUTPATIENT
Start: 2022-02-01 | End: 2022-02-01 | Stop reason: HOSPADM

## 2022-02-01 RX ORDER — SODIUM CHLORIDE 9 MG/ML
125 INJECTION, SOLUTION INTRAVENOUS CONTINUOUS
Status: DISCONTINUED | OUTPATIENT
Start: 2022-02-01 | End: 2022-02-01 | Stop reason: HOSPADM

## 2022-02-01 RX ORDER — DEXAMETHASONE SODIUM PHOSPHATE 4 MG/ML
INJECTION, SOLUTION INTRA-ARTICULAR; INTRALESIONAL; INTRAMUSCULAR; INTRAVENOUS; SOFT TISSUE AS NEEDED
Status: DISCONTINUED | OUTPATIENT
Start: 2022-02-01 | End: 2022-02-01

## 2022-02-01 RX ORDER — PROPOFOL 10 MG/ML
INJECTION, EMULSION INTRAVENOUS AS NEEDED
Status: DISCONTINUED | OUTPATIENT
Start: 2022-02-01 | End: 2022-02-01

## 2022-02-01 RX ORDER — ONDANSETRON 2 MG/ML
INJECTION INTRAMUSCULAR; INTRAVENOUS AS NEEDED
Status: DISCONTINUED | OUTPATIENT
Start: 2022-02-01 | End: 2022-02-01

## 2022-02-01 RX ORDER — IBUPROFEN 600 MG/1
600 TABLET ORAL EVERY 6 HOURS PRN
Status: DISCONTINUED | OUTPATIENT
Start: 2022-02-01 | End: 2022-02-01 | Stop reason: HOSPADM

## 2022-02-01 RX ADMIN — KETOROLAC TROMETHAMINE 30 MG: 30 INJECTION, SOLUTION INTRAMUSCULAR at 14:34

## 2022-02-01 RX ADMIN — MIDAZOLAM 2 MG: 1 INJECTION INTRAMUSCULAR; INTRAVENOUS at 13:24

## 2022-02-01 RX ADMIN — SODIUM CHLORIDE 125 ML/HR: 0.9 INJECTION, SOLUTION INTRAVENOUS at 11:35

## 2022-02-01 RX ADMIN — ACETAMINOPHEN 975 MG: 325 TABLET, FILM COATED ORAL at 16:02

## 2022-02-01 RX ADMIN — SODIUM CHLORIDE: 0.9 INJECTION, SOLUTION INTRAVENOUS at 14:15

## 2022-02-01 RX ADMIN — PROPOFOL 200 MG: 10 INJECTION, EMULSION INTRAVENOUS at 13:28

## 2022-02-01 RX ADMIN — ONDANSETRON 4 MG: 2 INJECTION INTRAMUSCULAR; INTRAVENOUS at 14:34

## 2022-02-01 RX ADMIN — DEXAMETHASONE SODIUM PHOSPHATE 4 MG: 4 INJECTION INTRA-ARTICULAR; INTRALESIONAL; INTRAMUSCULAR; INTRAVENOUS; SOFT TISSUE at 13:29

## 2022-02-01 RX ADMIN — LIDOCAINE HYDROCHLORIDE 100 MG: 20 INJECTION, SOLUTION EPIDURAL; INFILTRATION; INTRACAUDAL; PERINEURAL at 13:28

## 2022-02-01 NOTE — OP NOTE
PERATIVE REPORT  PATIENT NAME: Mark Anthony Woods    :  1965  MRN: [id number]  Pt Location: [facility] OR ROOM 03    SURGERY DATE: 2022    Surgeon(s) and Role:     * Lindsay Ohara DO - Primary     * Robert Rodrigues MD - Assisting    Preop Diagnosis:  Abnormal cervical Papanicolaou smear, unspecified abnormal pap finding [R87 619]  Vaginal stenosis [N89 5]    Post-Op Diagnosis Codes:     * Abnormal cervical Papanicolaou smear, unspecified abnormal pap finding [R87 619]     * Vaginal stenosis [N89 5]    Procedure(s) (LRB):  EXAM UNDER ANESTHESIA (EUA) (N/A)  COLPOSCOPY, ENDOMETRIAL BIOPSY;  LEEP (N/A)    Specimen(s):  ID Type Source Tests Collected by Time Destination   1 : ENDOMETRIAL BIOPSY  Tissue Cervix TISSUE EXAM Lindsay Ohara, DO 2022 1404    2 : ECC Tissue Soft Tissue, Other TISSUE EXAM Lindsay Ohara DO 2022 1405    3 : posterior cervix Tissue Cervix TISSUE EXAM Lindsay Ohara, DO 2022 1432    4 : ANTERIOR CERVIX Tissue Cervix TISSUE EXAM Lindsay Ohara, DO 2022 1433        Estimated Blood Loss:   10 mL    Drains:  * No LDAs found *    Anesthesia Type:   IV Sedation with Anesthesia  Paracervical block 14cc 1% lidocaine with lidocaine    Operative Indications:  Abnormal cervical Papanicolaou smear, unspecified abnormal pap finding [R87 619]  Vaginal stenosis [N89 5]      Operative Findings:  Very narrowed introitus secondary to recent vulvectomy  Granulation tissue on the right side of the perineum  Cervix appeared normal on colposcopy  Uterus sounded to 7 cm during endometrial biopsy    Complications:   None    Procedure and Technique:  Patient was taken to the Operating room and identified as Parisa Ramirez  She was placed under IV sedation  In the dorsal lithotomy position, compression boots were placed and she was sterilely prepped and draped  The bladder was emptied for 200 cc of clear urine  The urethra was initially difficult to visualize due to the narrowing the introitus    However the labia were  and the catheter was placed without difficulty  Initially only the tip of my finger could be admitted into the vagina however it was only the opening that was narrowed  There seemed to be some adherence of perineum right the 6 o'clock position  A small Roman speculum was placed in the vagina and a Dennys was placed anteriorly  The cervix could then be visualized  The cervix was grasped with an Allis and we started to place the cervical block however the cervix was grasped posteriorly so once we had better visualization, the Allis was then replaced anteriorly  The block was then completed without difficulty  An ECC was then done  This was followed by an endometrial biopsy using an ensula curette  The uterus sounded to 7 cm  There seemed to be an adequate specimen  At this time we decided to try inserting a larger speculum so that we could do the colposcopy and anticipating a possible LEEP procedure  A coated Graves speculum was then placed into the vagina without difficulty using some lubrication  Colposcopy was then done without difficulty  Ascetic acid solution was placed on the cervix and there was no sign of aceto-white or any lesions noted  We decided to do the LEEP procedure  Due to the limited space, the smallest loop was used and the specimen was taken in 2 separate pieces, and anterior and a posterior specimen  There was excellent hemostasis  There was a small amount of bleeding at 5-6 o'clock but this was controlled with pressure and Monsel's solution along with silver nitrate  At this time, the procedure was finished and all instruments were removed from the vagina  There was some superficial abrasion at the 5 and 7 o'clock positions just inside the introitus  This was from the speculum  All sponge needle and instrument counts were correct x2  The patient was awakened and taken to the recovery room in good condition     I was present for the entire procedure    Patient Disposition:  PACU       SIGNATURE: Cara Magallon DO  DATE: February 1, 2022  TIME: 3:00 PM

## 2022-02-01 NOTE — INTERVAL H&P NOTE
H&P reviewed  After examining the patient I find no changes in the patients condition since the H&P had been written  Reviewed the procedure with the patient  As long as the visualization is adequate, will plan to do a colposcopy, emb and LEEP  This will potentially treat any dysplasia  I reviewed the ANTONIO pap and the need for the emb and explained that typically we do the colposcopy first and then have her come back for the LEEP  Since we cannot do these in the office due to discomfort and narrowing of the introitus, we are opting to do both of these together today  She understands this  Her questions were answered      Vitals:    02/01/22 1114   BP: 148/68   Pulse: 83   Resp: 16   Temp: 99 2 °F (37 3 °C)   SpO2: 95%

## 2022-02-01 NOTE — ANESTHESIA PREPROCEDURE EVALUATION
Procedure:  EXAM UNDER ANESTHESIA (EUA) (N/A Vagina )  COLPOSCOPY, ENDOMETRIAL BIOPSY; POSSIBLE LEEP (N/A Vagina )    Relevant Problems   ANESTHESIA   (+) PONV (postoperative nausea and vomiting)      CARDIO   (+) Essential hypertension   (+) Hyperlipidemia   (+) Migraine      NEURO/PSYCH   (+) Anxiety   (+) Depression   (+) Headache   (+) Migraine   (+) Visual disorder      PULMONARY   (+) Sleep apnea      Other   (+) CPAP (continuous positive airway pressure) dependence   (+) Morbid obesity with BMI of 40 0-44 9, adult (HCC)        Physical Exam    Airway    Mallampati score: III  TM Distance: >3 FB  Neck ROM: full     Dental       Cardiovascular      Pulmonary  Breath sounds clear to auscultation,     Other Findings        Anesthesia Plan  ASA Score- 3     Anesthesia Type- IV sedation with anesthesia with ASA Monitors  Additional Monitors:   Airway Plan:           Plan Factors-Exercise tolerance (METS): <4 METS  Chart reviewed  Patient summary reviewed  Patient is not a current smoker  Induction- intravenous  Postoperative Plan-     Informed Consent- Anesthetic plan and risks discussed with patient

## 2022-02-01 NOTE — ANESTHESIA POSTPROCEDURE EVALUATION
Post-Op Assessment Note    CV Status:  Stable  Pain Score: 2    Pain management: adequate     Mental Status:  Alert and awake   Hydration Status:  Euvolemic   PONV Controlled:  Controlled   Airway Patency:  Patent      Post Op Vitals Reviewed: Yes      Staff: Anesthesiologist         No complications documented      /75 (02/01/22 1553)    Temp 99 1 °F (37 3 °C) (02/01/22 1553)    Pulse 90 (02/01/22 1553)   Resp 18 (02/01/22 1553)    SpO2 93 % (02/01/22 1553)

## 2022-02-01 NOTE — DISCHARGE INSTRUCTIONS
Loop Electrosurgical Excision Procedure   WHAT YOU NEED TO KNOW:   A loop electrosurgical excision procedure (LEEP) is used to remove abnormal tissue from your cervix or vagina  Your cervix is the opening of your uterus  Your healthcare provider will use a small wire loop that is heated by an electrical current to remove the tissue  DISCHARGE INSTRUCTIONS:   Medicines: You may need any of the following:  · Acetaminophen  decreases pain  It is available without a doctor's order  Ask how much to take and how often to take it  Follow directions  Acetaminophen can cause liver damage if not taken correctly  · NSAIDs  decrease pain and swelling  This medicine is available without a doctor's order  This medicine can cause stomach bleeding or kidney problems  If you take blood thinner medicine, always ask your healthcare provider if NSAIDs are safe for you  Always read the medicine label and follow the directions on it before you use this medicine  · Take your medicine as directed  Contact your healthcare provider if you think your medicine is not helping or if you have side effects  Tell him if you are allergic to any medicine  Keep a list of the medicines, vitamins, and herbs you take  Include the amounts, and when and why you take them  Bring the list or the pill bottles to follow-up visits  Carry your medicine list with you in case of an emergency  Follow up with your healthcare provider or gynecologist as directed:  Write down your questions so you remember to ask them during your visits  Rest:  Rest when you feel it is needed  Slowly start to do more each day  Return to your daily activities as directed  Vaginal care: It is normal to have mild cramping, spotting, or discharge for several days after your procedure  You may also have a thin, watery discharge for up to 4 weeks after your procedure  Use a clean sanitary pad as needed   Do not  use tampons, douche, or have sex until your healthcare provider or gynecologist says that it is okay  Bathing:  Do not take a bath or use a hot tub for 2 weeks after your procedure, or as directed by your healthcare provider or gynecologist  Amber Carrasco may shower during this time  Contact your healthcare provider or gynecologist if:   · You have a fever or chills  · You have nausea or are vomiting  · You have blood in your urine  · Your pad becomes soaked with blood  · You have foul-smelling drainage from your vagina  · You have pain when you urinate or have sex  · You have questions or concerns about your condition or care  Seek care immediately or call 911 if:   · You have heavy bleeding from your vagina  · You have severe abdominal or vaginal pain that does not go away, even after you take pain medicine  · You are urinating less than before your procedure  © 2016 [address] [employer] is for End User's use only and may not be sold, redistributed or otherwise used for commercial purposes  All illustrations and images included in CareNotes® are the copyrighted property of [employer] , [employer]  or Francois Duran  The above information is an  only  It is not intended as medical advice for individual conditions or treatments  Talk to your doctor, nurse or pharmacist before following any medical regimen to see if it is safe and effective for you

## 2022-02-15 ENCOUNTER — TELEPHONE (OUTPATIENT)
Dept: OBGYN CLINIC | Facility: CLINIC | Age: 57
End: 2022-02-15

## 2022-02-15 NOTE — TELEPHONE ENCOUNTER
Patient called, left message, cancelled 2/17/22 post op appointment because patient is positive for COVID  Left message for patient to call back for post op report

## 2022-02-15 NOTE — TELEPHONE ENCOUNTER
Ok, that is fine  If she feels ok, she does not need to come in  She is aware of her results, correct?

## 2022-02-15 NOTE — TELEPHONE ENCOUNTER
Called patient again, left message  Informed her again if her tissue pathology result and advised pap in 1 year  Advised call back if any post op problems

## 2022-03-01 ENCOUNTER — TELEPHONE (OUTPATIENT)
Dept: GYNECOLOGIC ONCOLOGY | Facility: CLINIC | Age: 57
End: 2022-03-01

## 2022-05-23 ENCOUNTER — OFFICE VISIT (OUTPATIENT)
Dept: GYNECOLOGIC ONCOLOGY | Facility: CLINIC | Age: 57
End: 2022-05-23
Payer: COMMERCIAL

## 2022-05-23 VITALS
WEIGHT: 259 LBS | TEMPERATURE: 98.6 F | HEIGHT: 64 IN | SYSTOLIC BLOOD PRESSURE: 124 MMHG | DIASTOLIC BLOOD PRESSURE: 80 MMHG | BODY MASS INDEX: 44.22 KG/M2

## 2022-05-23 DIAGNOSIS — D07.1 VIN III (VULVAR INTRAEPITHELIAL NEOPLASIA III): Primary | ICD-10-CM

## 2022-05-23 PROCEDURE — 99213 OFFICE O/P EST LOW 20 MIN: CPT | Performed by: OBSTETRICS & GYNECOLOGY

## 2022-05-23 RX ORDER — CYCLOSPORINE 0.5 MG/ML
EMULSION OPHTHALMIC
COMMUNITY
Start: 2022-05-23

## 2022-05-23 NOTE — LETTER
May 23, 2022     Joni Mcburney, DO  [address]    Patient: Kinsey Jones   YOB: 1965   Date of Visit: 5/23/2022       Dear Dr Ravindra Ann: Thank you for referring Thereasa Kocher to me for evaluation  Below are my notes for this consultation  If you have questions, please do not hesitate to call me  I look forward to following your patient along with you  Sincerely,        Mervin Childress MD        CC: Parul Stubbs MD  5/23/2022  9:31 AM  Incomplete  Assessment/Plan:    Problem List Items Addressed This Visit        Genitourinary    AME III (vulvar intraepithelial neoplasia III) - Primary     Patient remains stable  She has no evidence of recurrence of disease  She is dealing with other HPV related gyn disease  We discussed that she will need follow-up in 6 and 12 months and then annually thereafter  She would prefer to have this done with her primary gyn  I think this is very reasonable  She will see us on an as-needed basis  CHIEF COMPLAINT:  Surveillance for AME 3        Patient ID: Kinsey Jones is a 49 y o  female  Patient is very pleasant 61-year-old female with history of AME 3  Status post simple partial vulvectomy in approximately November of 2021  There was a questionable positive margin  Patient presents for for surveillance  In the interim she has undergone evaluation for abnormal Pap smear  All biopsies revealed low-grade TYSON  Patient has had no further vulvar symptoms  She denies significant itching burning or any other symptomatology  Today, the patient is doing well  She denies significant abdominal pain, pelvic pain, nausea, vomiting, constipation, diarrhea, fevers, chills, or vaginal bleeding          The following portions of the patient's history were reviewed and updated as appropriate: allergies, past family history, past medical history, past social history, past surgical history and problem list     Review of Systems   Constitutional: Negative  HENT: Negative  Eyes: Negative  Respiratory: Negative  Cardiovascular: Negative  Gastrointestinal: Negative  Endocrine: Negative  Genitourinary: Negative  Musculoskeletal: Negative  Skin: Negative  Neurological: Negative  Hematological: Negative  Psychiatric/Behavioral: Negative          Current Outpatient Medications   Medication Sig Dispense Refill    amLODIPine (NORVASC) 2 5 mg tablet TAKE 1 TABLET BY MOUTH DAILY WITH 5MG TABLET      amLODIPine (NORVASC) 5 mg tablet Take 10 mg by mouth daily      aspirin-acetaminophen-caffeine (EXCEDRIN MIGRAINE) 250-250-65 MG per tablet Take 1 tablet by mouth every 6 (six) hours as needed for headaches       cholecalciferol (VITAMIN D3) 1,000 units tablet Take 4,000 Units by mouth daily 2,000        clotrimazole-betamethasone (LOTRISONE) 1-0 05 % cream Apply topically 2 (two) times a day for 7 days 30 g 0    Flaxseed, Linseed, (Flaxseed Oil) 1000 MG CAPS Take 2,000 mg by mouth        lovastatin (MEVACOR) 40 MG tablet Take 40 mg by mouth every evening        MAGNESIUM-ZINC PO Take by mouth      Melatonin 10 MG TABS Take by mouth daily at bedtime       NON FORMULARY 2 capsules as needed Herbal laxative      NON FORMULARY Vision Essentials supplement       Nutritional Supplements (GRAPESEED EXTRACT PO) Take by mouth      Omega-3 Fatty Acids (Fish Oil) 1200 MG CPDR Take by mouth      PreviDent 5000 Dry Mouth 1 1 % GEL BRUSH ON TEETH TWICE A DAY MORNING AND NIGHT      QUEtiapine (SEROquel) 400 MG tablet Take 400 mg by mouth daily at bedtime       Restasis 0 05 % ophthalmic emulsion       SUMAtriptan (IMITREX) 100 mg tablet 100 mg once as needed for migraine       venlafaxine (EFFEXOR-XR) 150 mg 24 hr capsule Take 150 mg by mouth daily      zaleplon (SONATA) 10 MG capsule Take 10 mg by mouth daily at bedtime as needed As needed        No current facility-administered medications for this visit  Objective:    Blood pressure 124/80, temperature 98 6 °F (37 °C), temperature source Tympanic, height 5' 4" (1 626 m), weight 117 kg (259 lb), not currently breastfeeding  Body mass index is 44 46 kg/m²  Body surface area is 2 18 meters squared  Physical Exam  Constitutional:       Appearance: She is well-developed  HENT:      Head: Normocephalic and atraumatic  Eyes:      Pupils: Pupils are equal, round, and reactive to light  Cardiovascular:      Rate and Rhythm: Normal rate and regular rhythm  Heart sounds: Normal heart sounds  Pulmonary:      Effort: Pulmonary effort is normal  No respiratory distress  Breath sounds: Normal breath sounds  Chest:   Breasts:      Right: No supraclavicular adenopathy  Left: No supraclavicular adenopathy  Abdominal:      General: Bowel sounds are normal  There is no distension  Palpations: Abdomen is soft  Abdomen is not rigid  Tenderness: There is no abdominal tenderness  There is no guarding or rebound  Genitourinary:     Comments: -Normal external female genitalia, normal Bartholin's and [place]'s glandsStatus post vulvectomy no obvious recurrence seen or palpated suture lines healed                  -Normal midline urethral meatus  No lesions notes                  -Bladder without fullness mass or tenderness                  -Vagina without lesion or discharge No significant cystocele or rectocele noted                  -Cervix normal appearing without visible lesions                  -Uterus with normal contour, mobility  No tenderness,                  -Adnexae without  mass or tenderness                  - Anus without fissure of lesion    Musculoskeletal:         General: Normal range of motion  Cervical back: Normal range of motion and neck supple  Lymphadenopathy:      Cervical: No cervical adenopathy  Upper Body:      Right upper body: No supraclavicular adenopathy        Left upper body: No supraclavicular adenopathy  Skin:     General: Skin is warm and dry  Neurological:      Mental Status: She is alert and oriented to person, place, and time  Psychiatric:         Behavior: Behavior normal          No results found for:   Lab Results   Component Value Date    K 3 6 01/27/2022     01/27/2022    CO2 29 01/27/2022    BUN 9 01/27/2022    CREATININE 0 73 01/27/2022    GLUF 113 (H) 01/27/2022    CALCIUM 9 2 01/27/2022    AST 29 01/27/2022    ALT 55 01/27/2022    ALKPHOS 51 01/27/2022    EGFR 93 01/27/2022     Lab Results   Component Value Date    WBC 7 56 01/27/2022    HGB 14 2 01/27/2022    HCT 43 0 01/27/2022    MCV 91 01/27/2022     01/27/2022     Lab Results   Component Value Date    NEUTROABS 4 68 01/27/2022        Trend:  No results found for:                Maricel Santoyo MD  5/23/2022  9:07 AM  Incomplete  Assessment/Plan:    Problem List Items Addressed This Visit    None           CHIEF COMPLAINT:       Problem:  Cancer Staging  No matching staging information was found for the patient  Previous therapy:  Oncology History    No history exists           Patient ID: Gaurav Davies is a 49 y o  female  HPI    The following portions of the patient's history were reviewed and updated as appropriate: allergies, past family history, past medical history, past social history, past surgical history and problem list     Review of Systems    Current Outpatient Medications   Medication Sig Dispense Refill    amLODIPine (NORVASC) 2 5 mg tablet TAKE 1 TABLET BY MOUTH DAILY WITH 5MG TABLET      amLODIPine (NORVASC) 5 mg tablet Take 10 mg by mouth daily      aspirin-acetaminophen-caffeine (EXCEDRIN MIGRAINE) 250-250-65 MG per tablet Take 1 tablet by mouth every 6 (six) hours as needed for headaches       cholecalciferol (VITAMIN D3) 1,000 units tablet Take 4,000 Units by mouth daily 2,000        clotrimazole-betamethasone (LOTRISONE) 1-0 05 % cream Apply topically 2 (two) times a day for 7 days 30 g 0    Flaxseed, Linseed, (Flaxseed Oil) 1000 MG CAPS Take 2,000 mg by mouth        lovastatin (MEVACOR) 40 MG tablet Take 40 mg by mouth every evening        MAGNESIUM-ZINC PO Take by mouth      Melatonin 10 MG TABS Take by mouth daily at bedtime       NON FORMULARY 2 capsules as needed Herbal laxative      NON FORMULARY [employer] Essentials supplement       Nutritional Supplements (GRAPESEED EXTRACT PO) Take by mouth      Omega-3 Fatty Acids (Fish Oil) 1200 MG CPDR Take by mouth      PreviDent 5000 Dry Mouth 1 1 % GEL BRUSH ON TEETH TWICE A DAY MORNING AND NIGHT      QUEtiapine (SEROquel) 400 MG tablet Take 400 mg by mouth daily at bedtime       SUMAtriptan (IMITREX) 100 mg tablet 100 mg once as needed for migraine       venlafaxine (EFFEXOR-XR) 150 mg 24 hr capsule Take 150 mg by mouth daily      zaleplon (SONATA) 10 MG capsule Take 10 mg by mouth daily at bedtime as needed As needed        No current facility-administered medications for this visit  Objective:    not currently breastfeeding  There is no height or weight on file to calculate BMI  There is no height or weight on file to calculate BSA      Physical Exam    No results found for:   Lab Results   Component Value Date    K 3 6 01/27/2022     01/27/2022    CO2 29 01/27/2022    BUN 9 01/27/2022    CREATININE 0 73 01/27/2022    GLUF 113 (H) 01/27/2022    CALCIUM 9 2 01/27/2022    AST 29 01/27/2022    ALT 55 01/27/2022    ALKPHOS 51 01/27/2022    EGFR 93 01/27/2022     Lab Results   Component Value Date    WBC 7 56 01/27/2022    HGB 14 2 01/27/2022    HCT 43 0 01/27/2022    MCV 91 01/27/2022     01/27/2022     Lab Results   Component Value Date    NEUTROABS 4 68 01/27/2022        Trend:  No results found for:

## 2022-05-23 NOTE — PROGRESS NOTES
Assessment/Plan:    Problem List Items Addressed This Visit        Genitourinary    AME III (vulvar intraepithelial neoplasia III) - Primary     Patient remains stable  She has no evidence of recurrence of disease  She is dealing with other HPV related gyn disease  We discussed that she will need follow-up in 6 and 12 months and then annually thereafter  She would prefer to have this done with her primary gyn  I think this is very reasonable  She will see us on an as-needed basis  CHIEF COMPLAINT:  Surveillance for MAE 3        Patient ID: Bre Argueta is a 49 y o  female  Patient is very pleasant 45-year-old female with history of AME 3  Status post simple partial vulvectomy in approximately November of 2021  There was a questionable positive margin  Patient presents for for surveillance  In the interim she has undergone evaluation for abnormal Pap smear  All biopsies revealed low-grade TYSON  Patient has had no further vulvar symptoms  She denies significant itching burning or any other symptomatology  Today, the patient is doing well  She denies significant abdominal pain, pelvic pain, nausea, vomiting, constipation, diarrhea, fevers, chills, or vaginal bleeding  The following portions of the patient's history were reviewed and updated as appropriate: allergies, past family history, past medical history, past social history, past surgical history and problem list     Review of Systems   Constitutional: Negative  HENT: Negative  Eyes: Negative  Respiratory: Negative  Cardiovascular: Negative  Gastrointestinal: Negative  Endocrine: Negative  Genitourinary: Negative  Musculoskeletal: Negative  Skin: Negative  Neurological: Negative  Hematological: Negative  Psychiatric/Behavioral: Negative          Current Outpatient Medications   Medication Sig Dispense Refill    amLODIPine (NORVASC) 2 5 mg tablet TAKE 1 TABLET BY MOUTH DAILY WITH 5MG TABLET      amLODIPine (NORVASC) 5 mg tablet Take 10 mg by mouth daily      aspirin-acetaminophen-caffeine (EXCEDRIN MIGRAINE) 250-250-65 MG per tablet Take 1 tablet by mouth every 6 (six) hours as needed for headaches       cholecalciferol (VITAMIN D3) 1,000 units tablet Take 4,000 Units by mouth daily 2,000        clotrimazole-betamethasone (LOTRISONE) 1-0 05 % cream Apply topically 2 (two) times a day for 7 days 30 g 0    Flaxseed, Linseed, (Flaxseed Oil) 1000 MG CAPS Take 2,000 mg by mouth        lovastatin (MEVACOR) 40 MG tablet Take 40 mg by mouth every evening        MAGNESIUM-ZINC PO Take by mouth      Melatonin 10 MG TABS Take by mouth daily at bedtime       NON FORMULARY 2 capsules as needed Herbal laxative      NON FORMULARY [employer] supplement       Nutritional Supplements (GRAPESEED EXTRACT PO) Take by mouth      Omega-3 Fatty Acids (Fish Oil) 1200 MG CPDR Take by mouth      PreviDent 5000 Dry Mouth 1 1 % GEL BRUSH ON TEETH TWICE A DAY MORNING AND NIGHT      QUEtiapine (SEROquel) 400 MG tablet Take 400 mg by mouth daily at bedtime       Restasis 0 05 % ophthalmic emulsion       SUMAtriptan (IMITREX) 100 mg tablet 100 mg once as needed for migraine       venlafaxine (EFFEXOR-XR) 150 mg 24 hr capsule Take 150 mg by mouth daily      zaleplon (SONATA) 10 MG capsule Take 10 mg by mouth daily at bedtime as needed As needed        No current facility-administered medications for this visit  Objective:    Blood pressure 124/80, temperature 98 6 °F (37 °C), temperature source Tympanic, height 5' 4" (1 626 m), weight 117 kg (259 lb), not currently breastfeeding  Body mass index is 44 46 kg/m²  Body surface area is 2 18 meters squared  Physical Exam  Constitutional:       Appearance: She is well-developed  HENT:      Head: Normocephalic and atraumatic  Eyes:      Pupils: Pupils are equal, round, and reactive to light     Cardiovascular:      Rate and Rhythm: Normal rate and regular rhythm  Heart sounds: Normal heart sounds  Pulmonary:      Effort: Pulmonary effort is normal  No respiratory distress  Breath sounds: Normal breath sounds  Chest:   Breasts:      Right: No supraclavicular adenopathy  Left: No supraclavicular adenopathy  Abdominal:      General: Bowel sounds are normal  There is no distension  Palpations: Abdomen is soft  Abdomen is not rigid  Tenderness: There is no abdominal tenderness  There is no guarding or rebound  Genitourinary:     Comments: -Normal external female genitalia, normal Bartholin's and Inger's glandsStatus post vulvectomy no obvious recurrence seen or palpated suture lines healed                  -Normal midline urethral meatus  No lesions notes                  -Bladder without fullness mass or tenderness                  -Vagina without lesion or discharge No significant cystocele or rectocele noted                  -Cervix normal appearing without visible lesions                  -Uterus with normal contour, mobility  No tenderness,                  -Adnexae without  mass or tenderness                  - Anus without fissure of lesion    Musculoskeletal:         General: Normal range of motion  Cervical back: Normal range of motion and neck supple  Lymphadenopathy:      Cervical: No cervical adenopathy  Upper Body:      Right upper body: No supraclavicular adenopathy  Left upper body: No supraclavicular adenopathy  Skin:     General: Skin is warm and dry  Neurological:      Mental Status: She is alert and oriented to person, place, and time     Psychiatric:         Behavior: Behavior normal          No results found for:   Lab Results   Component Value Date    K 3 6 01/27/2022     01/27/2022    CO2 29 01/27/2022    BUN 9 01/27/2022    CREATININE 0 73 01/27/2022    GLUF 113 (H) 01/27/2022    CALCIUM 9 2 01/27/2022    AST 29 01/27/2022    ALT 55 01/27/2022    ALKPHOS 51 01/27/2022 EGFR 93 01/27/2022     Lab Results   Component Value Date    WBC 7 56 01/27/2022    HGB 14 2 01/27/2022    HCT 43 0 01/27/2022    MCV 91 01/27/2022     01/27/2022     Lab Results   Component Value Date    NEUTROABS 4 68 01/27/2022        Trend:  No results found for:

## 2022-05-23 NOTE — ASSESSMENT & PLAN NOTE
Patient remains stable  She has no evidence of recurrence of disease  She is dealing with other HPV related gyn disease  We discussed that she will need follow-up in 6 and 12 months and then annually thereafter  She would prefer to have this done with her primary gyn  I think this is very reasonable  She will see us on an as-needed basis

## 2022-11-29 ENCOUNTER — ANNUAL EXAM (OUTPATIENT)
Dept: GYNECOLOGY | Facility: CLINIC | Age: 57
End: 2022-11-29

## 2022-11-29 VITALS
WEIGHT: 256 LBS | BODY MASS INDEX: 43.71 KG/M2 | HEIGHT: 64 IN | DIASTOLIC BLOOD PRESSURE: 86 MMHG | SYSTOLIC BLOOD PRESSURE: 130 MMHG

## 2022-11-29 DIAGNOSIS — Z11.51 SCREENING FOR HPV (HUMAN PAPILLOMAVIRUS): ICD-10-CM

## 2022-11-29 DIAGNOSIS — Z12.31 ENCOUNTER FOR SCREENING MAMMOGRAM FOR BREAST CANCER: ICD-10-CM

## 2022-11-29 DIAGNOSIS — N95.2 VAGINAL ATROPHY: ICD-10-CM

## 2022-11-29 DIAGNOSIS — Z01.419 ENCOUNTER FOR ANNUAL ROUTINE GYNECOLOGICAL EXAMINATION: Primary | ICD-10-CM

## 2022-11-29 RX ORDER — ESTRADIOL 0.1 MG/G
CREAM VAGINAL
Qty: 42.5 G | Refills: 1 | Status: SHIPPED | OUTPATIENT
Start: 2022-11-29

## 2022-11-29 NOTE — PROGRESS NOTES
Assessment/Plan:    Pap and HPV done today  Atrophic speculum used  Could not visualize cervix due to very narrow introitus  Will await results  If needed, can try repeat pap after a few months of the vaginal estrogen    Atrophy and decreased elasticity from vulvectomy - will start using vaginal estrogen topically on the introitus twice a week  This may help with her discomfort although it may not help with the post op vulvectomy changes  If this does not help and we are unable to evaluated her cervix, we could consider pelvic floor PT and or vaginal dilators    RX sent for estradiol cream, instructed to apply to the introitus using her finger and not use the applicator  mammogram reviewed with her including breast density  RX given and she has this scheduled for December  Discussed self breast exams    colon cancer screening - colonoscopy done 2 years ago, recall  In 10 years    discussed preventive care, regular exercise and a healthy diet      No problem-specific Assessment & Plan notes found for this encounter  Diagnoses and all orders for this visit:    Encounter for annual routine gynecological examination  -     Liquid-based pap, screening    Encounter for screening mammogram for breast cancer  -     Mammo screening bilateral w 3d & cad; Future    Vaginal atrophy  -     estradiol (ESTRACE VAGINAL) 0 1 mg/g vaginal cream; Apply a small amount to introitus twice a week at bedtime    Screening for HPV (human papillomavirus)  -     Liquid-based pap, screening          Subjective:      Patient ID: Anisa So is a 49 y o  female  Pt here for yearly  She has AME III that was followed by gyn onc but she prefers to come here  She underwent a simple partial vulvectomy  Last year she also had an ANTONIO pap  This was followed by a normal EMB, normal ECC and LEEP done in the OR that showed mild dysplasia  This was done in the OR because she was having discomfort from her vulvectomy    She feels that her vaginal opening is 'closed'    She has not had a menstrual cycle since 2-8-22 and feels that she would not be able to insert a tampon if she did get her cycle  No actual hot flashes but ofter feels warm  Normal 3D mammogram last November with scattered fibroglandular densities  She has this scheduled for next month  The following portions of the patient's history were reviewed and updated as appropriate: allergies, current medications, past family history, past medical history, past social history, past surgical history and problem list     Review of Systems   Constitutional: Negative  Gastrointestinal: Negative  Endocrine: Positive for heat intolerance  Genitourinary: Negative  Objective: There were no vitals taken for this visit  Physical Exam  Vitals reviewed  Constitutional:       Appearance: She is well-developed  Neck:      Thyroid: No thyromegaly  Trachea: No tracheal deviation  Cardiovascular:      Rate and Rhythm: Normal rate and regular rhythm  Pulmonary:      Effort: Pulmonary effort is normal       Breath sounds: Normal breath sounds  Comments: Examined seated and supine  Chest:   Breasts:     Breasts are symmetrical       Right: No inverted nipple, mass, nipple discharge, skin change or tenderness  Left: No inverted nipple, mass, nipple discharge, skin change or tenderness  Abdominal:      General: There is no distension  Palpations: Abdomen is soft  There is no mass  Tenderness: There is no abdominal tenderness  Genitourinary:     Labia: No labial fusion  Right: No rash, tenderness, lesion or injury  Left: No rash, tenderness, lesion or injury  Vagina: Normal       Cervix: No cervical motion tenderness, discharge or friability  Uterus: Normal        Adnexa:         Right: No mass, tenderness or fullness  Left: No mass, tenderness or fullness          Rectum: Normal       Comments: No external lesion noted  Vulva is well healed from vulvectomy  Introitus is very small, cannot do bimanual because of this  Atrophic speculum used and visualization is very limited  Cannot see cervix due to patient's discomfort

## 2022-12-07 LAB
LAB AP GYN PRIMARY INTERPRETATION: NORMAL
Lab: NORMAL
PATH INTERP SPEC-IMP: NORMAL

## 2022-12-12 DIAGNOSIS — Z12.31 ENCOUNTER FOR SCREENING MAMMOGRAM FOR BREAST CANCER: ICD-10-CM

## 2023-05-30 ENCOUNTER — TELEPHONE (OUTPATIENT)
Dept: GYNECOLOGY | Facility: CLINIC | Age: 58
End: 2023-05-30

## 2023-05-30 NOTE — TELEPHONE ENCOUNTER
Discussed with patients insurance company about coverage on the vaginal dilators    The insurance company said that she has met her deductible and that the dilators should be covered at 100 %    The specialty pharmacy --[facility] out of [place] has ordered the dilators and the pharmacy said that they can't put it thru her insurance, but they can give her a receipt and she can submit it to be reimbursed    Left a message for pt to call back to discuss this  Rashawn Kohli

## 2023-07-25 ENCOUNTER — TELEPHONE (OUTPATIENT)
Dept: GYNECOLOGY | Facility: CLINIC | Age: 58
End: 2023-07-25

## 2023-07-25 NOTE — TELEPHONE ENCOUNTER
Patient called about the status of her vaginal dilators. Did not answer return call. Left message reading her Faisal Davonte Cole's message from 5/30/23. Left message with the phone number of [facility], Specialty pharmacy. Advised patient to call there to ask about their process and to ask how to obtain the dilators that they ordered for her.

## 2023-07-27 ENCOUNTER — TELEPHONE (OUTPATIENT)
Dept: GYNECOLOGY | Facility: CLINIC | Age: 58
End: 2023-07-27

## 2023-07-27 NOTE — TELEPHONE ENCOUNTER
Patient called back to say she called [facility] [place] [facility] and was told that they do not have any vaginal dilators and will not be getting any in the future. Advised patient to ask her insurance company if she can buy them herself on [employer] and them submit the receipt for reimbursement. Explained to patient that not every item suggested by a doctor is covered by insurance. Explained that the nurses here have tried various Specialty Pharmacies and have been unable to find one who handles this.

## 2023-12-04 NOTE — PROGRESS NOTES
Assessment/Plan:    Pap and HPV done today with only the Cytobrush. This was partially inserted into the introitus. This was all that could be done due to the severe stenosis. History of AME 3 and EJNNIFER-1-she has severe and worsening vaginal stenosis which is made worse by atrophy due to menopause. She has been using vaginal estrogen with only minimal relief. It may be possible that just the introitus is narrow. I will discuss with Dr. Jayesh Whaley who did her vulvectomy. She will continue using the vaginal estrogen. I will also talk to pelvic floor PT to see if they have anything to offer. She has been trying to get vaginal dilators but has had insurance issues. Vulvar itching-there is mild erythema and appears to be candidiasis. Will treat with Lotrisone. I recommended that if she does not have complete relief after using Lotrisone for 1 week, she must call. She may then need a vulvar biopsy as this is how we diagnosed her AME 3.    mammogram reviewed with her including breast density. RX given for this month  Discussed self breast exams    colon cancer screening open colonoscopy done in 2020, recall in 10 years    discussed preventive care, regular exercise and a healthy diet      No problem-specific Assessment & Plan notes found for this encounter. Diagnoses and all orders for this visit:    Encounter for annual routine gynecological examination  -     Liquid-based pap, screening    Encounter for screening mammogram for breast cancer  -     Mammo screening bilateral w 3d & cad; Future    Vulvar candidiasis  -     clotrimazole-betamethasone (LOTRISONE) 1-0.05 % cream; Apply topically 2 (two) times a day for 7 days To affected area          Subjective:      Patient ID: Jeferson Salmeron is a 66 y.o. female. Pt here for yearly. She is having anterior vulvar itching, it is not every day but approximately every 3 to 4 days. She has a long history of abnormal Paps.   Last year, her introitus was stenotic    Normal pap, + HPV in 2022, could not do colposcopy due to extreme vaginal atrophy and stenosis. I prescribed vaginal estrogen. She was also going to start using vaginal dilators but was having difficulty finding them. She is s/p vulvectomy due to AME 3, this makes this much more difficult. And it is difficult to place even a small atrophic speculum. Normal 3D mammogram last December with fatty tissue        The following portions of the patient's history were reviewed and updated as appropriate: allergies, current medications, past family history, past medical history, past social history, past surgical history, and problem list.    Review of Systems   Constitutional: Negative. Gastrointestinal: Negative. Genitourinary: Negative. Objective: There were no vitals taken for this visit. Physical Exam  Vitals reviewed. Constitutional:       Appearance: Normal appearance. She is well-developed. Neck:      Thyroid: No thyromegaly. Trachea: No tracheal deviation. Cardiovascular:      Rate and Rhythm: Normal rate and regular rhythm. Pulmonary:      Effort: Pulmonary effort is normal.      Breath sounds: Normal breath sounds. Chest:   Breasts:     Breasts are symmetrical.      Right: No inverted nipple, mass, nipple discharge, skin change or tenderness. Left: No inverted nipple, mass, nipple discharge, skin change or tenderness. Abdominal:      General: There is no distension. Palpations: Abdomen is soft. There is no mass. Tenderness: There is no abdominal tenderness. Genitourinary:     Labia:         Right: No rash, tenderness, lesion or injury. Left: No rash, tenderness, lesion or injury. Vagina: Normal.      Cervix: No cervical motion tenderness, discharge or friability. Adnexa:         Right: No mass, tenderness or fullness. Left: No mass, tenderness or fullness.         Rectum: Normal.      Comments: Introitus is extremely stenotic  Attempted to do a Pap but could only insert the Cytobrush about 1.5 cm into the introitus. It was quite uncomfortable. Last year, I was able to insert a small atrophic speculum    She has some mild erythema anteriorly  Neurological:      Mental Status: She is alert.

## 2023-12-05 ENCOUNTER — ANNUAL EXAM (OUTPATIENT)
Dept: GYNECOLOGY | Facility: CLINIC | Age: 58
End: 2023-12-05
Payer: COMMERCIAL

## 2023-12-05 VITALS
DIASTOLIC BLOOD PRESSURE: 80 MMHG | BODY MASS INDEX: 44.9 KG/M2 | SYSTOLIC BLOOD PRESSURE: 126 MMHG | HEIGHT: 64 IN | WEIGHT: 263 LBS

## 2023-12-05 DIAGNOSIS — Z12.31 ENCOUNTER FOR SCREENING MAMMOGRAM FOR BREAST CANCER: ICD-10-CM

## 2023-12-05 DIAGNOSIS — B37.31 VULVAR CANDIDIASIS: ICD-10-CM

## 2023-12-05 DIAGNOSIS — Z01.419 ENCOUNTER FOR ANNUAL ROUTINE GYNECOLOGICAL EXAMINATION: Primary | ICD-10-CM

## 2023-12-05 PROCEDURE — S0612 ANNUAL GYNECOLOGICAL EXAMINA: HCPCS | Performed by: OBSTETRICS & GYNECOLOGY

## 2023-12-05 PROCEDURE — G0124 SCREEN C/V THIN LAYER BY MD: HCPCS | Performed by: STUDENT IN AN ORGANIZED HEALTH CARE EDUCATION/TRAINING PROGRAM

## 2023-12-05 PROCEDURE — G0476 HPV COMBO ASSAY CA SCREEN: HCPCS | Performed by: OBSTETRICS & GYNECOLOGY

## 2023-12-05 PROCEDURE — G0145 SCR C/V CYTO,THINLAYER,RESCR: HCPCS | Performed by: STUDENT IN AN ORGANIZED HEALTH CARE EDUCATION/TRAINING PROGRAM

## 2023-12-05 RX ORDER — CLOTRIMAZOLE AND BETAMETHASONE DIPROPIONATE 10; .64 MG/G; MG/G
CREAM TOPICAL 2 TIMES DAILY
Qty: 30 G | Refills: 1 | Status: SHIPPED | OUTPATIENT
Start: 2023-12-05 | End: 2023-12-12

## 2023-12-12 LAB
LAB AP GYN PRIMARY INTERPRETATION: ABNORMAL
Lab: ABNORMAL
PATH INTERP SPEC-IMP: ABNORMAL

## 2023-12-27 ENCOUNTER — TELEPHONE (OUTPATIENT)
Dept: GYNECOLOGY | Facility: CLINIC | Age: 58
End: 2023-12-27

## 2023-12-27 NOTE — TELEPHONE ENCOUNTER
----- Message from Silvina Bazan DO sent at 12/22/2023  4:08 PM EST -----  I did get in touch with the pelvic floor physical therapist.  They could assist her with the vaginal dilators.  I think this would be helpful to her but first she needs to see Dr. Kwong because she had another abnormal Pap and her cervix cannot be evaluated due to narrowing of the introitus.  She stated that she was planning to make an appointment with him after she served jury duty.  Please ask her to do this and then once she sees him, I will place the referral for pelvic floor physical therapy.  Thank you

## 2023-12-28 ENCOUNTER — TELEPHONE (OUTPATIENT)
Dept: HEMATOLOGY ONCOLOGY | Facility: CLINIC | Age: 58
End: 2023-12-28

## 2023-12-28 NOTE — TELEPHONE ENCOUNTER
Appointment Schedule   Who are you speaking with? Patient   If it is not the patient, are they listed on an active communication consent form? Yes   Which provider is the appointment scheduled with? Dr. Kwong   At which location is the appointment scheduled for? [place]   When is the appointment scheduled?  Please list date and time 1/16/24   What is the reason for this appointment? fu   Did patient voice understanding of the details of this appointment? Yes   Was the no show policy reviewed with patient? Yes

## 2024-01-11 DIAGNOSIS — Z12.31 ENCOUNTER FOR SCREENING MAMMOGRAM FOR BREAST CANCER: ICD-10-CM

## 2024-01-15 ENCOUNTER — TELEPHONE (OUTPATIENT)
Dept: GYNECOLOGIC ONCOLOGY | Facility: CLINIC | Age: 59
End: 2024-01-15

## 2024-01-15 ENCOUNTER — TELEPHONE (OUTPATIENT)
Dept: HEMATOLOGY ONCOLOGY | Facility: CLINIC | Age: 59
End: 2024-01-15

## 2024-01-15 NOTE — TELEPHONE ENCOUNTER
Appointment Confirmation   Who are you speaking with? Patient   If it is not the patient, are they listed on an active communication consent form? N/A   Which provider is the appointment scheduled with?  Dr. Kwong   When is the appointment scheduled?  Please list date and time 1/16/24 8am   At which location is the appointment scheduled to take place? [place]   Did caller verbalize understanding of appointment details? Yes

## 2024-01-18 ENCOUNTER — TELEPHONE (OUTPATIENT)
Dept: HEMATOLOGY ONCOLOGY | Facility: CLINIC | Age: 59
End: 2024-01-18

## 2024-01-18 NOTE — TELEPHONE ENCOUNTER
Appointment Change  Cancel, Reschedule, Change to Virtual      Who are you speaking with? Patient   If it is not the patient, is the caller listed on the communication consent form? N/A   Which provider is the appointment scheduled with? Dr. Kwong   When was the original appointment scheduled?    Please list date and time 1/19/24 @ 818   At which location is the appointment scheduled to take place? [place]   Was the appointment rescheduled?     Was the appointment changed from an in person visit to a virtual visit?    If so, please list the details of the change. 2/19/24 @ 811   What is the reason for the appointment change? Wanted to change due to weather conditions        Was STAR transport scheduled? No   Does STAR transport need to be scheduled for the new visit (if applicable) No   Does the patient need an infusion appointment rescheduled? No   Does the patient have an upcoming infusion appointment scheduled? If so, when? No   Is the patient undergoing chemotherapy? No   For appointments cancelled with less than 24 hours:  Was the no-show policy reviewed? N/A

## 2024-01-18 NOTE — TELEPHONE ENCOUNTER
Appointment Confirmation   Who are you speaking with? Patient   If it is not the patient, are they listed on an active communication consent form? N/A   Which provider is the appointment scheduled with?  Dr. Kwong   When is the appointment scheduled?  Please list date and time 02/19/2024 @ 8:15AM    At which location is the appointment scheduled to take place? [place]   Did caller verbalize understanding of appointment details? Yes

## 2024-01-23 ENCOUNTER — TELEPHONE (OUTPATIENT)
Dept: HEMATOLOGY ONCOLOGY | Facility: CLINIC | Age: 59
End: 2024-01-23

## 2024-01-24 ENCOUNTER — TELEPHONE (OUTPATIENT)
Dept: HEMATOLOGY ONCOLOGY | Facility: CLINIC | Age: 59
End: 2024-01-24

## 2024-01-24 NOTE — TELEPHONE ENCOUNTER
Appointment Change  Cancel, Reschedule, Change to Virtual      Who are you speaking with? Patient   If it is not the patient, is the caller listed on the communication consent form? Yes   Which provider is the appointment scheduled with? Dr. Kwong   When was the original appointment scheduled?    Please list date and time 2/19/24   At which location is the appointment scheduled to take place? [place]   Was the appointment rescheduled?     Was the appointment changed from an in person visit to a virtual visit?    If so, please list the details of the change. 2/1/24   What is the reason for the appointment change? Wants sooner appt

## 2024-01-31 ENCOUNTER — TELEPHONE (OUTPATIENT)
Dept: HEMATOLOGY ONCOLOGY | Facility: CLINIC | Age: 59
End: 2024-01-31

## 2024-01-31 NOTE — TELEPHONE ENCOUNTER
Appointment Confirmation   Who are you speaking with? Patient   If it is not the patient, are they listed on an active communication consent form? N/A   Which provider is the appointment scheduled with?  Dr. Kwong   When is the appointment scheduled?  Please list date and time 2/1/24 @ 8:30am   At which location is the appointment scheduled to take place? [place]   Did caller verbalize understanding of appointment details? Yes

## 2024-02-01 ENCOUNTER — OFFICE VISIT (OUTPATIENT)
Dept: GYNECOLOGIC ONCOLOGY | Facility: CLINIC | Age: 59
End: 2024-02-01
Payer: COMMERCIAL

## 2024-02-01 VITALS
OXYGEN SATURATION: 97 % | BODY MASS INDEX: 45.66 KG/M2 | HEART RATE: 81 BPM | WEIGHT: 266 LBS | SYSTOLIC BLOOD PRESSURE: 150 MMHG | TEMPERATURE: 97.7 F | DIASTOLIC BLOOD PRESSURE: 84 MMHG

## 2024-02-01 DIAGNOSIS — R87.612 LGSIL ON PAP SMEAR OF CERVIX: Primary | ICD-10-CM

## 2024-02-01 DIAGNOSIS — B37.9 YEAST INFECTION: ICD-10-CM

## 2024-02-01 DIAGNOSIS — D07.1 VIN III (VULVAR INTRAEPITHELIAL NEOPLASIA III): ICD-10-CM

## 2024-02-01 PROCEDURE — 99215 OFFICE O/P EST HI 40 MIN: CPT | Performed by: OBSTETRICS & GYNECOLOGY

## 2024-02-01 RX ORDER — FLUCONAZOLE 150 MG/1
150 TABLET ORAL ONCE
Qty: 1 TABLET | Refills: 0 | Status: SHIPPED | OUTPATIENT
Start: 2024-02-01 | End: 2024-02-01

## 2024-02-01 NOTE — PROGRESS NOTES
Assessment/Plan:    Problem List Items Addressed This Visit       AME III (vulvar intraepithelial neoplasia III)     Stable without evidence of recurrence of disease.  Patient will follow-up every 6 months for repeat evaluation.         LGSIL on Pap smear of cervix - Primary    Relevant Orders    Case request operating room: COLPOSCOPY and episiotomy (Completed)    Type and screen    Basic metabolic panel    CBC and differential    EKG 12 lead    XR chest pa & lateral     Other Visit Diagnoses       Yeast infection        Relevant Medications    fluconazole (DIFLUCAN) 150 mg tablet                CHIEF COMPLAINT: Abnormal Pap      Subjective:     Problem:  Cancer Staging   No matching staging information was found for the patient.      Previous therapy:  Oncology History    No history exists.         Patient ID: Pratima Kline is a 58 y.o. female  Patient is a very pleasant 58-year-old female with a history of simple partial vulvectomy for AME 3.  Patient has introital narrowing and recently underwent Pap smear which reveals low-grade TYSON.  The patient is also HPV 16 positive.  The patient has significant vaginal stenosis and was unable to undergo evaluation in the office.  She presents today in follow-up.    The patient does note some vulvar itching but is otherwise without complaint.  Today, the patient is doing well.  She denies significant abdominal pain, pelvic pain, nausea, vomiting, constipation, diarrhea, fevers, chills, or vaginal bleeding.           Review of Systems   Constitutional: Negative.    HENT: Negative.     Eyes: Negative.    Respiratory: Negative.     Cardiovascular: Negative.    Gastrointestinal: Negative.    Endocrine: Negative.    Genitourinary: Negative.    Musculoskeletal: Negative.    Skin: Negative.    Neurological: Negative.    Hematological: Negative.    Psychiatric/Behavioral: Negative.         Current Outpatient Medications   Medication Sig Dispense Refill    amLODIPine (NORVASC) 10  mg tablet Take 10 mg by mouth daily      amLODIPine (NORVASC) 2.5 mg tablet TAKE 1 TABLET BY MOUTH DAILY WITH 5MG TABLET      amLODIPine (NORVASC) 5 mg tablet Take 10 mg by mouth daily      aspirin-acetaminophen-caffeine (EXCEDRIN MIGRAINE) 250-250-65 MG per tablet Take 1 tablet by mouth every 6 (six) hours as needed for headaches       cholecalciferol (VITAMIN D3) 1,000 units tablet Take 4,000 Units by mouth daily 2,000        estradiol (ESTRACE VAGINAL) 0.1 mg/g vaginal cream Apply a small amount to introitus twice a week at bedtime 42.5 g 1    Flaxseed, Linseed, (Flaxseed Oil) 1000 MG CAPS Take 2,000 mg by mouth        fluconazole (DIFLUCAN) 150 mg tablet Take 1 tablet (150 mg total) by mouth once for 1 dose 1 tablet 0    lovastatin (MEVACOR) 40 MG tablet Take 40 mg by mouth every evening        MAGNESIUM-ZINC PO Take by mouth      Melatonin 10 MG TABS Take by mouth daily at bedtime       NON FORMULARY 2 capsules as needed Herbal laxative      NON FORMULARY [employer] supplement       Nutritional Supplements (GRAPESEED EXTRACT PO) Take by mouth      Omega-3 Fatty Acids (Fish Oil) 1200 MG CPDR Take by mouth      PreviDent 5000 Dry Mouth 1.1 % GEL BRUSH ON TEETH TWICE A DAY MORNING AND NIGHT      QUEtiapine (SEROquel) 400 MG tablet Take 400 mg by mouth daily at bedtime       Restasis 0.05 % ophthalmic emulsion       SUMAtriptan (IMITREX) 100 mg tablet 100 mg once as needed for migraine       venlafaxine (EFFEXOR-XR) 150 mg 24 hr capsule Take 150 mg by mouth daily      zaleplon (SONATA) 10 MG capsule Take 10 mg by mouth daily at bedtime as needed As needed       clotrimazole-betamethasone (LOTRISONE) 1-0.05 % cream Apply topically 2 (two) times a day for 7 days To affected area 30 g 1     No current facility-administered medications for this visit.       Allergies   Allergen Reactions    Fentanyl Vomiting     restlessness       Past Medical History:   Diagnosis Date    Abnormal Pap smear of cervix     Anxiety  "    Bipolar disorder (HCC)     pt reports \"doesn't feel she has it\"    Colon polyp     CPAP (continuous positive airway pressure) dependence     Depression     Glaucoma     HPV (human papilloma virus) infection     Hyperlipidemia     Hypertension     Knee pain, bilateral     occas    Migraines     PONV (postoperative nausea and vomiting)     Prediabetes     Rosacea     Shortness of breath     not new per pt \"usually going up stairs\"    Sleep apnea     Wears glasses     reading       Past Surgical History:   Procedure Laterality Date    CATARACT EXTRACTION Bilateral     COLONOSCOPY      EXAMINATION UNDER ANESTHESIA N/A 2/1/2022    Procedure: EXAM UNDER ANESTHESIA (EUA);  Surgeon: Silvina Bazan DO;  Location: [facility];  Service: Gynecology    NJ COLPOSCOPY CERVIX BX CERVIX & ENDOCRV CURRETAGE N/A 2/1/2022    Procedure: COLPOSCOPY, ENDOMETRIAL BIOPSY;  LEEP;  Surgeon: Silvina Bazan DO;  Location: [facility];  Service: Gynecology    NJ VULVECTOMY SIMPLE PARTIAL N/A 9/10/2021    Procedure: VULVECTOMY SIMPLE   Partial;  Surgeon: Damian Kwong MD;  Location: [facility];  Service: Gynecology Oncology    NJ XCAPSL CTRC RMVL INSJ IO LENS PROSTH W/O ECP Left 2/9/2021    Procedure: PHACO W/IOL & LRI;  Surgeon: Vivi Bradford MD;  Location:  MAIN OR;  Service: Ophthalmology    NJ XCAPSL CTRC RMVL INSJ IO LENS PROSTH W/O ECP Right 2/23/2021    Procedure: PHACO W/IOL & LRI;  Surgeon: Vivi Bradford MD;  Location:  MAIN OR;  Service: Ophthalmology       OB History    No obstetric history on file.         No family history on file.    The following portions of the patient's history were reviewed and updated as appropriate: allergies, current medications, past family history, past medical history, past social history, past surgical history, and problem list.      Objective:    Blood pressure 150/84, pulse 81, temperature 97.7 °F (36.5 °C), temperature source Temporal, weight 121 kg (266 lb), SpO2 97%, not currently " "breastfeeding.  Body mass index is 45.66 kg/m².    Physical Exam  Constitutional:       Appearance: She is well-developed.   HENT:      Head: Normocephalic and atraumatic.   Eyes:      Pupils: Pupils are equal, round, and reactive to light.   Cardiovascular:      Rate and Rhythm: Normal rate and regular rhythm.      Heart sounds: Normal heart sounds.   Pulmonary:      Effort: Pulmonary effort is normal. No respiratory distress.      Breath sounds: Normal breath sounds.   Abdominal:      General: Bowel sounds are normal. There is no distension.      Palpations: Abdomen is soft. Abdomen is not rigid.      Tenderness: There is no abdominal tenderness. There is no guarding or rebound.   Genitourinary:     Comments: -Normal external female genitalia, status post simple partial vulvectomy without evidence of recurrence of disease.  Normal Bartholin's and Castleford's glands                  -Normal midline urethral meatus. No lesions notes                  Introitus does not allow placement of speculum.  Musculoskeletal:         General: Normal range of motion.      Cervical back: Normal range of motion and neck supple.   Lymphadenopathy:      Cervical: No cervical adenopathy.      Upper Body:      Right upper body: No supraclavicular adenopathy.      Left upper body: No supraclavicular adenopathy.   Skin:     General: Skin is warm and dry.   Neurological:      Mental Status: She is alert and oriented to person, place, and time.   Psychiatric:         Behavior: Behavior normal.           No results found for: \"\"  Lab Results   Component Value Date    WBC 7.56 01/27/2022    HGB 14.2 01/27/2022    HCT 43.0 01/27/2022    MCV 91 01/27/2022     01/27/2022     Lab Results   Component Value Date    K 3.9 09/15/2023     09/15/2023    CO2 28 09/15/2023    BUN 11 09/15/2023    CREATININE 0.66 09/15/2023    GLUF 113 (H) 01/27/2022    CALCIUM 9.6 09/15/2023    AST 29 09/15/2023    ALT 47 09/15/2023    ALKPHOS 48 " "09/15/2023    EGFR 103 09/15/2023        Trend:  No results found for: \"\"     "

## 2024-02-01 NOTE — ASSESSMENT & PLAN NOTE
Stable without evidence of recurrence of disease.  Patient will follow-up every 6 months for repeat evaluation.

## 2024-02-05 ENCOUNTER — TELEPHONE (OUTPATIENT)
Dept: GYNECOLOGIC ONCOLOGY | Facility: CLINIC | Age: 59
End: 2024-02-05

## 2024-02-06 ENCOUNTER — TELEPHONE (OUTPATIENT)
Dept: GYNECOLOGIC ONCOLOGY | Facility: CLINIC | Age: 59
End: 2024-02-06

## 2024-02-16 ENCOUNTER — ANESTHESIA EVENT (OUTPATIENT)
Dept: PERIOP | Facility: HOSPITAL | Age: 59
End: 2024-02-16
Payer: COMMERCIAL

## 2024-02-16 ENCOUNTER — TELEPHONE (OUTPATIENT)
Dept: HEMATOLOGY ONCOLOGY | Facility: CLINIC | Age: 59
End: 2024-02-16

## 2024-02-16 NOTE — TELEPHONE ENCOUNTER
Patient Call    Who are you speaking with? Patient    If it is not the patient, are they listed on an active communication consent form? N/A   What is the reason for this call? She asked to speak to surgery coordinator   Does this require a call back? Yes   If a call back is required, please list best call back number [phone]    If a call back is required, advise that a message will be forwarded to their care team and someone will return their call as soon as possible.   Did you relay this information to the patient? Yes

## 2024-03-08 ENCOUNTER — TELEPHONE (OUTPATIENT)
Dept: HEMATOLOGY ONCOLOGY | Facility: CLINIC | Age: 59
End: 2024-03-08

## 2024-03-08 NOTE — TELEPHONE ENCOUNTER
Patient Call    Who are you speaking with? Patient    If it is not the patient, are they listed on an active communication consent form? N/A   What is the reason for this call? Pt is calling to speak to the  at Dr Kwong's office. Pt states this call is in regards to one of the employees in the office. Pt would like a call back today if possible.    Does this require a call back? Yes   If a call back is required, please list best call back number [phone]   If a call back is required, advise that a message will be forwarded to their care team and someone will return their call as soon as possible.   Did you relay this information to the patient? Yes

## 2024-03-12 ENCOUNTER — TELEPHONE (OUTPATIENT)
Dept: HEMATOLOGY ONCOLOGY | Facility: CLINIC | Age: 59
End: 2024-03-12

## 2024-03-12 NOTE — TELEPHONE ENCOUNTER
Patient Call    Who are you speaking with? Patient    If it is not the patient, are they listed on an active communication consent form? N/A   What is the reason for this call? Patient called back again, stating if she hasn't gotten a call back from this morning to give us another call again. She still has not gotten a return call and this is regarding an important matter and would like a call back by the end of today to help handle the situation    Does this require a call back? Yes   If a call back is required, please list best call back number [phone]   If a call back is required, advise that a message will be forwarded to their care team and someone will return their call as soon as possible.   Did you relay this information to the patient? Yes

## 2024-03-12 NOTE — TELEPHONE ENCOUNTER
I called patient back. I apologized for the delayed response back to her. Patient was upset because she has spoke with Abdiel a few times now, 2/16, 2/23, & 3/7 (called her on her TEAMS number) requesting a Jury Duty excuse since she is having surgery with Dr. Kwong a few days prior to her Jury Duty Summons. She still has not received this excuse that she has requested.    I looked in patient's chart, I don't see any documentation regarding a Jury Duty excuse or any communication with Abdiel. I informed patient I will take care of make sure she receives a Jury Duty excuse. Patient now has my person TEAMS number to call me. She will call me with her jury duty date, she is pretty sure it is 4/16, will call me back to confirm. I told her I would fed-ex the excuse to her so she would receive it over night. I apologized again for the delay in her request, she appreciated the call back.

## 2024-03-13 NOTE — TELEPHONE ENCOUNTER
Patient called back this morning to confirm the Jury Duty Summons date is 4/16.     I confirmed patient's address, informed her that the letter would be fed-ed to her and should receive tomorrow. I requested for patient to call me tomorrow if hasn't received it by EOD.       FED EX Tracking Number- [id number]

## 2024-03-15 ENCOUNTER — APPOINTMENT (OUTPATIENT)
Dept: LAB | Facility: HOSPITAL | Age: 59
End: 2024-03-15
Payer: COMMERCIAL

## 2024-03-15 ENCOUNTER — APPOINTMENT (OUTPATIENT)
Dept: RADIOLOGY | Facility: MEDICAL CENTER | Age: 59
End: 2024-03-15
Payer: COMMERCIAL

## 2024-03-15 ENCOUNTER — LAB REQUISITION (OUTPATIENT)
Dept: LAB | Facility: HOSPITAL | Age: 59
End: 2024-03-15
Payer: COMMERCIAL

## 2024-03-15 ENCOUNTER — TELEPHONE (OUTPATIENT)
Dept: GYNECOLOGIC ONCOLOGY | Facility: CLINIC | Age: 59
End: 2024-03-15

## 2024-03-15 ENCOUNTER — APPOINTMENT (OUTPATIENT)
Dept: LAB | Facility: MEDICAL CENTER | Age: 59
End: 2024-03-15
Payer: COMMERCIAL

## 2024-03-15 DIAGNOSIS — R87.612 PAPANICOLAOU SMEAR OF CERVIX WITH LOW GRADE SQUAMOUS INTRAEPITHELIAL LESION (LGSIL): ICD-10-CM

## 2024-03-15 DIAGNOSIS — R87.612 LGSIL ON PAP SMEAR OF CERVIX: ICD-10-CM

## 2024-03-15 DIAGNOSIS — R87.612 LOW GRADE SQUAMOUS INTRAEPITHELIAL LESION ON CYTOLOGIC SMEAR OF CERVIX (LGSIL): ICD-10-CM

## 2024-03-15 LAB
ABO GROUP BLD: NORMAL
ANION GAP SERPL CALCULATED.3IONS-SCNC: 11 MMOL/L (ref 4–13)
ATRIAL RATE: 77 BPM
BASOPHILS # BLD AUTO: 0.05 THOUSANDS/ÂΜL (ref 0–0.1)
BASOPHILS NFR BLD AUTO: 1 % (ref 0–1)
BLD GP AB SCN SERPL QL: NEGATIVE
BUN SERPL-MCNC: 8 MG/DL (ref 5–25)
CALCIUM SERPL-MCNC: 9.6 MG/DL (ref 8.4–10.2)
CHLORIDE SERPL-SCNC: 103 MMOL/L (ref 96–108)
CO2 SERPL-SCNC: 27 MMOL/L (ref 21–32)
CREAT SERPL-MCNC: 0.63 MG/DL (ref 0.6–1.3)
EOSINOPHIL # BLD AUTO: 0.2 THOUSAND/ÂΜL (ref 0–0.61)
EOSINOPHIL NFR BLD AUTO: 2 % (ref 0–6)
ERYTHROCYTE [DISTWIDTH] IN BLOOD BY AUTOMATED COUNT: 13.4 % (ref 11.6–15.1)
GFR SERPL CREATININE-BSD FRML MDRD: 100 ML/MIN/1.73SQ M
GLUCOSE SERPL-MCNC: 129 MG/DL (ref 65–140)
HCT VFR BLD AUTO: 44.9 % (ref 34.8–46.1)
HGB BLD-MCNC: 15.3 G/DL (ref 11.5–15.4)
IMM GRANULOCYTES # BLD AUTO: 0.03 THOUSAND/UL (ref 0–0.2)
IMM GRANULOCYTES NFR BLD AUTO: 0 % (ref 0–2)
LYMPHOCYTES # BLD AUTO: 2.24 THOUSANDS/ÂΜL (ref 0.6–4.47)
LYMPHOCYTES NFR BLD AUTO: 27 % (ref 14–44)
MCH RBC QN AUTO: 30.1 PG (ref 26.8–34.3)
MCHC RBC AUTO-ENTMCNC: 34.1 G/DL (ref 31.4–37.4)
MCV RBC AUTO: 88 FL (ref 82–98)
MONOCYTES # BLD AUTO: 0.62 THOUSAND/ÂΜL (ref 0.17–1.22)
MONOCYTES NFR BLD AUTO: 8 % (ref 4–12)
NEUTROPHILS # BLD AUTO: 5.03 THOUSANDS/ÂΜL (ref 1.85–7.62)
NEUTS SEG NFR BLD AUTO: 62 % (ref 43–75)
NRBC BLD AUTO-RTO: 0 /100 WBCS
P AXIS: 31 DEGREES
PLATELET # BLD AUTO: 317 THOUSANDS/UL (ref 149–390)
PMV BLD AUTO: 9.8 FL (ref 8.9–12.7)
POTASSIUM SERPL-SCNC: 3.7 MMOL/L (ref 3.5–5.3)
PR INTERVAL: 166 MS
QRS AXIS: -58 DEGREES
QRSD INTERVAL: 120 MS
QT INTERVAL: 422 MS
QTC INTERVAL: 477 MS
RBC # BLD AUTO: 5.08 MILLION/UL (ref 3.81–5.12)
RH BLD: NEGATIVE
SODIUM SERPL-SCNC: 141 MMOL/L (ref 135–147)
SPECIMEN EXPIRATION DATE: NORMAL
T WAVE AXIS: 10 DEGREES
VENTRICULAR RATE: 77 BPM
WBC # BLD AUTO: 8.17 THOUSAND/UL (ref 4.31–10.16)

## 2024-03-15 PROCEDURE — 85025 COMPLETE CBC W/AUTO DIFF WBC: CPT

## 2024-03-15 PROCEDURE — 86900 BLOOD TYPING SEROLOGIC ABO: CPT | Performed by: OBSTETRICS & GYNECOLOGY

## 2024-03-15 PROCEDURE — 71046 X-RAY EXAM CHEST 2 VIEWS: CPT

## 2024-03-15 PROCEDURE — 36415 COLL VENOUS BLD VENIPUNCTURE: CPT

## 2024-03-15 PROCEDURE — 86901 BLOOD TYPING SEROLOGIC RH(D): CPT | Performed by: OBSTETRICS & GYNECOLOGY

## 2024-03-15 PROCEDURE — 86850 RBC ANTIBODY SCREEN: CPT | Performed by: OBSTETRICS & GYNECOLOGY

## 2024-03-15 PROCEDURE — 80048 BASIC METABOLIC PNL TOTAL CA: CPT

## 2024-03-15 NOTE — TELEPHONE ENCOUNTER
Patient called to give an FYI, she was instructed to go to [facility] for her labs and EKG for pre-op testing. When she went there, she was able to have her labs completed, but that location no longer performs EKG's. She was instructed to go to [facility] for the EKG. I appreciated Pratima for letting me know and will pass this information onto the team so they are giving out correct information.

## 2024-03-28 ENCOUNTER — TELEPHONE (OUTPATIENT)
Age: 59
End: 2024-03-28

## 2024-03-28 ENCOUNTER — TELEPHONE (OUTPATIENT)
Dept: HEMATOLOGY ONCOLOGY | Facility: CLINIC | Age: 59
End: 2024-03-28

## 2024-03-28 DIAGNOSIS — B37.31 VULVAR CANDIDIASIS: ICD-10-CM

## 2024-03-28 RX ORDER — CLOTRIMAZOLE AND BETAMETHASONE DIPROPIONATE 10; .64 MG/G; MG/G
CREAM TOPICAL 2 TIMES DAILY
Qty: 30 G | Refills: 1 | Status: SHIPPED | OUTPATIENT
Start: 2024-03-28 | End: 2024-04-04

## 2024-03-28 NOTE — TELEPHONE ENCOUNTER
Left message for patient to call back regarding being cleared for surgery. Left TEAMS # [phone]

## 2024-03-28 NOTE — TELEPHONE ENCOUNTER
Patient Call    Who are you speaking with? Patient    If it is not the patient, are they listed on an active communication consent form? N/A   What is the reason for this call? She wanted to make sure she was cleared for surgery. She also asked for a script to lotrisone   Does this require a call back? Yes   If a call back is required, please list best call back number [phone]    If a call back is required, advise that a message will be forwarded to their care team and someone will return their call as soon as possible.   Did you relay this information to the patient? Yes

## 2024-04-03 NOTE — PRE-PROCEDURE INSTRUCTIONS
Pre-Surgery Instructions:   Medication Instructions    amLODIPine (NORVASC) 10 mg tablet Take day of surgery.    aspirin-acetaminophen-caffeine (EXCEDRIN MIGRAINE) 250-250-65 MG per tablet Stop taking 7 days prior to surgery.    cholecalciferol (VITAMIN D3) 1,000 units tablet Stop taking 7 days prior to surgery.    clotrimazole-betamethasone (LOTRISONE) 1-0.05 % cream Hold day of surgery.    estradiol (ESTRACE VAGINAL) 0.1 mg/g vaginal cream Hold day of surgery.    Flaxseed, Linseed, (Flaxseed Oil) 1000 MG CAPS Stop taking 7 days prior to surgery.    lovastatin (MEVACOR) 40 MG tablet Take night before surgery    NON FORMULARY Uses PRN- DO NOT take day of surgery    NON FORMULARY Hold day of surgery.    Omega-3 Fatty Acids (Fish Oil) 1200 MG CPDR Stop taking 7 days prior to surgery.    PreviDent 5000 Dry Mouth 1.1 % GEL Take day of surgery.    QUEtiapine (SEROquel) 400 MG tablet Take night before surgery    Restasis 0.05 % ophthalmic emulsion Take day of surgery.    SUMAtriptan (IMITREX) 100 mg tablet Uses PRN- OK to take day of surgery    venlafaxine (EFFEXOR-XR) 150 mg 24 hr capsule Take day of surgery.    zaleplon (SONATA) 10 MG capsule Take night before surgery   Medication instructions for day surgery reviewed. Please use only a sip of water to take your instructed medications. Avoid all over the counter vitamins, supplements and NSAIDS for one week prior to surgery per anesthesia guidelines. Tylenol is ok to take as needed.     You will receive a call one business day prior to surgery with an arrival time and hospital directions. If your surgery is scheduled on a Monday, the hospital will be calling you on the Friday prior to your surgery. If you have not heard from anyone by 8pm, please call the hospital supervisor through the hospital  at [phone]. ([place] [phone] or [place] [phone]).    Do not eat or drink anything after midnight the night before your surgery, including candy, mints,  lifesavers, or chewing gum. Do not drink alcohol 24hrs before your surgery. Try not to smoke at least 24hrs before your surgery.       Follow the pre surgery showering instructions as listed in the “My Surgical Experience Booklet” or otherwise provided by your surgeon's office. Do not use a blade to shave the surgical area 1 week before surgery. It is okay to use a clean electric clippers up to 24 hours before surgery. Do not apply any lotions, creams, including makeup, cologne, deodorant, or perfumes after showering on the day of your surgery. Do not use dry shampoo, hair spray, hair gel, or any type of hair products.     No contact lenses, eye make-up, or artificial eyelashes. Remove nail polish, including gel polish, and any artificial, gel, or acrylic nails if possible. Remove all jewelry including rings and body piercing jewelry.     Wear causal clothing that is easy to take on and off. Consider your type of surgery.    Keep any valuables, jewelry, piercings at home. Please bring any specially ordered equipment (sling, braces) if indicated.    Arrange for a responsible person to drive you to and from the hospital on the day of your surgery. Please confirm the visitor policy for the day of your procedure when you receive your phone call with an arrival time.     Call the surgeon's office with any new illnesses, exposures, or additional questions prior to surgery.    Please reference your “My Surgical Experience Booklet” for additional information to prepare for your upcoming surgery.

## 2024-04-11 RX ORDER — IBUPROFEN 600 MG/1
600 TABLET ORAL EVERY 6 HOURS PRN
Qty: 30 TABLET | Refills: 0 | Status: SHIPPED | OUTPATIENT
Start: 2024-04-11

## 2024-04-11 RX ORDER — SENNOSIDES 8.6 MG
650 CAPSULE ORAL EVERY 8 HOURS PRN
Start: 2024-04-11

## 2024-04-11 NOTE — DISCHARGE INSTR - AVS FIRST PAGE
[facility] - Gynecologic Oncology  Stephanie Shine Boulay and Julien  [phone]    Please take the following medications:   Alternate tylenol, motrin    2. Activity restrictions:  Nothing in the vagina until your follow-up visit  No tub baths or swimming    3. You may shower.     4. If you have heavy vaginal bleeding soaking through more than 1 pad per hour, fever with temperature > 100.4F or 38C, nausea/vomiting, or severe abdominal pain, please call the office or present to the emergency room.    5. You have a follow-up appointment listed below.

## 2024-04-12 ENCOUNTER — ANESTHESIA (OUTPATIENT)
Dept: PERIOP | Facility: HOSPITAL | Age: 59
End: 2024-04-12
Payer: COMMERCIAL

## 2024-04-12 ENCOUNTER — HOSPITAL ENCOUNTER (OUTPATIENT)
Facility: HOSPITAL | Age: 59
Setting detail: OUTPATIENT SURGERY
Discharge: HOME | End: 2024-04-12
Attending: OBSTETRICS & GYNECOLOGY | Admitting: OBSTETRICS & GYNECOLOGY
Payer: COMMERCIAL

## 2024-04-12 VITALS
RESPIRATION RATE: 16 BRPM | TEMPERATURE: 97.9 F | DIASTOLIC BLOOD PRESSURE: 81 MMHG | SYSTOLIC BLOOD PRESSURE: 178 MMHG | WEIGHT: 264.33 LBS | OXYGEN SATURATION: 94 % | HEIGHT: 64 IN | BODY MASS INDEX: 45.13 KG/M2 | HEART RATE: 94 BPM

## 2024-04-12 DIAGNOSIS — G89.18 POSTOPERATIVE PAIN: ICD-10-CM

## 2024-04-12 DIAGNOSIS — R87.612 LGSIL ON PAP SMEAR OF CERVIX: Primary | ICD-10-CM

## 2024-04-12 PROCEDURE — 57454 BX/CURETT OF CERVIX W/SCOPE: CPT | Performed by: OBSTETRICS & GYNECOLOGY

## 2024-04-12 PROCEDURE — 88305 TISSUE EXAM BY PATHOLOGIST: CPT | Performed by: PATHOLOGY

## 2024-04-12 PROCEDURE — NC001 PR NO CHARGE: Performed by: OBSTETRICS & GYNECOLOGY

## 2024-04-12 RX ORDER — ONDANSETRON 2 MG/ML
4 INJECTION INTRAMUSCULAR; INTRAVENOUS ONCE AS NEEDED
Status: DISCONTINUED | OUTPATIENT
Start: 2024-04-12 | End: 2024-04-12 | Stop reason: HOSPADM

## 2024-04-12 RX ORDER — DEXAMETHASONE SODIUM PHOSPHATE 10 MG/ML
INJECTION, SOLUTION INTRAMUSCULAR; INTRAVENOUS AS NEEDED
Status: DISCONTINUED | OUTPATIENT
Start: 2024-04-12 | End: 2024-04-12

## 2024-04-12 RX ORDER — BUPIVACAINE HYDROCHLORIDE 5 MG/ML
INJECTION, SOLUTION EPIDURAL; INTRACAUDAL AS NEEDED
Status: DISCONTINUED | OUTPATIENT
Start: 2024-04-12 | End: 2024-04-12 | Stop reason: HOSPADM

## 2024-04-12 RX ORDER — FENTANYL CITRATE 50 UG/ML
INJECTION, SOLUTION INTRAMUSCULAR; INTRAVENOUS AS NEEDED
Status: DISCONTINUED | OUTPATIENT
Start: 2024-04-12 | End: 2024-04-12

## 2024-04-12 RX ORDER — PROPOFOL 10 MG/ML
INJECTION, EMULSION INTRAVENOUS CONTINUOUS PRN
Status: DISCONTINUED | OUTPATIENT
Start: 2024-04-12 | End: 2024-04-12

## 2024-04-12 RX ORDER — PROPOFOL 10 MG/ML
INJECTION, EMULSION INTRAVENOUS AS NEEDED
Status: DISCONTINUED | OUTPATIENT
Start: 2024-04-12 | End: 2024-04-12

## 2024-04-12 RX ORDER — DIPHENHYDRAMINE HYDROCHLORIDE 50 MG/ML
INJECTION INTRAMUSCULAR; INTRAVENOUS AS NEEDED
Status: DISCONTINUED | OUTPATIENT
Start: 2024-04-12 | End: 2024-04-12

## 2024-04-12 RX ORDER — OXYCODONE HYDROCHLORIDE 5 MG/1
5 TABLET ORAL EVERY 4 HOURS PRN
Status: DISCONTINUED | OUTPATIENT
Start: 2024-04-12 | End: 2024-04-12 | Stop reason: HOSPADM

## 2024-04-12 RX ORDER — LIDOCAINE HYDROCHLORIDE 10 MG/ML
INJECTION, SOLUTION EPIDURAL; INFILTRATION; INTRACAUDAL; PERINEURAL AS NEEDED
Status: DISCONTINUED | OUTPATIENT
Start: 2024-04-12 | End: 2024-04-12

## 2024-04-12 RX ORDER — ACETAMINOPHEN 325 MG/1
975 TABLET ORAL EVERY 6 HOURS PRN
Status: DISCONTINUED | OUTPATIENT
Start: 2024-04-12 | End: 2024-04-12 | Stop reason: HOSPADM

## 2024-04-12 RX ORDER — OXYCODONE HYDROCHLORIDE 5 MG/1
TABLET ORAL
Qty: 5 TABLET | Refills: 0 | Status: SHIPPED | OUTPATIENT
Start: 2024-04-12

## 2024-04-12 RX ORDER — ACETIC ACID 5 %
LIQUID (ML) MISCELLANEOUS AS NEEDED
Status: DISCONTINUED | OUTPATIENT
Start: 2024-04-12 | End: 2024-04-12 | Stop reason: HOSPADM

## 2024-04-12 RX ORDER — HYDROMORPHONE HCL/PF 1 MG/ML
0.2 SYRINGE (ML) INJECTION
Status: DISCONTINUED | OUTPATIENT
Start: 2024-04-12 | End: 2024-04-12 | Stop reason: HOSPADM

## 2024-04-12 RX ORDER — KETOROLAC TROMETHAMINE 30 MG/ML
INJECTION, SOLUTION INTRAMUSCULAR; INTRAVENOUS AS NEEDED
Status: DISCONTINUED | OUTPATIENT
Start: 2024-04-12 | End: 2024-04-12

## 2024-04-12 RX ORDER — SODIUM CHLORIDE 9 MG/ML
125 INJECTION, SOLUTION INTRAVENOUS CONTINUOUS
Status: DISCONTINUED | OUTPATIENT
Start: 2024-04-12 | End: 2024-04-12 | Stop reason: HOSPADM

## 2024-04-12 RX ORDER — MIDAZOLAM HYDROCHLORIDE 2 MG/2ML
INJECTION, SOLUTION INTRAMUSCULAR; INTRAVENOUS AS NEEDED
Status: DISCONTINUED | OUTPATIENT
Start: 2024-04-12 | End: 2024-04-12

## 2024-04-12 RX ORDER — ONDANSETRON 2 MG/ML
INJECTION INTRAMUSCULAR; INTRAVENOUS AS NEEDED
Status: DISCONTINUED | OUTPATIENT
Start: 2024-04-12 | End: 2024-04-12

## 2024-04-12 RX ADMIN — FENTANYL CITRATE 50 MCG: 50 INJECTION INTRAMUSCULAR; INTRAVENOUS at 08:53

## 2024-04-12 RX ADMIN — FENTANYL CITRATE 50 MCG: 50 INJECTION INTRAMUSCULAR; INTRAVENOUS at 08:44

## 2024-04-12 RX ADMIN — PROPOFOL 200 MG: 10 INJECTION, EMULSION INTRAVENOUS at 08:44

## 2024-04-12 RX ADMIN — PROPOFOL 70 MCG/KG/MIN: 10 INJECTION, EMULSION INTRAVENOUS at 08:44

## 2024-04-12 RX ADMIN — DIPHENHYDRAMINE HYDROCHLORIDE 12.5 MG: 50 INJECTION, SOLUTION INTRAMUSCULAR; INTRAVENOUS at 08:58

## 2024-04-12 RX ADMIN — LIDOCAINE HYDROCHLORIDE 80 MG: 10 INJECTION, SOLUTION EPIDURAL; INFILTRATION; INTRACAUDAL; PERINEURAL at 08:44

## 2024-04-12 RX ADMIN — DEXAMETHASONE SODIUM PHOSPHATE 10 MG: 10 INJECTION INTRAMUSCULAR; INTRAVENOUS at 08:51

## 2024-04-12 RX ADMIN — SODIUM CHLORIDE 125 ML/HR: 0.9 INJECTION, SOLUTION INTRAVENOUS at 07:52

## 2024-04-12 RX ADMIN — MIDAZOLAM 2 MG: 1 INJECTION INTRAMUSCULAR; INTRAVENOUS at 08:37

## 2024-04-12 RX ADMIN — KETOROLAC TROMETHAMINE 30 MG: 30 INJECTION, SOLUTION INTRAMUSCULAR; INTRAVENOUS at 09:23

## 2024-04-12 RX ADMIN — ONDANSETRON 4 MG: 2 INJECTION INTRAMUSCULAR; INTRAVENOUS at 08:51

## 2024-04-12 NOTE — H&P
Assessment/Plan:     Problem List Items Addressed This Visit         AME III (vulvar intraepithelial neoplasia III)       Stable without evidence of recurrence of disease.  Patient will follow-up every 6 months for repeat evaluation.           LGSIL on Pap smear of cervix - Primary       Patient has a history of low-grade TYSON of cervix.  The Pap smear however was achieved through a narrow introitus and may not actually represent cervix.  We have discussed with the patient options.  She is unable to undergo speculum exam in the office due to narrowed introitus.  The patient has however had a high-grade lesion of the external genitalia and would likely be best off undergoing colposcopy of vagina and cervix.     We discussed doing this under anesthesia which would likely require episiotomy to allow the speculum to fit.  The patient is in favor of moving forward to this.     We have therefore recommended episiotomy with colposcopy of vagina and cervix and possible cervical biopsies.  We discussed with the patient risks and benefits of the procedure including bleeding requiring transfusion infection damage to local structures including episiotomy sutures.  The patient understands accepts the risks of surgery and has signed an informed consent.  Preoperative testing has been ordered.  Consent was obtained.     Patient will follow-up for evaluation in the next few weeks.           Relevant Orders     Case request operating room: COLPOSCOPY and episiotomy (Completed)     Type and screen     Basic metabolic panel (Completed)     CBC and differential (Completed)     EKG 12 lead (Completed)     XR chest pa & lateral (Completed)      Other Visit Diagnoses         Yeast infection                          CHIEF COMPLAINT: Abnormal Pap        Subjective:      Problem:  Cancer Staging   No matching staging information was found for the patient.        Previous therapy:      Oncology History     No history exists.            Patient  "ID: Pratima Kline is a 58 y.o. female  Patient is a very pleasant 58-year-old female with a history of simple partial vulvectomy for AME 3.  Patient has introital narrowing and recently underwent Pap smear which reveals low-grade TYSON.  The patient is also HPV 16 positive.  The patient has significant vaginal stenosis and was unable to undergo evaluation in the office.  She presents today in follow-up.     The patient does note some vulvar itching but is otherwise without complaint.  Today, the patient is doing well.  She denies significant abdominal pain, pelvic pain, nausea, vomiting, constipation, diarrhea, fevers, chills, or vaginal bleeding.              Review of Systems   Constitutional: Negative.    HENT: Negative.     Eyes: Negative.    Respiratory: Negative.     Cardiovascular: Negative.    Gastrointestinal: Negative.    Endocrine: Negative.    Genitourinary: Negative.    Musculoskeletal: Negative.    Skin: Negative.    Neurological: Negative.    Hematological: Negative.    Psychiatric/Behavioral: Negative.           Current Medications   No current facility-administered medications for this visit.             Current Outpatient Medications   Medication Sig Dispense Refill    acetaminophen (TYLENOL) 650 mg CR tablet Take 1 tablet (650 mg total) by mouth every 8 (eight) hours as needed for mild pain        ibuprofen (MOTRIN) 600 mg tablet Take 1 tablet (600 mg total) by mouth every 6 (six) hours as needed for mild pain 30 tablet 0                Facility-Administered Medications Ordered in Other Visits   Medication Dose Route Frequency Provider Last Rate Last Admin    sodium chloride 0.9 % infusion  125 mL/hr Intravenous Continuous Karen Collins MD                      Allergies   Allergen Reactions    Fentanyl Vomiting       restlessness         Medical History        Past Medical History:   Diagnosis Date    Abnormal Pap smear of cervix      Anxiety      Bipolar disorder (HCC)       pt reports \"doesn't " "feel she has it\"    Colon polyp      CPAP (continuous positive airway pressure) dependence      Depression      Glaucoma      HPV (human papilloma virus) infection      Hyperlipidemia      Hypertension      Knee pain, bilateral       occas    Migraines      PONV (postoperative nausea and vomiting)      Prediabetes      Rosacea      Shortness of breath       not new per pt \"usually going up stairs\"    Sleep apnea      Wears glasses       reading            Surgical History         Past Surgical History:   Procedure Laterality Date    CATARACT EXTRACTION Bilateral      COLONOSCOPY        EXAMINATION UNDER ANESTHESIA N/A 2/1/2022     Procedure: EXAM UNDER ANESTHESIA (EUA);  Surgeon: Silvina Bazan DO;  Location: [facility];  Service: Gynecology    OH COLPOSCOPY CERVIX BX CERVIX & ENDOCRV CURRETAGE N/A 2/1/2022     Procedure: COLPOSCOPY, ENDOMETRIAL BIOPSY;  LEEP;  Surgeon: Silvina Bazan DO;  Location: [facility];  Service: Gynecology    OH VULVECTOMY SIMPLE PARTIAL N/A 9/10/2021     Procedure: VULVECTOMY SIMPLE   Partial;  Surgeon: Damian Kwong MD;  Location: [facility];  Service: Gynecology Oncology    OH XCAPSL CTRC RMVL INSJ IO LENS PROSTH W/O ECP Left 2/9/2021     Procedure: PHACO W/IOL & LRI;  Surgeon: Vivi Bradford MD;  Location:  MAIN OR;  Service: Ophthalmology    OH XCAPSL CTRC RMVL INSJ IO LENS PROSTH W/O ECP Right 2/23/2021     Procedure: PHACO W/IOL & LRI;  Surgeon: Vivi Bradford MD;  Location:  [facility] OR;  Service: Ophthalmology            OB History    No obstetric history on file.            Family History   History reviewed. No pertinent family history.        The following portions of the patient's history were reviewed and updated as appropriate: allergies, current medications, past family history, past medical history, past social history, past surgical history, and problem list.        Objective:     Blood pressure 150/84, pulse 81, temperature 97.7 °F (36.5 °C), temperature source Temporal, weight " "121 kg (266 lb), SpO2 97%, not currently breastfeeding.  Body mass index is 45.66 kg/m².     Physical Exam  Constitutional:       Appearance: She is well-developed.   HENT:      Head: Normocephalic and atraumatic.   Eyes:      Pupils: Pupils are equal, round, and reactive to light.   Cardiovascular:      Rate and Rhythm: Normal rate and regular rhythm.      Heart sounds: Normal heart sounds.   Pulmonary:      Effort: Pulmonary effort is normal. No respiratory distress.      Breath sounds: Normal breath sounds.   Abdominal:      General: Bowel sounds are normal. There is no distension.      Palpations: Abdomen is soft. Abdomen is not rigid.      Tenderness: There is no abdominal tenderness. There is no guarding or rebound.   Genitourinary:     Comments: -Normal external female genitalia, status post simple partial vulvectomy without evidence of recurrence of disease.  Normal Bartholin's and [place]'s glands                  -Normal midline urethral meatus. No lesions notes                  Introitus does not allow placement of speculum.  Musculoskeletal:         General: Normal range of motion.      Cervical back: Normal range of motion and neck supple.   Lymphadenopathy:      Cervical: No cervical adenopathy.      Upper Body:      Right upper body: No supraclavicular adenopathy.      Left upper body: No supraclavicular adenopathy.   Skin:     General: Skin is warm and dry.   Neurological:      Mental Status: She is alert and oriented to person, place, and time.   Psychiatric:         Behavior: Behavior normal.               No results found for: \"\"        Lab Results   Component Value Date     WBC 8.17 03/15/2024     HGB 15.3 03/15/2024     HCT 44.9 03/15/2024     MCV 88 03/15/2024      03/15/2024            Lab Results   Component Value Date     K 3.7 03/15/2024      03/15/2024     CO2 27 03/15/2024     BUN 8 03/15/2024     CREATININE 0.63 03/15/2024     GLUF 113 (H) 01/27/2022     CALCIUM 9.6 " 03/15/2024     AST 29 09/15/2023     ALT 47 09/15/2023     ALKPHOS 48 09/15/2023     EGFR 100 03/15/2024

## 2024-04-12 NOTE — INTERVAL H&P NOTE
H&P reviewed. After examining the patient I find no changes in the patients condition since the H&P had been written.    Plan episiotomy with colposcopy and possible biopsy of cervix and upper vagina.  Preop testing stable for surgery.    There were no vitals filed for this visit.

## 2024-04-12 NOTE — ANESTHESIA PREPROCEDURE EVALUATION
Procedure:  EPISIOTOMY, COLPOSCOPY, BIOPSY (Vagina )    Relevant Problems   ANESTHESIA   (+) PONV (postoperative nausea and vomiting)      CARDIO   (+) Essential hypertension   (+) Hyperlipidemia   (+) Migraine      NEURO/PSYCH   (+) Anxiety   (+) Depression   (+) Headache   (+) Migraine   (+) Visual disorder      PULMONARY   (+) Sleep apnea      Other   (+) CPAP (continuous positive airway pressure) dependence   (+) Morbid obesity with BMI of 40.0-44.9, adult (HCC)        Physical Exam    Airway    Mallampati score: II  TM Distance: >3 FB  Neck ROM: full     Dental   No notable dental hx     Cardiovascular  Cardiovascular exam normal    Pulmonary  Pulmonary exam normal     Other Findings  post-pubertal.      Anesthesia Plan  ASA Score- 3     Anesthesia Type- general with ASA Monitors.         Additional Monitors:     Airway Plan: LMA.           Plan Factors-Exercise tolerance (METS): >4 METS.    Chart reviewed.   Existing labs reviewed. Patient summary reviewed.    Patient is not a current smoker.              Induction- intravenous.    Postoperative Plan-     Informed Consent- Anesthetic plan and risks discussed with patient.  I personally reviewed this patient with the CRNA. Discussed and agreed on the Anesthesia Plan with the CRNA..

## 2024-04-12 NOTE — OP NOTE
OPERATIVE REPORT  PATIENT NAME: Pratima Kline    :  1965  MRN: [id number]  Pt Location: [facility] 08    SURGERY DATE: 2024    Surgeons and Role:     * Damian Kwong MD - Primary    Preop Diagnosis:  LGSIL on Pap smear of cervix R87.612    Post-Op Diagnosis Codes:     * LGSIL on Pap smear of cervix [R87.612]    Procedure(s):  EPISIOTOMY. COLPOSCOPY. BIOPSY    Specimen(s):  ID Type Source Tests Collected by Time Destination   1 : ECC Tissue Cervix, Endocervical TISSUE EXAM Damian Kwong MD 2024 0913        Estimated Blood Loss:   Minimal    Drains:  * No LDAs found *    Anesthesia Type:   General    Operative Indications:  LGSIL on Pap smear of cervix R87.612      Operative Findings:  Narrowed introitus requiring operative separation.  Cervix and upper vagina appeared normal on colposcopy..  No acetowhite changes noted.  No neovascularity noted.  No squamocolumnar junction was visualized consistent with the patient's postmenopausal status.  ECC was performed without difficulty.    Complications:   None    Procedure and Technique:  After establishment of adequate general laryngeal mask anesthesia the patient was placed in dorsal lithotomy position.  Timeout procedure was performed.  An exam under anesthesia was performed revealing nonentrance of the Graves speculum into the vagina.  The patient was then prepped and draped in the usual sterile fashion    An episiotomy was created in the posterior vagina with a vaginal and perineal incision.  Hemostasis was assured with electrocautery.  As speculum was placed without difficulty.  The cervix was visualized with the aid of a single-tooth tenaculum.  This acetic acid 5% was placed onto the cervix.  Colposcopy was performed with the aforementioned findings.  Endocervical curettage was performed without difficulty.  Tenaculum was removed.  No bleeding was noted.    The episiotomy was then repaired with interrupted sutures of 2-0 Vicryl and the  vaginal mucosa was reapproximated to the vulvar squamous mucosa to keep the introitus more open.    The patient tolerated procedure without complication sponge instrument counts were correct prior to closure and hemostasis was assured prior to closure.   I was present for the entire procedure.    Patient Disposition:  PACU         SIGNATURE: Damian Kwong MD  DATE: April 12, 2024  TIME: 9:31 AM

## 2024-04-12 NOTE — ANESTHESIA POSTPROCEDURE EVALUATION
"Post-Op Assessment Note    CV Status:  Stable    Pain management: adequate       Mental Status:  Alert and awake   Hydration Status:  Euvolemic   PONV Controlled:  Controlled   Airway Patency:  Patent     Post Op Vitals Reviewed: Yes    No anethesia notable event occurred.    Staff: Anesthesiologist               BP      Temp      Pulse     Resp      SpO2      /80   Pulse 79   Temp 97.8 °F (36.6 °C) (Temporal)   Resp 18   Ht 5' 4\" (1.626 m)   Wt 120 kg (264 lb 5.3 oz)   LMP  (LMP Unknown)   SpO2 93%   BMI 45.37 kg/m²     "

## 2024-04-16 PROCEDURE — 88305 TISSUE EXAM BY PATHOLOGIST: CPT | Performed by: PATHOLOGY
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
## 2024-04-29 ENCOUNTER — OFFICE VISIT (OUTPATIENT)
Dept: GYNECOLOGIC ONCOLOGY | Facility: CLINIC | Age: 59
End: 2024-04-29

## 2024-04-29 VITALS
WEIGHT: 263 LBS | DIASTOLIC BLOOD PRESSURE: 82 MMHG | HEART RATE: 88 BPM | SYSTOLIC BLOOD PRESSURE: 138 MMHG | BODY MASS INDEX: 44.9 KG/M2 | HEIGHT: 64 IN | TEMPERATURE: 97.6 F | OXYGEN SATURATION: 97 %

## 2024-04-29 DIAGNOSIS — D07.1 VIN III (VULVAR INTRAEPITHELIAL NEOPLASIA III): Primary | ICD-10-CM

## 2024-04-29 DIAGNOSIS — R87.612 LGSIL ON PAP SMEAR OF CERVIX: ICD-10-CM

## 2024-04-29 PROCEDURE — 99024 POSTOP FOLLOW-UP VISIT: CPT | Performed by: OBSTETRICS & GYNECOLOGY

## 2024-04-29 NOTE — ASSESSMENT & PLAN NOTE
Patient had negative colposcopy ECC was negative.  Patient can follow-up with Dr. Bazan in 1 year for repeat Pap smear.

## 2024-04-29 NOTE — LETTER
April 29, 2024     Silvina Bazan DO  [address] [address]    Patient: Pratima Kline   YOB: 1965   Date of Visit: 4/29/2024       Dear Dr. Bazan:    Thank you for referring Pratima Kline to me for evaluation. Below are my notes for this consultation.    If you have questions, please do not hesitate to call me. I look forward to following your patient along with you.         Sincerely,        Damian Kwong MD        CC: No Recipients    Damian Kwong MD  4/29/2024  3:56 PM  Incomplete  Assessment/Plan:    Problem List Items Addressed This Visit       AME III (vulvar intraepithelial neoplasia III) - Primary     No evidence of recurrence of disease.  Patient with some pruritus likely related to lichen sclerosus.  She will continue to use steroids as she has been.  She will follow-up with Dr. Bazan for continued management         LGSIL on Pap smear of cervix     Patient had negative colposcopy ECC was negative.  Patient can follow-up with Dr. Bazan in 1 year for repeat Pap smear.              CHIEF COMPLAINT:       Problem:  Cancer Staging   No matching staging information was found for the patient.        Previous therapy:  Oncology History    No history exists.         Patient ID: Pratima Kline is a 58 y.o. female  Patient is a very pleasant 58-year-old female with a history of low-grade TYSON of cervix and inability to get to the cervix due to vulvovaginal narrowing secondary to prior AME 3 surgery.    Patient underwent colposcopy and ECC.  Colposcopy was unremarkable.  ECC revealed the following:  inal Diagnosis  A. Cervix, Endocervical, ECC:  - Scant benign endocervical tissue and squamous epithelium.    Episiotomy and repair were without difficulty.    Today, the patient is doing well.  She denies significant abdominal pain, pelvic pain, nausea, vomiting, constipation, diarrhea, fevers, chills, or vaginal bleeding.           The following portions of the patient's  history were reviewed and updated as appropriate: allergies, current medications, past family history, past medical history, past social history, past surgical history, and problem list.    Review of Systems   Constitutional: Negative.    HENT: Negative.     Eyes: Negative.    Respiratory: Negative.     Cardiovascular: Negative.    Gastrointestinal: Negative.    Endocrine: Negative.    Genitourinary: Negative.    Musculoskeletal: Negative.    Skin: Negative.    Neurological: Negative.    Hematological: Negative.    Psychiatric/Behavioral: Negative.         Current Outpatient Medications   Medication Sig Dispense Refill   • acetaminophen (TYLENOL) 650 mg CR tablet Take 1 tablet (650 mg total) by mouth every 8 (eight) hours as needed for mild pain     • amLODIPine (NORVASC) 10 mg tablet Take 10 mg by mouth daily     • aspirin-acetaminophen-caffeine (EXCEDRIN MIGRAINE) 250-250-65 MG per tablet Take 1 tablet by mouth every 6 (six) hours as needed for headaches      • cholecalciferol (VITAMIN D3) 1,000 units tablet Take 4,000 Units by mouth daily 2,000       • estradiol (ESTRACE VAGINAL) 0.1 mg/g vaginal cream Apply a small amount to introitus twice a week at bedtime 42.5 g 1   • Flaxseed, Linseed, (Flaxseed Oil) 1000 MG CAPS Take 2,000 mg by mouth       • ibuprofen (MOTRIN) 600 mg tablet Take 1 tablet (600 mg total) by mouth every 6 (six) hours as needed for mild pain 30 tablet 0   • lovastatin (MEVACOR) 40 MG tablet Take 40 mg by mouth every evening       • NON FORMULARY 2 capsules as needed Herbal laxative     • NON FORMULARY Vision Essentials supplement      • Omega-3 Fatty Acids (Fish Oil) 1200 MG CPDR Take by mouth     • oxyCODONE (ROXICODONE) 5 immediate release tablet 1 tablet by mouth every 6 hour as needed 5 tablet 0   • PreviDent 5000 Dry Mouth 1.1 % GEL BRUSH ON TEETH TWICE A DAY MORNING AND NIGHT     • QUEtiapine (SEROquel) 400 MG tablet Take 400 mg by mouth daily at bedtime      • Restasis 0.05 % ophthalmic  "emulsion every 12 (twelve) hours     • SUMAtriptan (IMITREX) 100 mg tablet 100 mg once as needed for migraine      • venlafaxine (EFFEXOR-XR) 150 mg 24 hr capsule Take 150 mg by mouth daily     • zaleplon (SONATA) 10 MG capsule Take 10 mg by mouth daily at bedtime as needed As needed      • clotrimazole-betamethasone (LOTRISONE) 1-0.05 % cream Apply topically 2 (two) times a day for 7 days To affected area 30 g 1     No current facility-administered medications for this visit.           Objective:    Blood pressure 138/82, pulse 88, temperature 97.6 °F (36.4 °C), height 5' 4\" (1.626 m), weight 119 kg (263 lb), SpO2 97%, not currently breastfeeding.  Body mass index is 45.14 kg/m².  Body surface area is 2.2 meters squared.    Physical Exam  Constitutional:       Appearance: She is well-developed.   HENT:      Head: Normocephalic and atraumatic.   Eyes:      Pupils: Pupils are equal, round, and reactive to light.   Cardiovascular:      Rate and Rhythm: Normal rate and regular rhythm.      Heart sounds: Normal heart sounds.   Pulmonary:      Effort: Pulmonary effort is normal. No respiratory distress.      Breath sounds: Normal breath sounds.   Abdominal:      General: Bowel sounds are normal. There is no distension.      Palpations: Abdomen is soft. Abdomen is not rigid.      Tenderness: There is no abdominal tenderness. There is no guarding or rebound.   Genitourinary:     Comments: -Normal external female genitalia, healing well status post episiotomy normal Bartholin's and [place]'s glands                  -Normal midline urethral meatus. No lesions notes                  -Bladder without fullness mass or tenderness                  -Vagina without lesion or discharge No significant cystocele or rectocele noted                    Musculoskeletal:         General: Normal range of motion.      Cervical back: Normal range of motion and neck supple.   Lymphadenopathy:      Cervical: No cervical adenopathy.      Upper " "Body:      Right upper body: No supraclavicular adenopathy.      Left upper body: No supraclavicular adenopathy.   Skin:     General: Skin is warm and dry.   Neurological:      Mental Status: She is alert and oriented to person, place, and time.   Psychiatric:         Behavior: Behavior normal.         No results found for: \"\"  Lab Results   Component Value Date    K 3.7 03/15/2024     03/15/2024    CO2 27 03/15/2024    BUN 8 03/15/2024    CREATININE 0.63 03/15/2024    GLUF 113 (H) 01/27/2022    CALCIUM 9.6 03/15/2024    AST 29 09/15/2023    ALT 47 09/15/2023    ALKPHOS 48 09/15/2023    EGFR 100 03/15/2024     Lab Results   Component Value Date    WBC 8.17 03/15/2024    HGB 15.3 03/15/2024    HCT 44.9 03/15/2024    MCV 88 03/15/2024     03/15/2024     Lab Results   Component Value Date    NEUTROABS 5.03 03/15/2024        Trend:  No results found for: \"\"               "

## 2024-04-29 NOTE — ASSESSMENT & PLAN NOTE
No evidence of recurrence of disease.  Patient with some pruritus likely related to lichen sclerosus.  She will continue to use steroids as she has been.  She will follow-up with Dr. Bazan for continued management

## 2024-04-29 NOTE — PROGRESS NOTES
Assessment/Plan:    Problem List Items Addressed This Visit       AME III (vulvar intraepithelial neoplasia III) - Primary     No evidence of recurrence of disease.  Patient with some pruritus likely related to lichen sclerosus.  She will continue to use steroids as she has been.  She will follow-up with Dr. Bazan for continued management         LGSIL on Pap smear of cervix     Patient had negative colposcopy ECC was negative.  Patient can follow-up with Dr. Bazan in 1 year for repeat Pap smear.              CHIEF COMPLAINT:       Problem:  Cancer Staging   No matching staging information was found for the patient.        Previous therapy:  Oncology History    No history exists.         Patient ID: Pratima Kline is a 58 y.o. female  Patient is a very pleasant 58-year-old female with a history of low-grade TYSON of cervix and inability to get to the cervix due to vulvovaginal narrowing secondary to prior AME 3 surgery.    Patient underwent colposcopy and ECC.  Colposcopy was unremarkable.  ECC revealed the following:  inal Diagnosis  A. Cervix, Endocervical, ECC:  - Scant benign endocervical tissue and squamous epithelium.    Episiotomy and repair were without difficulty.    Today, the patient is doing well.  She denies significant abdominal pain, pelvic pain, nausea, vomiting, constipation, diarrhea, fevers, chills, or vaginal bleeding.           The following portions of the patient's history were reviewed and updated as appropriate: allergies, current medications, past family history, past medical history, past social history, past surgical history, and problem list.    Review of Systems   Constitutional: Negative.    HENT: Negative.     Eyes: Negative.    Respiratory: Negative.     Cardiovascular: Negative.    Gastrointestinal: Negative.    Endocrine: Negative.    Genitourinary: Negative.    Musculoskeletal: Negative.    Skin: Negative.    Neurological: Negative.    Hematological: Negative.     Psychiatric/Behavioral: Negative.         Current Outpatient Medications   Medication Sig Dispense Refill    acetaminophen (TYLENOL) 650 mg CR tablet Take 1 tablet (650 mg total) by mouth every 8 (eight) hours as needed for mild pain      amLODIPine (NORVASC) 10 mg tablet Take 10 mg by mouth daily      aspirin-acetaminophen-caffeine (EXCEDRIN MIGRAINE) 250-250-65 MG per tablet Take 1 tablet by mouth every 6 (six) hours as needed for headaches       cholecalciferol (VITAMIN D3) 1,000 units tablet Take 4,000 Units by mouth daily 2,000        estradiol (ESTRACE VAGINAL) 0.1 mg/g vaginal cream Apply a small amount to introitus twice a week at bedtime 42.5 g 1    Flaxseed, Linseed, (Flaxseed Oil) 1000 MG CAPS Take 2,000 mg by mouth        ibuprofen (MOTRIN) 600 mg tablet Take 1 tablet (600 mg total) by mouth every 6 (six) hours as needed for mild pain 30 tablet 0    lovastatin (MEVACOR) 40 MG tablet Take 40 mg by mouth every evening        NON FORMULARY 2 capsules as needed Herbal laxative      NON FORMULARY Vision Essentials supplement       Omega-3 Fatty Acids (Fish Oil) 1200 MG CPDR Take by mouth      oxyCODONE (ROXICODONE) 5 immediate release tablet 1 tablet by mouth every 6 hour as needed 5 tablet 0    PreviDent 5000 Dry Mouth 1.1 % GEL BRUSH ON TEETH TWICE A DAY MORNING AND NIGHT      QUEtiapine (SEROquel) 400 MG tablet Take 400 mg by mouth daily at bedtime       Restasis 0.05 % ophthalmic emulsion every 12 (twelve) hours      SUMAtriptan (IMITREX) 100 mg tablet 100 mg once as needed for migraine       venlafaxine (EFFEXOR-XR) 150 mg 24 hr capsule Take 150 mg by mouth daily      zaleplon (SONATA) 10 MG capsule Take 10 mg by mouth daily at bedtime as needed As needed       clotrimazole-betamethasone (LOTRISONE) 1-0.05 % cream Apply topically 2 (two) times a day for 7 days To affected area 30 g 1     No current facility-administered medications for this visit.           Objective:    Blood pressure 138/82, pulse  "88, temperature 97.6 °F (36.4 °C), height 5' 4\" (1.626 m), weight 119 kg (263 lb), SpO2 97%, not currently breastfeeding.  Body mass index is 45.14 kg/m².  Body surface area is 2.2 meters squared.    Physical Exam  Constitutional:       Appearance: She is well-developed.   HENT:      Head: Normocephalic and atraumatic.   Eyes:      Pupils: Pupils are equal, round, and reactive to light.   Cardiovascular:      Rate and Rhythm: Normal rate and regular rhythm.      Heart sounds: Normal heart sounds.   Pulmonary:      Effort: Pulmonary effort is normal. No respiratory distress.      Breath sounds: Normal breath sounds.   Abdominal:      General: Bowel sounds are normal. There is no distension.      Palpations: Abdomen is soft. Abdomen is not rigid.      Tenderness: There is no abdominal tenderness. There is no guarding or rebound.   Genitourinary:     Comments: -Normal external female genitalia, healing well status post episiotomy normal Bartholin's and [place]'s glands                  -Normal midline urethral meatus. No lesions notes                  -Bladder without fullness mass or tenderness                  -Vagina without lesion or discharge No significant cystocele or rectocele noted                    Musculoskeletal:         General: Normal range of motion.      Cervical back: Normal range of motion and neck supple.   Lymphadenopathy:      Cervical: No cervical adenopathy.      Upper Body:      Right upper body: No supraclavicular adenopathy.      Left upper body: No supraclavicular adenopathy.   Skin:     General: Skin is warm and dry.   Neurological:      Mental Status: She is alert and oriented to person, place, and time.   Psychiatric:         Behavior: Behavior normal.         No results found for: \"\"  Lab Results   Component Value Date    K 3.7 03/15/2024     03/15/2024    CO2 27 03/15/2024    BUN 8 03/15/2024    CREATININE 0.63 03/15/2024    GLUF 113 (H) 01/27/2022    CALCIUM 9.6 03/15/2024    " "AST 29 09/15/2023    ALT 47 09/15/2023    ALKPHOS 48 09/15/2023    EGFR 100 03/15/2024     Lab Results   Component Value Date    WBC 8.17 03/15/2024    HGB 15.3 03/15/2024    HCT 44.9 03/15/2024    MCV 88 03/15/2024     03/15/2024     Lab Results   Component Value Date    NEUTROABS 5.03 03/15/2024        Trend:  No results found for: \"\"              "

## 2024-06-07 ENCOUNTER — TELEPHONE (OUTPATIENT)
Age: 59
End: 2024-06-07

## 2024-06-07 NOTE — TELEPHONE ENCOUNTER
Patient called she had seen Dr. Kwong who had then told her to follow back up with Dr. Bazan. Patient is asking if the pelvic floor physical therapy can be ordered that was discussed previously. Patient can be reached at [phone] once the order is placed.

## 2024-06-10 DIAGNOSIS — N89.5 VAGINAL STENOSIS: Primary | ICD-10-CM

## 2024-07-30 ENCOUNTER — EVALUATION (OUTPATIENT)
Dept: PHYSICAL THERAPY | Facility: REHABILITATION | Age: 59
End: 2024-07-30
Payer: COMMERCIAL

## 2024-07-30 DIAGNOSIS — N89.5 VAGINAL STENOSIS: Primary | ICD-10-CM

## 2024-07-30 DIAGNOSIS — M62.89 PELVIC FLOOR DYSFUNCTION: ICD-10-CM

## 2024-07-30 PROCEDURE — 97161 PT EVAL LOW COMPLEX 20 MIN: CPT | Performed by: PHYSICAL THERAPIST

## 2024-07-30 PROCEDURE — 97530 THERAPEUTIC ACTIVITIES: CPT | Performed by: PHYSICAL THERAPIST

## 2024-07-30 NOTE — PROGRESS NOTES
PT Evaluation     Today's date: 2024  Patient name: Pratima Kline  : 1965  MRN: [id number]  Referring provider: Silvina Bazan DO  Dx:   Encounter Diagnosis     ICD-10-CM    1. Vaginal stenosis  N89.5       2. Pelvic floor dysfunction  M62.89           Start Time: 1235  Stop Time: 1330  Total time in clinic (min): 55 minutes    Assessment  Impairments: abnormal or restricted ROM, impaired balance, impaired physical strength, lacks appropriate home exercise program and poor posture     Assessment details: Patient is a 58 y.o. female presenting to initial examination with chief complaint of vaginal stenosis. Internal assessment was performed following verbal consent from pt.  Functional impairments include vaginal stenosis, urinary incontinence and sensation of incomplete bladder emptying. Physical findings include vaginal soreness noted with pelvic exam. Remaining physical assessment will be completed NV.  Patient would benefit from formal physical therapy to address impairments as detailed, decrease pain, and restore maximal level of function for all home and mobility tasks. Educated patient regarding plan of care and answered all patient questions to patient satisfaction. Thank you for this referral.     PT issued handout for self stretching with focus on external genitalia at perineal body for desensitization. Pt verbalized understanding of this. PT also educated pt on variety of dilator types and sizes and provided information for pt to make an informed decision regarding dilators to purchase within an appropriate price range for her.    Understanding of Dx/Px/POC: good     Prognosis: good    Goals  Short term goals (to be met by 4 weeks)    Pt will perform Kegel consistently prior to cough/sneeze in 6 weeks.  Pt will void with feeling of complete emptying with intervoid interval of 2 hours or more    Long Term Goals (to be met by discharge    -Pt will be independent with HEP  -Pt will be able to  "tolerate speculum for well woman exam      Plan  Patient would benefit from: skilled physical therapy  Planned modality interventions: biofeedback, cryotherapy and thermotherapy: hydrocollator packs    Planned therapy interventions: joint mobilization, manual therapy, neuromuscular re-education, patient/caregiver education, strengthening, stretching, therapeutic activities, therapeutic exercise, abdominal trunk stabilization, balance, flexibility and home exercise program    Frequency: 1-2x week  Duration in weeks: 12  Treatment plan discussed with: patient  Plan details: Cont per POC. Assess response to external self stretching. Perform objective assessment NV. Follow up regarding purchase of dilator.     PT Pelvic Floor Subjective:   History of Present Illness:   From physician note in Feb 2024 \"AME III (vulvar intraepithelial neoplasia III)         Stable without evidence of recurrence of disease.  Patient will follow-up every 6 months for repeat evaluation.         LGSIL on Pap smear of cervix - Primary        Patient has a history of low-grade TYSON of cervix.  The Pap smear however was achieved through a narrow introitus and may not actually represent cervix.  We have discussed with the patient options.  She is unable to undergo speculum exam in the office due to narrowed introitus.  The patient has however had a high-grade lesion of the external genitalia and would likely be best off undergoing colposcopy of vagina and cervix.     We discussed doing this under anesthesia which would likely require episiotomy to allow the speculum to fit.  The patient is in favor of moving forward to this.     We have therefore recommended episiotomy with colposcopy of vagina and cervix and possible cervical biopsies.  We discussed with the patient risks and benefits of the procedure including bleeding requiring transfusion infection damage to local structures including episiotomy sutures.  The patient understands accepts the " "risks of surgery and has signed an informed consent.  Preoperative testing has been ordered.  Consent was obtained.\"    From physician note in  April 2024 \"Patient is a very pleasant 58-year-old female with a history of low-grade TYSON of cervix and inability to get to the cervix due to vulvovaginal narrowing secondary to prior AME 3 surgery.     Patient underwent colposcopy and ECC.  Colposcopy was unremarkable.  ECC revealed the following:  inal Diagnosis  A. Cervix, Endocervical, ECC:  - Scant benign endocervical tissue and squamous epithelium.     Episiotomy and repair were without difficulty.     Today, the patient is doing well.  She denies significant abdominal pain, pelvic pain, nausea, vomiting, constipation, diarrhea, fevers, chills, or vaginal bleeding.    : -Normal external female genitalia, healing well status post episiotomy normal Bartholin's and [place]'s glands                  -Normal midline urethral meatus. No lesions notes                  -Bladder without fullness mass or tenderness                  -Vagina without lesion or discharge No significant cystocele or rectocele noted\"    Pt comes to PT for vaginal stenosis.  Pt has pain with penetration. She does not get a period but could not use a tampon.   In 2021 she had a layer of skin removed from her vulva because it was precancerous. Everything was good up until about a year post-op. Pt reports if she inserted her finger inside she was having difficulty inserting. She also began having discomfort with pelvic exams. Last time she had a pelvic exam and the year before she had difficulty with a speculum.   She would like to be able to participate in her pelvic exams.       Social Support:     Lives in:  Multiple-level home    Lives with:  Alone    Work status: employed full time (office work)    Life stress severity: mild    History of Depression: yes  Diet and Exercise:    Diet:balanced nutrition    On and off pt will use hand weights every other " "day and trying to walk on TM but does not have the stamina  OB/ gyn History    Gestational History:     Prior Pregnancy: No      Menstrual History:    Pt does not get a menstrual period   Bladder Function:     Voiding Difficulties positive for: urgency and incomplete emptying      Voiding Difficulties negative for: hesitancy and straining       Voiding Difficulties comments:     Voiding frequency: every 3-4 hours    Urinary leakage: urine leakage    Urinary leakage aggravated by: coughing, sneezing, post-void dribble and unknown    Nocturia (episodes per night): 0    Painful urination: No (she has a dermatoloical condition and has difficulty with wiping too much because of soreness)      Intake (ounces):     Intake (ounces) comment: Water: 16-24 oz  Juice in the evening or in the weekend - grape or other kinds combined with sparkling water   Incontinence Management:     Pads/Diaper Use:  Day    Pads/Diapers Additional Comments: liner - when out of the house  Bowel Function:     Bowel Function comments:  Pt takes a nutritional supplement to manage bowels otherwise reports she would go 1-2 times a week   Pt unable to reports tool type     Bowel frequency: daily    Stool softener use: no stool softeners    Enema use: no enema    Uses \"squatty potty\": no Squatty Potty  Sexual Function:     Sexually Active:  Not sexually activeDifficulty with pelvic exams, unable to use speculum :  Pain:     No pain reported by patient.  Treatments:     Current treatment: physical therapy    Patient Goals:     Other patient goals:  Be able to particiapte in pelvicexams fully and not leak      Objective   Graphical documentation:     Difficulty with pelvic exams, unable to use speculum       Pt provided consent prior to and throughout initiation of external/internal assessment  Pt declined second person in the room      Position: supine exam      General Perineum Exam:   Perineum intact  Pelvic organ prolapse at rest: not " visulaized  Clitoral prepuce mobility: not assessed     Visual Inspection of Perineum:   Excursion of perineal body in cephalad direction with contraction of pelvic floor muscles (PFM): good  Excursion of perineal body in caudal direction with relaxation of pelvic floor muscles (PFM): good  Involuntary relaxation with bearing down: Y    Involuntary contraction with coughing: Y  Anal [place]: present  [place] swab test: non-tender  Sensation: intact     Pelvic Organ Prolapse   Position: hook-lying  At rest: none  With bearing down: not assessed     Pelvic Floor Muscle Exam        Tenderness to palpation:  Mild tenderness/discomfort throughout external perineum  PT able to insert index finger 50% of the way to ist knuckle. Pt requested PT does not insert further at this point,        Precautions:   Patient Active Problem List   Diagnosis   • Morbid obesity with BMI of 40.0-44.9, adult (HCC)   • Anxiety   • Depression   • Hyperlipidemia   • Glaucoma   • Migraine   • Combined forms of age-related cataract, left eye   • Headache   • Essential hypertension   • Visual disorder   • Combined forms of age-related cataract, right eye   • AME III (vulvar intraepithelial neoplasia III)   • PONV (postoperative nausea and vomiting)   • CPAP (continuous positive airway pressure) dependence   • Sleep apnea   • Atypical glandular cells on cervical Pap smear   • LGSIL on Pap smear of cervix       Impairments/functional limitations: vaginal stenosis, inability to participate in pelvic exams   Daily Treatment Diary    Manuals 7/30         External assess performed         Internal assess performed         External genitalia/perineal body Desensitization, STM/TPR, 5 mins         Objective assessment nv         Neuro Re-Ed          TAC          PF          TAC+PF          TAC with march          TAC with alt knee fall out          TAC with ball squeeze          TAC with SLR          TAC with s/l ABD          TAC with bridge          TAC with  bianca                    360 deg breathing nv         Ther Ex                    Seated piri stretch          Seated hamstring stretch          Supine piri stretch          LTR          Supine butterfly stretch                                                  Ther Activity          Self external/internal STM  External genitalia/perineal body, issued handout         Vaginal dilators Types,ordering                                                 Gait Training                              Modalities

## 2024-08-08 ENCOUNTER — OFFICE VISIT (OUTPATIENT)
Dept: PHYSICAL THERAPY | Facility: REHABILITATION | Age: 59
End: 2024-08-08
Payer: COMMERCIAL

## 2024-08-08 DIAGNOSIS — M62.89 PELVIC FLOOR DYSFUNCTION: Primary | ICD-10-CM

## 2024-08-08 DIAGNOSIS — N89.5 VAGINAL STENOSIS: ICD-10-CM

## 2024-08-08 PROCEDURE — 97110 THERAPEUTIC EXERCISES: CPT | Performed by: PHYSICAL THERAPIST

## 2024-08-08 PROCEDURE — 97140 MANUAL THERAPY 1/> REGIONS: CPT | Performed by: PHYSICAL THERAPIST

## 2024-08-08 PROCEDURE — 97112 NEUROMUSCULAR REEDUCATION: CPT | Performed by: PHYSICAL THERAPIST

## 2024-08-08 PROCEDURE — 97530 THERAPEUTIC ACTIVITIES: CPT | Performed by: PHYSICAL THERAPIST

## 2024-08-08 NOTE — PROGRESS NOTES
Daily Note     Today's date: 2024  Patient name: Pratima Kline  : 1965  MRN: [id number]  Referring provider: Silvina Bazan DO  Dx:   Encounter Diagnosis     ICD-10-CM    1. Pelvic floor dysfunction  M62.89       2. Vaginal stenosis  N89.5           Start Time: 905  Stop Time: 1000  Total time in clinic (min): 55 minutes    Subjective: Pt reports she did not have any pain/discomfort after last visit. Pt has trouble reaching her vaginal opening with her thumb. Pt looked into vaginal dilators but she did not find something she wanted. She would like to find a silicone set that is mid range in price. Pt reports her back has been sore. She got a massage which helped temporarily.       Objective: See treatment diary below    Pt provided verbal consent prior to and throughout objective assessment and session    Posture: swayback     Tenderness:  Piriformis: neg b/l  Obturator Internus: neg b/l  Post pelvic floor: neg b/l  Adductors: pos b/l mid and distal 1/3rd   Tenderness in linea alba: distal 1/3rd    Lumbar AROM   WNL     SLS  L: fair  R: fair, ipsilateral lean, pos trendelenburg    Other Comments:  Fair TAC, compensates with breath holding   Good 360 deg breathing    MMT:   Left Right   Hip flex 4-, breath holding 4-, breath holding    Hip ABD 3+ 4   Hip ext 4- 4   Hip IR 4 4+   Hip ER 4 4+   Knee flex 5 5   Knee ext 4+ 4+   Ankle DF 4+ 4+               Special Tests:   Left Right   LELAND - -   FADIR - -   Post Thigh Thrust - -   Hip IR PROM 30 deg  25 deg   Pop angle  46 deg 55 deg       Assessment: PT cued pt on alternative to using her finger for self stretching and desensitization of external genitalia by using  a pelvic wand. PT provided verbal and visual cueing for use and tactile cueing for pressure level applied. Pt verbalized understanding of this and will consider purchase of a pelvic wand to allow for independent management of her symptoms. Pt will also continue to consider purchase of vaginal  dilators. PT performed objective assessment as documented above and pt  presents with L>R proximal hip weakness as well as R>L hip tightness. PT initiated LE stretches  with focus on PFM relaxation to improve overall mobility. Pt will benefit from progression of POC to include proximal hip strengthening to improve weakness with focus on coordination with TAC and breathing. Tolerated treatment well. Patient would benefit from continued PT      Plan: Continue per plan of care.  Assess if pt utilized pelvic wand for external/internal pelvic stretches. Assess compliance with HEP.     Access Code: [email or web address]  URL: [email or web address]  Date: 08/08/2024  Prepared by: Kathleen Woo    Program Notes  Pelvic drop: lengthen pelvic floor while exhaling for 3-4 secs. Then relax. Repeat 5times.     Exercises  - Supine Butterfly Groin Stretch  - 2 x daily - 1 reps - 30-60 secs hold  - Supine Hip Internal and External Rotation  - 2 x daily - 10 reps - 5-10 secs hold  - Seated Hamstring Stretch  - 2 x daily - 2 reps - 30 secs hold  - Seated Piriformis Stretch  - 2 x daily - 2 reps - 30 secs hold  - Seated Transversus Abdominis Bracing  - 1 x daily - 10 reps - 10 secs hold     Precautions:   Patient Active Problem List   Diagnosis    Morbid obesity with BMI of 40.0-44.9, adult (HCC)    Anxiety    Depression    Hyperlipidemia    Glaucoma    Migraine    Combined forms of age-related cataract, left eye    Headache    Essential hypertension    Visual disorder    Combined forms of age-related cataract, right eye    AME III (vulvar intraepithelial neoplasia III)    PONV (postoperative nausea and vomiting)    CPAP (continuous positive airway pressure) dependence    Sleep apnea    Atypical glandular cells on cervical Pap smear    LGSIL on Pap smear of cervix       Impairments/functional limitations: vaginal stenosis, inability to participate in pelvic exams   Daily Treatment Diary    Manuals 7/30 8/8        External assess  "performed         Internal assess performed         External genitalia/perineal body Desensitization, STM/TPR, 5 mins         Objective assessment nv performed        Post hip mob, hip IR PROM                     Neuro Re-Ed          Pelvic drop  3\"x5 with exhale        TAC  Review, seated         PF          TAC+PF          TAC with ball squeeze  nv        TAC with SLR  nv        TAC with s/l ABD  nv        TAC with bridge  nv        TAC with clamshells                    360 deg breathing nv Review, with stretches        Ther Ex                    Seated piri stretch  30\"x2 ea        Seated hamstring stretch  30\"x2 ea        Supine hip IR/ER AROM   10\"x5 ea        Supine butterfly stretch  1 min                                                Ther Activity          Self external/internal STM  External genitalia/perineal body, issued handout Review, alternate option use of pelvic wand, review technique        Vaginal dilators Types,ordering                                                 Gait Training                              Modalities                                         "

## 2024-08-16 ENCOUNTER — OFFICE VISIT (OUTPATIENT)
Dept: PHYSICAL THERAPY | Facility: REHABILITATION | Age: 59
End: 2024-08-16
Payer: COMMERCIAL

## 2024-08-16 DIAGNOSIS — N89.5 VAGINAL STENOSIS: ICD-10-CM

## 2024-08-16 DIAGNOSIS — M62.89 PELVIC FLOOR DYSFUNCTION: Primary | ICD-10-CM

## 2024-08-16 PROCEDURE — 97530 THERAPEUTIC ACTIVITIES: CPT | Performed by: PHYSICAL THERAPIST

## 2024-08-16 PROCEDURE — 97110 THERAPEUTIC EXERCISES: CPT | Performed by: PHYSICAL THERAPIST

## 2024-08-16 PROCEDURE — 97112 NEUROMUSCULAR REEDUCATION: CPT | Performed by: PHYSICAL THERAPIST

## 2024-08-16 NOTE — PROGRESS NOTES
Daily Note     Today's date: 2024  Patient name: Pratima Kline  : 1965  MRN: [id number]  Referring provider: Silvina Bazan DO  Dx:   Encounter Diagnosis     ICD-10-CM    1. Pelvic floor dysfunction  M62.89       2. Vaginal stenosis  N89.5           Start Time: 903  Stop Time: 959  Total time in clinic (min): 56 minutes    Subjective: Pt ordered her pelvic wand and received it but she has not used it. She has questions. Pt also has questions abut the external stretches. She reports she can only get them in one time a day.      Objective: See treatment diary below      Assessment: PT cued pt with visual cue regarding use of pelvic wand and progressed instructions for self internal STM to include insertion of thumb or pelvic wand to length of 1st knuckle and applying a gentle stretch to any spot that is tender between 3-6 and 9-6 o'clock, assuring she is not causing an increase in symptoms.Pt verbalized understanding of this and issued handout. PT cued pt on pelvic drop to coordinate with exhalation versus compensating with breath holding as part of HEP for PFM relaxation. PT reviewed HEP and cued pt to avoid compensation with trunk rotation. PT initiated proximal hip strenghtening with focus on coordination of TAC and breathing.Tolerated treatment well. Patient would benefit from continued PT      Plan: Continue per plan of care.  Assess response to use of pelvic wand for internal self stretching of 1st layer of PFM as well as to updated HEP.    Access Code: [email or web address]  URL: [email or web address]  Date: 2024  Prepared by: Kathleen Woo    Program Notes  Pelvic drop: lengthen pelvic floor while exhaling for 3-4 secs. Then relax. Repeat 5times.     Exercises  - Supine Butterfly Groin Stretch  - 2 x daily - 1 reps - 30-60 secs hold  - Supine Hip Internal and External Rotation  - 2 x daily - 10 reps - 5-10 secs hold  - Seated Hamstring Stretch  - 2 x daily - 2 reps - 30 secs hold  - Seated  "Piriformis Stretch  - 2 x daily - 2 reps - 30 secs hold  - Seated Transversus Abdominis Bracing  - 1 x daily - 10 reps - 10 secs hold  - Small Range Straight Leg Raise  - 1 x daily - 3 x weekly - 20 reps  - Supine Bridge  - 1 x daily - 3 x weekly - 20 reps - 2-3 secs hold  - Sidelying Hip Abduction  - 1 x daily - 3 x weekly - 20 reps  - Clamshell  - 1 x daily - 3 x weekly - 20 reps - 5 secs hold     Precautions:   Patient Active Problem List   Diagnosis    Morbid obesity with BMI of 40.0-44.9, adult ([facility])    Anxiety    Depression    Hyperlipidemia    Glaucoma    Migraine    Combined forms of age-related cataract, left eye    Headache    Essential hypertension    Visual disorder    Combined forms of age-related cataract, right eye    AME III (vulvar intraepithelial neoplasia III)    PONV (postoperative nausea and vomiting)    CPAP (continuous positive airway pressure) dependence    Sleep apnea     Atypical glandular cells on cervical Pap smear    LGSIL on Pap smear of cervix       Impairments/functional limitations: vaginal stenosis, inability to participate in pelvic exams   Daily Treatment Diary    Manuals 7/30 8/8 8/16       External assess performed         Internal assess performed         External genitalia/perineal body Desensitization, STM/TPR, 5 mins         Objective assessment nv performed        Post hip mob, hip IR PROM    nv                 Neuro Re-Ed          Pelvic drop  3\"x5 with exhale Review, 3\"x2 with exhale       TAC  Review, seated  Review, while breathing, with exercises       PF          TAC+PF          TAC with SLR  nv X10 L, x7 R       TAC with s/l ABD  nv X10 ea       TAC with bridge  nv 3\"x10       TAC with clamshells   5\"x10 ea       SLS          360 deg breathing nv Review, with stretches        Ther Ex                    Seated piri stretch  30\"x2 ea        Seated hamstring stretch  30\"x2 ea 30\"x1 ea       Supine hip IR/ER AROM   10\"x5 ea 10\"x5 ea       Supine butterfly stretch  1 min " 1 min                 Standing hip ABD          Standing hip ext          Standing march                                         Ther Activity          Self external/internal STM  External genitalia/perineal body, issued handout Review, alternate option use of pelvic wand, review technique Review, external genitalia/perineal body and insertion [address] [place] [address] 3-6, 9-6 o'clock       Vaginal dilators Types,ordering                                                 Gait Training                              Modalities

## 2024-08-23 ENCOUNTER — OFFICE VISIT (OUTPATIENT)
Dept: PHYSICAL THERAPY | Facility: REHABILITATION | Age: 59
End: 2024-08-23
Payer: COMMERCIAL

## 2024-08-23 DIAGNOSIS — N89.5 VAGINAL STENOSIS: ICD-10-CM

## 2024-08-23 DIAGNOSIS — M62.89 PELVIC FLOOR DYSFUNCTION: Primary | ICD-10-CM

## 2024-08-23 PROCEDURE — 97530 THERAPEUTIC ACTIVITIES: CPT | Performed by: PHYSICAL THERAPIST

## 2024-08-23 PROCEDURE — 97140 MANUAL THERAPY 1/> REGIONS: CPT | Performed by: PHYSICAL THERAPIST

## 2024-08-23 PROCEDURE — 97112 NEUROMUSCULAR REEDUCATION: CPT | Performed by: PHYSICAL THERAPIST

## 2024-08-23 NOTE — PROGRESS NOTES
Daily Note     Today's date: 2024  Patient name: Pratima Kline  : 1965  MRN: [id number]  Referring provider: Silvina Bazan DO  Dx:   Encounter Diagnosis     ICD-10-CM    1. Pelvic floor dysfunction  M62.89       2. Vaginal stenosis  N89.5           Start Time: 1104  Stop Time: 1202  Total time in clinic (min): 58 minutes    Subjective: Pt tried using her pelvic wand for her internal stretches. She does not find it easy to do with her pelvic wand. She reports her vaginal opening is sore. She had an episiotomy in April and feels soreness form where she was sewn up. Because she is using the pelvic wand she is not sure she is putting it in the right spot. She does not report an increase in pain with her self internal stretches. She is inserting her pelvic wand but she is not sure how far she is inserting. She is struggling with TAC and breathing and she is not sure she is in the right position with sidelying exercises. She reports the TAC in siting can hurt her back.      Objective: See treatment diary below      Assessment: PT rec internal vaginal assessment and manual therapy in clinic to asssit pt in [facility] comofrt with technique for self stretching and to assess progress with manual therapy however pt declined at this time as she would like to continue independently before assessing progress. PT cued pt to focus on gentle stretch felt with internal stretches, with sensation less than 5/10 at not worsening during her stretch. Pt verbalized understanding of this. Pt unsure how far she is able to insert her wand because she cannot see it, PT rec potential use of a mirror to improve visualization. PT cued pt to perform TAC in any position that is comfortable for her or use a back rest if doing so in seated to avoid back pain. Patient performed recumbent bike aerobic exercise to increase blood flow to the area being treated, prepare the muscles for strength training and stretching, improve overall  "tolerance to activity, and aerobic endurance. PT cued pt ot focus on neutral hip/knee alignment to improve hip IR/flexion mobility while on bike during forward revolution. PT initiated hip IR PROM with post hip mobs b/l to improve ROM. PT cued pt on coordination of TAC and breathing. PT utilized conversation and out loud counting during TC to encourage breathing during activation of TAC. Pt demo good exercise technique with current strengthening exercises.   Tolerated treatment well. Patient would benefit from continued PT      Plan: Continue per plan of care.  Consider progression to standing strengthening exercises.      Precautions:   Patient Active Problem List   Diagnosis    Morbid obesity with BMI of 40.0-44.9, adult ([facility])    Anxiety    Depression    Hyperlipidemia    Glaucoma    Migraine    Combined forms of age-related cataract, left eye    Headache    Essential hypertension    Visual disorder    Combined forms of age-related cataract, right eye    AME III (vulvar intraepithelial neoplasia III)    PONV (postoperative nausea and vomiting)    CPAP (continuous positive airway pressure) dependence    Sleep apnea     Atypical glandular cells on cervical Pap smear    LGSIL on Pap smear of cervix       Impairments/functional limitations: vaginal stenosis, inability to participate in pelvic exams   Daily Treatment Diary    Manuals 7/30 8/8 8/16 8/23      External assess performed         Internal assess performed   Pt declines      External genitalia/perineal body Desensitization, STM/TPR, 5 mins   Pt declines      Objective assessment nv performed        Post hip mob, hip IR PROM    nv 8 mins b/l                 Neuro Re-Ed          Pelvic drop  3\"x5 with exhale Review, 3\"x2 with exhale       TAC  Review, seated  Review, while breathing, with exercises       PF          TAC+PF          TAC with SLR  nv X10 L, x7 R X10 ea       TAC with s/l ABD  nv X10 ea X10 ea      TAC with bridge  nv 3\"x10 3\"x10      TAC with " "clamshells   5\"x10 ea 5\"x10 ea      SLS          360 deg breathing nv Review, with stretches        Ther Ex          Bike     5 mins AROM      Seated piri stretch  30\"x2 ea  HEP      Seated hamstring stretch  30\"x2 ea 30\"x1 ea HEP      Supine hip IR/ER AROM   10\"x5 ea 10\"x5 ea HEP      Supine butterfly stretch  1 min 1 min HEP                Standing hip ABD          Standing hip ext          Standing march                                         Ther Activity          Self external/internal STM  External genitalia/perineal body, issued handout Review, alternate option use of pelvic wand, review technique Review, external genitalia/perineal body and insertion 1st knuckle 3-6, 9-6 o'clock Review, technique, frequency, intensity       Vaginal dilators Types,ordering                                                 Gait Training                              Modalities                                             "

## 2024-08-30 ENCOUNTER — OFFICE VISIT (OUTPATIENT)
Dept: PHYSICAL THERAPY | Facility: REHABILITATION | Age: 59
End: 2024-08-30
Payer: COMMERCIAL

## 2024-08-30 DIAGNOSIS — N89.5 VAGINAL STENOSIS: ICD-10-CM

## 2024-08-30 DIAGNOSIS — M62.89 PELVIC FLOOR DYSFUNCTION: Primary | ICD-10-CM

## 2024-08-30 PROCEDURE — 97110 THERAPEUTIC EXERCISES: CPT | Performed by: PHYSICAL THERAPIST

## 2024-08-30 PROCEDURE — 97112 NEUROMUSCULAR REEDUCATION: CPT | Performed by: PHYSICAL THERAPIST

## 2024-08-30 NOTE — PROGRESS NOTES
Daily Note     Today's date: 2024  Patient name: Pratima Kline  : 1965  MRN: [id number]  Referring provider: Silvina Bazan DO  Dx:   Encounter Diagnosis     ICD-10-CM    1. Pelvic floor dysfunction  M62.89       2. Vaginal stenosis  N89.5           Start Time: 1103  Stop Time: 1201  Total time in clinic (min): 58 minutes    Subjective: Pt reports the stretches seem too easy now. She is still challenged with the strengthening exercises. Pt is not sure how far she is inserting her pelvic wand. She has not tried to use a mirror but has found it difficult in the past to utilize a mirror.       Objective: See treatment diary below      Assessment: Patient performed recumbent bike aerobic exercise to increase blood flow to the area being treated, prepare the muscles for strength training and stretching, improve overall tolerance to activity, and aerobic endurance with vc/tc for neutral hip/knee alignment during forward revolutions. PT initiated proximal hip strengthening in standing with focus on good load transfer and maintaining hips level. Pt most challenged with R>L LE stance due to hip weakness with cueing needed to avoid compensation with hip drop.  Tolerated treatment well. Patient would benefit from continued PT      Plan: Continue per plan of care.      Precautions:   Patient Active Problem List   Diagnosis    Morbid obesity with BMI of 40.0-44.9, adult (HCC)    Anxiety    Depression    Hyperlipidemia    Glaucoma    Migraine    Combined forms of age-related cataract, left eye    Headache    Essential hypertension    Visual disorder    Combined forms of age-related cataract, right eye    AME III (vulvar intraepithelial neoplasia III)    PONV (postoperative nausea and vomiting)    CPAP (continuous positive airway pressure) dependence    Sleep apnea     Atypical glandular cells on cervical Pap smear    LGSIL on Pap smear of cervix       Impairments/functional limitations: vaginal stenosis, inability to  "participate in pelvic exams   Daily Treatment Diary    Manuals 7/30 8/8 8/16 8/23 8/30     External assess performed         Internal assess performed   Pt declines      External genitalia/perineal body Desensitization, STM/TPR, 5 mins   Pt declines      Objective assessment nv performed        Post hip mob, hip IR PROM    nv 8 mins b/l  6 mins b/l               Neuro Re-Ed          Pelvic drop  3\"x5 with exhale Review, 3\"x2 with exhale       TAC  Review, seated  Review, while breathing, with exercises       PF          TAC+PF          TAC with SLR  nv X10 L, x7 R X10 ea  HEP     TAC with s/l ABD  nv X10 ea X10 ea HEP     TAC with bridge  nv 3\"x10 3\"x10 HEP     TAC with clamshells   5\"x10 ea 5\"x10 ea HEP     SLS     30\"x2with b/l UE support     Hip hike to neutral     Review, x5 ea      360 deg breathing nv Review, with stretches        Ther Ex          Bike     5 mins AROM 8 mins L1      Seated piri stretch  30\"x2 ea  HEP      Seated hamstring stretch  30\"x2 ea 30\"x1 ea HEP      Supine hip IR/ER AROM   10\"x5 ea 10\"x5 ea HEP      Supine butterfly stretch  1 min 1 min HEP                Standing hip ABD     X10 ea     Standing hip ext     X10 ea     Standing march      X10 ea     Wall hip ABD/ER katalina     5\"x5 ea                         Ther Activity          Self external/internal STM  External genitalia/perineal body, issued handout Review, alternate option use of pelvic wand, review technique Review, external genitalia/perineal body and insertion 1st knuckle 3-6, 9-6 o'clock Review, technique, frequency, intensity       Vaginal dilators Types,ordering                                                 Gait Training                              Modalities                                               "

## 2024-09-06 ENCOUNTER — OFFICE VISIT (OUTPATIENT)
Dept: PHYSICAL THERAPY | Facility: REHABILITATION | Age: 59
End: 2024-09-06
Payer: COMMERCIAL

## 2024-09-06 DIAGNOSIS — M62.89 PELVIC FLOOR DYSFUNCTION: Primary | ICD-10-CM

## 2024-09-06 DIAGNOSIS — N89.5 VAGINAL STENOSIS: ICD-10-CM

## 2024-09-06 PROCEDURE — 97140 MANUAL THERAPY 1/> REGIONS: CPT | Performed by: PHYSICAL THERAPIST

## 2024-09-06 PROCEDURE — 97112 NEUROMUSCULAR REEDUCATION: CPT | Performed by: PHYSICAL THERAPIST

## 2024-09-06 PROCEDURE — 97110 THERAPEUTIC EXERCISES: CPT | Performed by: PHYSICAL THERAPIST

## 2024-09-06 NOTE — PROGRESS NOTES
Daily Note     Today's date: 2024  Patient name: Pratima Kline  : 1965  MRN: [id number]  Referring provider: Silvina Bazan DO  Dx:   Encounter Diagnosis     ICD-10-CM    1. Pelvic floor dysfunction  M62.89       2. Vaginal stenosis  N89.5           Start Time: 901  Stop Time: 1000  Total time in clinic (min): 59 minutes    Subjective: Pt is doing her HEP and feels the stretches are easy and she is not sure she is still doing them right. She is doing her table exercises but reports they are still challenging for her and she is not sure she is getting stronger with them. Pt also notes she can insert her pelvic wand easier depending on which direction the wand is facing.       Objective: See treatment diary below      Assessment: PT cued pt on exercise progression strategy regarding purpose and benefits of NWB versus progression to WB'ing exercises and importance of improving proximal hip strength and stability to offload PFM. PT cued pt on exercise technique with LE stretches, goal of feeling a stretch but not pain. PT also cued pt on maintaining hips level with SLS on R>L.Tolerated treatment well. Patient would benefit from continued PT with re-eval NV to determine progress towards LTG's and continued need for skilled PT.       Plan: Continue per plan of care.  Progress note during next visit.  Pt to continue with HEP and internal stretches.      Precautions:   Patient Active Problem List   Diagnosis    Morbid obesity with BMI of 40.0-44.9, adult (HCC)    Anxiety    Depression    Hyperlipidemia    Glaucoma    Migraine    Combined forms of age-related cataract, left eye    Headache    Essential hypertension    Visual disorder    Combined forms of age-related cataract, right eye    AME III (vulvar intraepithelial neoplasia III)    PONV (postoperative nausea and vomiting)    CPAP (continuous positive airway pressure) dependence    Sleep apnea     Atypical glandular cells on cervical Pap smear    LGSIL on  "Pap smear of cervix       Impairments/functional limitations: vaginal stenosis, inability to participate in pelvic exams   Daily Treatment Diary    Manuals 7/30 8/8 8/16 8/23 8/30 9/6    External assess performed         Internal assess performed   Pt declines      External genitalia/perineal body Desensitization, STM/TPR, 5 mins   Pt declines      Objective assessment nv performed        Post hip mob, hip IR PROM    nv 8 mins b/l  6 mins b/l 4 mins b/l    Adductor STM/skin rolling       6 mins L>R    Neuro Re-Ed          Pelvic drop  3\"x5 with exhale Review, 3\"x2 with exhale       TAC  Review, seated  Review, while breathing, with exercises       PF          TAC+PF          TAC with SLR  nv X10 L, x7 R X10 ea  HEP     TAC with s/l ABD  nv X10 ea X10 ea HEP     TAC with bridge  nv 3\"x10 3\"x10 HEP     TAC with clamshells   5\"x10 ea 5\"x10 ea HEP     SLS     30\"x2with b/l UE support 30\"x1 with b/l UE support    Hip hike to neutral     Review, x5 ea  review    360 deg breathing nv Review, with stretches        Ther Ex          Bike     5 mins AROM 8 mins L1  10 mins L1     Seated piri stretch  30\"x2 ea  HEP  30\"x1 ea    Seated hamstring stretch  30\"x2 ea 30\"x1 ea HEP  30\"x1 ea    Supine hip IR/ER AROM   10\"x5 ea 10\"x5 ea HEP  10\"x1 ea    Supine butterfly stretch  1 min 1 min HEP  HEP              Standing hip ABD     X10 ea X5 ea    Standing hip ext     X10 ea X5 ea    Standing march      X10 ea X5 ea    Wall hip ABD/ER katalina     5\"x5 ea 5\"x5 ea                        Ther Activity          Self external/internal STM  External genitalia/perineal body, issued handout Review, alternate option use of pelvic wand, review technique Review, external genitalia/perineal body and insertion 1st knuckle 3-6, 9-6 o'clock Review, technique, frequency, intensity       Vaginal dilators Types,ordering                                                 Gait Training                              Modalities                                "

## 2024-09-13 ENCOUNTER — APPOINTMENT (OUTPATIENT)
Dept: PHYSICAL THERAPY | Facility: REHABILITATION | Age: 59
End: 2024-09-13
Payer: COMMERCIAL

## 2024-09-20 ENCOUNTER — EVALUATION (OUTPATIENT)
Dept: PHYSICAL THERAPY | Facility: REHABILITATION | Age: 59
End: 2024-09-20
Payer: COMMERCIAL

## 2024-09-20 DIAGNOSIS — N89.5 VAGINAL STENOSIS: ICD-10-CM

## 2024-09-20 DIAGNOSIS — M62.89 PELVIC FLOOR DYSFUNCTION: Primary | ICD-10-CM

## 2024-09-20 PROCEDURE — 97140 MANUAL THERAPY 1/> REGIONS: CPT | Performed by: PHYSICAL THERAPIST

## 2024-09-20 PROCEDURE — 97110 THERAPEUTIC EXERCISES: CPT | Performed by: PHYSICAL THERAPIST

## 2024-09-20 NOTE — PROGRESS NOTES
PT Re-Evaluation     Today's date: 2024  Patient name: Pratima Kline  : 1965  MRN: [id number]  Referring provider: Silvina Bazan DO  Dx:   Encounter Diagnosis     ICD-10-CM    1. Pelvic floor dysfunction  M62.89       2. Vaginal stenosis  N89.5             Start Time: 901  Stop Time: 1000  Total time in clinic (min): 59 minutes    Assessment  Impairments: abnormal or restricted ROM, impaired balance, impaired physical strength, lacks appropriate home exercise program and poor posture     Assessment details: Patient is a 58 y.o. female presenting to initial examination with chief complaint of vaginal stenosis. Pt presents upon re-evaluation with increased hip and hamstring motion, increased proximal hip strength and improved tolerance to vaginal insertion with her pelvic wand. Pt declines internal assessment this visit or NV and would like to continue with internal stretches independently prior to re-assessing in 1 month. Pt still remains with L>R hip weakness and bladder urgency associated with sensation of needing to void shortly after voiding as well as voiding JIC.Pt will benefit from continued skilled PT to address above impairments and continue towards meeting LTG's.     Understanding of Dx/Px/POC: good     Prognosis: good    Goals  Short term goals (to be met by 4 weeks)    Pt will perform Kegel consistently prior to cough/sneeze in 6 weeks. - not assessed  Pt will void with feeling of complete emptying with intervoid interval of 2 hours or more -not met    Long Term Goals (to be met by discharge    -Pt will be independent with HEP - progressing   -Pt will be able to tolerate speculum for well woman exam - unable to assess    Updated goals;  Pt will present with MMT 4/5 b/l LE all planes         Plan  Patient would benefit from: skilled physical therapy  Planned modality interventions: biofeedback, cryotherapy and thermotherapy: hydrocollator packs    Planned therapy interventions: joint  mobilization, manual therapy, neuromuscular re-education, patient/caregiver education, strengthening, stretching, therapeutic activities, therapeutic exercise, abdominal trunk stabilization, balance, flexibility and home exercise program    Frequency: 1-2x month  Duration in weeks: 12  Treatment plan discussed with: patient  Plan details: Cont per POC. Issue urge deferral strategy NV, healthy bladder habits, progress strengthening.       PT Pelvic Floor Subjective:   History of Present Illness:   Pt reports 20% improvement.   Pt reports the stretching exercises are easier to do. She is continuing to do them and feels more flexible.    Pt reports she has been doing her self vaginal internal stretches as prescribed with her pelvic wand. She is holding the stretch from 3-6 and 9-6 o'clock and holding each position for 1.5 mins. She notices pressure, not pain. She is working on trying to find a comfortable position.    She still feels the strengthening exercises are hard to do especially the standing ones are hard to balance even with holding on to something.     Pt will be having her annual pelvic exam in December.     Pt comes to PT for vaginal stenosis.  Pt has pain with penetration.   She would like to be able to participate in her pelvic exams without discomfort.    Pt is stressed with the amount of time the exercises take her.       Social Support:     Lives in:  Multiple-level home    Lives with:  Alone    Work status: employed full time (office work)    Life stress severity: mild    History of Depression: yes  Diet and Exercise:    Diet:balanced nutrition    On and off pt will use hand weights every other day and trying to walk on TM but does not have the stamina  OB/ gyn History    Gestational History:     Prior Pregnancy: No      Menstrual History:    Pt does not get a menstrual period   Bladder Function:     Voiding Difficulties positive for: urgency (will feel like she needs to pee 5 mins after peeing. Pt  "repots she voids before she leaves her house and will void again when she comes to the clinic to be sure her bladder is empty.) and incomplete emptying      Voiding Difficulties negative for: hesitancy and straining       Voiding Difficulties comments:     Voiding frequency: every 3-4 hours and every 1-2 hours    Urinary leakage: urine leakage (pt reports this is not her concern)    Urinary leakage aggravated by: coughing, sneezing and post-void dribble    Urinary leakage not aggravated by: unknown    Nocturia (episodes per night): 0    Painful urination: No (she has a dermatoloical condition and has difficulty with wiping too much because of soreness)      Intake (ounces):     Intake (ounces) comment: Water: 16-24 oz  Juice in the evening or on the weekend - grape or other kinds combined with sparkling water   Incontinence Management:     Pads/Diaper Use:  Day    Pads/Diapers Additional Comments: liner - when out of the house  Bowel Function:     Bowel Function comments:  Pt takes a nutritional supplement to manage bowels otherwise reports she would go 1-2 times a week   Pt unable to reports tool type     Bowel frequency: daily    Stool softener use: no stool softeners    Enema use: no enema    Uses \"squatty potty\": no Squatty Potty  Sexual Function:     Sexually Active:  Not sexually activeDifficulty with pelvic exams, unable to use speculum :  Pain:     No pain reported by patient.  Treatments:     Current treatment: physical therapy    Patient Goals:     Other patient goals:  Be able to particiapte in pelvic exams fully and not feeling like sheneeds to go to the bathroom very soon after going to the bathroom.      Objective   Graphical documentation:     Difficulty with pelvic exams, unable to use speculum     Pt provided verbal consent prior to and throughout objective assessment and session    Tenderness:  Adductors: pos b/l mid and distal 1/3rd R and distal 1.3rd L   Tenderness in linea alba: neg    SLS  L: " "fair  R: fair, ipsilateral lean, pos trendelenburg    Other Comments:  Fair TAC, compensates with breath holding   Good 360 deg breathing    MMT:   Left Right   Hip flex 4 4   Hip ABD 4- 4   Hip ext 4 4+   Hip IR 4 4+   Hip ER 4 4+   Knee flex 5 5   Knee ext 4+ 4+   Ankle DF 4+ 4+               Special Tests:   Left Right   LELAND - -   FADIR - -   Post Thigh Thrust - -   Hip IR PROM 42 deg  40 deg   Pop angle  40 deg 49 deg          Precautions:   Patient Active Problem List   Diagnosis    Morbid obesity with BMI of 40.0-44.9, adult (HCC)    Anxiety    Depression    Hyperlipidemia    Glaucoma    Migraine    Combined forms of age-related cataract, left eye    Headache    Essential hypertension    Visual disorder    Combined forms of age-related cataract, right eye    AME III (vulvar intraepithelial neoplasia III)    PONV (postoperative nausea and vomiting)    CPAP (continuous positive airway pressure) dependence    Sleep apnea    Atypical glandular cells on cervical Pap smear    LGSIL on Pap smear of cervix         Impairments/functional limitations: vaginal stenosis, inability to participate in pelvic exams   Daily Treatment Diary    Manuals     8/30 9/6 9/20   External assess       Pt defer for 2 visits   Internal assess       Pt defer for 2 visits   External genitalia/perineal body          Objective assessment          Post hip mob, hip IR PROM      6 mins b/l 4 mins b/l np   Adductor STM/skin rolling       6 mins L>R nv             Re-eval       Performed, 45 mins   Neuro Re-Ed          Pelvic drop          TAC          PF          TAC+PF          TAC with SLR     HEP  D/c   TAC with s/l ABD     HEP  D/c   TAC with bridge     HEP  D/c   TAC with clamshells     HEP  D/c   SLS     30\"x2with b/l UE support 30\"x1 with b/l UE support HEP   Hip hike to neutral     Review, x5 ea  review    360 deg breathing          Ther Ex          HEP review       performed   Bike      8 mins L1  10 mins L1  np   Seated piri stretch  " "    30\"x1 ea HEP   Seated hamstring stretch      30\"x1 ea HEP   Supine hip IR/ER AROM       10\"x1 ea HEP   Supine butterfly stretch      HEP HEP             Standing hip ABD     X10 ea X5 ea HEP   Standing hip ext     X10 ea X5 ea HEP   Standing march      X10 ea X5 ea HEP   Wall hip ABD/ER katalina     5\"x5 ea 5\"x5 ea HEP             Side stepping           FSU with march          Sit to stand                              Ther Activity          Self external/internal STM  External genitalia/perineal body, issued handout Review, alternate option use of pelvic wand, review technique Review, external genitalia/perineal body and insertion 1st knuckle 3-6, 9-6 o'clock Review, technique, frequency, intensity       Vaginal dilators Types,ordering         Urge deferral/FBS       nv   Healthy bladder habits: fluid intake/spacing, voiding JIC, 2-4 hour intervoid interval       NV                       Gait Training                              Modalities                                                 "

## 2024-09-27 ENCOUNTER — OFFICE VISIT (OUTPATIENT)
Dept: PHYSICAL THERAPY | Facility: REHABILITATION | Age: 59
End: 2024-09-27
Payer: COMMERCIAL

## 2024-09-27 DIAGNOSIS — M62.89 PELVIC FLOOR DYSFUNCTION: Primary | ICD-10-CM

## 2024-09-27 DIAGNOSIS — N89.5 VAGINAL STENOSIS: ICD-10-CM

## 2024-09-27 PROCEDURE — 97530 THERAPEUTIC ACTIVITIES: CPT | Performed by: PHYSICAL THERAPIST

## 2024-09-27 PROCEDURE — 97110 THERAPEUTIC EXERCISES: CPT | Performed by: PHYSICAL THERAPIST

## 2024-09-27 NOTE — PROGRESS NOTES
Daily Note     Today's date: 2024  Patient name: Pratima Kline  : 1965  MRN: [id number]  Referring provider: Silvina Bazan DO  Dx:   Encounter Diagnosis     ICD-10-CM    1. Pelvic floor dysfunction  M62.89       2. Vaginal stenosis  N89.5           Start Time: 901  Stop Time: 954  Total time in clinic (min): 53 minutes    Subjective: Pt will feel like she has to void 15 mins after emptying her bladder.       Objective: See treatment diary below      Assessment: PT cued pt on maintaining 2-4 hour intervoid interval during the day and utilize urge deferral strategy with FBS to assure pt voids at a low urge. Pt to read strategy daily for repeated exposure to improve improve pt's ability to successfully use strategy as needed. PT also cued pt on healthy bladder habits to improve voiding mechanics including minimizing voiding JIC, spacing fluid intake out in her day, and potential bladder irritants. Pt verbalized understanding of this. PT reviewed HEP as far as frequency or exercises with focus on stretching, strengthening and internal vaginal stretches. When pt returns, pt will benefit form internal vaginal assessment to assess progress with pts consent as well as strength testing to progress program as appropriate.   Tolerated treatment well. Patient would benefit from continued PT      Plan: Continue per plan of care.  Assess response to urge deferral. Perform internal vaginal assessment with pt's consent. Assess MMT.      Precautions:   Patient Active Problem List   Diagnosis    Morbid obesity with BMI of 40.0-44.9, adult (HCC)    Anxiety    Depression    Hyperlipidemia    Glaucoma    Migraine    Combined forms of age-related cataract, left eye    Headache    Essential hypertension    Visual disorder    Combined forms of age-related cataract, right eye    AME III (vulvar intraepithelial neoplasia III)    PONV (postoperative nausea and vomiting)    CPAP (continuous positive airway pressure) dependence  "   Sleep apnea    Atypical glandular cells on cervical Pap smear    LGSIL on Pap smear of cervix         Impairments/functional limitations: vaginal stenosis, inability to participate in pelvic exams   Daily Treatment Diary    Manuals 9/27 8/30 9/6 9/20   External assess       Pt defer for 2 visits   Internal assess       Pt defer for 2 visits   External genitalia/perineal body          Objective assessment          Post hip mob, hip IR PROM      6 mins b/l 4 mins b/l np   Adductor STM/skin rolling       6 mins L>R nv             Re-eval       Performed, 45 mins   Neuro Re-Ed          Pelvic drop          TAC          PF Vc provided for use of quick flick contraction during urge deferral         TAC+PF          SLS     30\"x2with b/l UE support 30\"x1 with b/l UE support HEP   Hip hike to neutral     Review, x5 ea  review    360 deg breathing Vc provided for use during urge deferral strategy to calm nervous system          Ther Ex          HEP review       performed   Bike      8 mins L1  10 mins L1  np   Seated piri stretch      30\"x1 ea HEP   Seated hamstring stretch      30\"x1 ea HEP   Supine hip IR/ER AROM       10\"x1 ea HEP   Supine butterfly stretch      HEP HEP             Standing hip ABD     X10 ea X5 ea HEP   Standing hip ext     X10 ea X5 ea HEP   Standing march      X10 ea X5 ea HEP   Wall hip ABD/ER katalnia     5\"x5 ea 5\"x5 ea HEP             Side stepping           FSU with march          Sit to stand                              Ther Activity          Self external/internal STM           Vaginal dilators          Urge deferral/FBS review      nv   Healthy bladder habits: fluid intake/spacing, voiding JIC, 2-4 hour intervoid interval review      NV                       Gait Training                              Modalities                                                      "

## 2024-10-18 ENCOUNTER — OFFICE VISIT (OUTPATIENT)
Dept: PHYSICAL THERAPY | Facility: REHABILITATION | Age: 59
End: 2024-10-18
Payer: COMMERCIAL

## 2024-10-18 DIAGNOSIS — N89.5 VAGINAL STENOSIS: ICD-10-CM

## 2024-10-18 DIAGNOSIS — M62.89 PELVIC FLOOR DYSFUNCTION: Primary | ICD-10-CM

## 2024-10-18 PROCEDURE — 97140 MANUAL THERAPY 1/> REGIONS: CPT | Performed by: PHYSICAL THERAPIST

## 2024-10-18 PROCEDURE — 97530 THERAPEUTIC ACTIVITIES: CPT | Performed by: PHYSICAL THERAPIST

## 2024-10-18 NOTE — PROGRESS NOTES
Daily Note     Today's date: 10/18/2024  Patient name: Pratima Kline  : 1965  MRN: [id number]  Referring provider: Silvina Bazan DO  Dx:   Encounter Diagnosis     ICD-10-CM    1. Pelvic floor dysfunction  M62.89       2. Vaginal stenosis  N89.5           Start Time: 1102  Stop Time: 1202  Total time in clinic (min): 60 minutes    Subjective: Pt reports she forgot about her urge deferral until about 1 week ago. She does not understand it now that she found it. Pt reports she will leak a little right before she sits down on the toilet because of her knees, she has trouble with sitting down in time. Pt is trying to focus on 2-4 hour intervoid interval at work but not at home. She does not reports it is hard to do at home, she just not want to do it 24 hours a day. She tracks an excel spreadsheet of her voids to help her see how long she is waiting to go between voids. She feels her internal stretches are done every other day. She does not feel it is getting harder or easier. She does not have any pain. She has soreness at her vaginal opening that does not increase with her pelvic wand.     Be able to particiapte in pelvic exams fully and not leak  - PROGRESSING       Objective: See treatment diary below    Objective   Graphical documentation:      Difficulty with pelvic exams, unable to use speculum         Pt provided consent prior to and throughout initiation of external/internal assessment  Pt declined second person in the room     Position: supine exam     General Perineum Exam:   Perineum intact  Pelvic organ prolapse at rest: not visulaized       Visual Inspection of Perineum:   Excursion of perineal body in cephalad direction with contraction of pelvic floor muscles (PFM): good  Excursion of perineal body in caudal direction with relaxation of pelvic floor muscles (PFM): good  Involuntary relaxation with bearing down: Y  Involuntary contraction with coughing: Y       Pelvic Organ Prolapse   Position:  hook-lying  At rest: none  With bearing down: none     Pelvic Floor Muscle Exam         Tenderness to palpation:  Neg tenderness/discomfort throughout external perineum  PT able to incrementally insert index finger fully this session without reports of pain. PT was able to assess all 3 layers of PFM for tenderness to palpation which was not present on exam today.       MMT:    Left Right   Hip flex 4 4   Hip ABD 4 4   Hip ext 4 4   Hip IR 4+ 4+   Hip ER 4+ 4+   Knee flex 5 5   Knee ext 4+ 4+   Ankle DF 4+ 4+                     Tenderness:  Adductors: neg    SLS  L: fair, ipsilateral lean, intermittent UE support   R: fair, ipsilateral lean    Assessment: PT cued pt on urge deferral strategy with FBS with goal of down-regulating nervous system, decreasing fight or flight response, assuring she is voiding at a low urge. Pt wll focus on this at home when she is in her bathroom prior to sitting down as that is when she is having urinary leakage. Pt demo's positive response to current treatment plan as she is able to participate with full finger insertion without pain during vaginal internal assessment. Pt is considering dilators vaginally to progress her tolerance to penetration to meet her goal of having a pelvic exam with speculum without pain. PT cued pt on proper technique with use of vaginal dilators including assuring she is starting with a comfortable size she can insert, maintain x5-15 mins and remove without pain and progressing gradually to an increased stretch. PT rec a vaginal dilator set with a handle as pt has had difficulty in the past with reaching vaginal opening. Pt to cont with current POC and will benefit form progression of HEP nv to increase strength and stability to cont to offload PFM.  Tolerated treatment well. Patient would benefit from continued PT       Goals  Short term goals (to be met by 4 weeks)     Pt will perform Kegel consistently prior to cough/sneeze in 6 weeks. - MET  Pt will void  with feeling of complete emptying with intervoid interval of 2 hours or more -PARTIALLY MET, pt would like to only focus on this at work right now, has not tried at home      Long Term Goals (to be met by discharge     -Pt will be independent with HEP - progressing   -Pt will be able to tolerate speculum for well woman exam - unable to assess, pt able to tolerate full fnger insertion without pain on pelvic exam      Updated goals;  Pt will present with MMT 4/5 b/l LE all planes - MET         Plan: Continue per plan of care. P 1x every 3 weeks to progress POC and progress towards independence for d/c. Progress strengthening exercises. Assess urinary leakage response to urge deferral strategy as well as potential use of dilators.      Precautions:   Patient Active Problem List   Diagnosis    Morbid obesity with BMI of 40.0-44.9, adult (HCC)    Anxiety    Depression    Hyperlipidemia    Glaucoma    Migraine    Combined forms of age-related cataract, left eye    Headache    Essential hypertension    Visual disorder    Combined forms of age-related cataract, right eye    AME III (vulvar intraepithelial neoplasia III)    PONV (postoperative nausea and vomiting)    CPAP (continuous positive airway pressure) dependence    Sleep apnea    Atypical glandular cells on cervical Pap smear    LGSIL on Pap smear of cervix         Impairments/functional limitations: vaginal stenosis, inability to participate in pelvic exams   Daily Treatment Diary    Manuals 9/27 10/18   8/30 9/6 9/20   External assess  performed     Pt defer for 2 visits   Internal assess  performed     Pt defer for 2 visits   External genitalia/perineal body          Objective assessment          Post hip mob, hip IR PROM      6 mins b/l 4 mins b/l np   Adductor STM/skin rolling       6 mins L>R nv             Re-eval  Performed 15 mins      Performed, 45 mins   Neuro Re-Ed          Pelvic drop          TAC          PF Vc provided for use of quick flick  "contraction during urge deferral During urge deferral         TAC+PF          SLS  HEP   30\"x2with b/l UE support 30\"x1 with b/l UE support HEP   Hip hike to neutral     Review, x5 ea  review    360 deg breathing Vc provided for use during urge deferral strategy to calm nervous system          Ther Ex          HEP review       performed   Bike      8 mins L1  10 mins L1  np   Seated piri stretch      30\"x1 ea HEP   Seated hamstring stretch      30\"x1 ea HEP   Supine hip IR/ER AROM       10\"x1 ea HEP   Supine butterfly stretch      HEP HEP             Standing hip ABD     X10 ea X5 ea HEP   Standing hip ext     X10 ea X5 ea HEP   Standing march      X10 ea X5 ea HEP   Wall hip ABD/ER katalina     5\"x5 ea 5\"x5 ea HEP             Side stepping   nv        FSU with march  nv        Sit to stand  nv                            Ther Activity          Self external/internal STM           Vaginal dilators  Review         Urge deferral/FBS review Review      nv   Healthy bladder habits: fluid intake/spacing, voiding JIC, 2-4 hour intervoid interval review Review      NV                       Gait Training                              Modalities                                                        "

## 2024-11-08 ENCOUNTER — OFFICE VISIT (OUTPATIENT)
Dept: PHYSICAL THERAPY | Facility: REHABILITATION | Age: 59
End: 2024-11-08
Payer: COMMERCIAL

## 2024-11-08 DIAGNOSIS — N89.5 VAGINAL STENOSIS: ICD-10-CM

## 2024-11-08 DIAGNOSIS — M62.89 PELVIC FLOOR DYSFUNCTION: Primary | ICD-10-CM

## 2024-11-08 PROCEDURE — 97112 NEUROMUSCULAR REEDUCATION: CPT | Performed by: PHYSICAL THERAPIST

## 2024-11-08 PROCEDURE — 97110 THERAPEUTIC EXERCISES: CPT | Performed by: PHYSICAL THERAPIST

## 2024-11-08 PROCEDURE — 97530 THERAPEUTIC ACTIVITIES: CPT | Performed by: PHYSICAL THERAPIST

## 2024-11-08 NOTE — PROGRESS NOTES
Daily Note     Today's date: 2024  Patient name: Pratima Kline  : 1965  MRN: [id number]  Referring provider: Silvina Bazan DO  Dx:   Encounter Diagnosis     ICD-10-CM    1. Pelvic floor dysfunction  M62.89       2. Vaginal stenosis  N89.5           Start Time: 905  Stop Time: 959  Total time in clinic (min): 54 minutes    Subjective: Pt has been doing her exercises but is very challenged with wall hip ABD/ER katalina and she does not want to do it anymore. Pt is using her urge deferral strategy at work but was still voiding JIC before leaving for work. Now she is not going to the bathroom before leaving for work. She is tracking intervoid intervals and is mostly staying within 2-4 hours. She reports her urinary leakage is still present but unsure if it has improved at all. Pt is using dilators 5 times a week. She has a set of 5 dilators and is using the second dilator. She reports once she can figure out how to insert it it is comfortable and more comfortable then the pelvic wand. She is leaving it in place x10 mins.       Objective: See treatment diary below      Assessment: Pt is progressing well with improved urinary urgency control and intervoid interval. Pt is also utilizing dilators with success and PT cued pt on appropriate progression of dilators focusing on beginning and ending a session with the most comfortable size and utilizing the larger size in between, gradually increasing length of time with larger dilator. PT session focused on progression of hip and core strength this session to assist with offloading PFM. Patient performed recumbent bike aerobic exercise to increase blood flow to the area being treated, prepare the muscles for strength training and stretching, improve overall tolerance to activity, and aerobic endurance   Tolerated treatment well. Patient would benefit from continued PT      Plan: Continue per plan of care.  Potential discharge next visit.    Access Code:  [email or web address]  URL: [email or web address]  Date: 11/08/2024  Prepared by: Kathleen Woo    Program Notes  Pelvic drop: lengthen pelvic floor while exhaling for 3-4 secs. Then relax. Repeat 5 times.     Exercises  - Supine Butterfly Groin Stretch  - 3 x weekly - 1 reps - 30-60 secs hold  - Supine Hip Internal and External Rotation  - 3 x weekly - 10 reps - 5-10 secs hold  - Seated Hamstring Stretch  - 3 x weekly - 2 reps - 30 secs hold  - Seated Piriformis Stretch  - 3 x weekly - 2 reps - 30 secs hold  - Standing Single Leg Stance with Counter Support  - 1 x daily - 2 reps - 30 secs hold  - Sidestepping  - 3-4 x weekly - 2 sets - 10 reps  - Runner's Climb  - 3-4 x weekly - 2 sets - 10 reps  - Sit to Stand  - 3-4 x weekly - 2 sets - 10 reps  - Single Leg Balance with Clock Reach  - 3-4 x weekly - 2 sets - 10 reps  - Standing Plank on Wall  - 3-4 x weekly - 2 reps - 30-60 secs hold     Precautions:   Patient Active Problem List   Diagnosis    Morbid obesity with BMI of 40.0-44.9, adult (HCC)    Anxiety    Depression    Hyperlipidemia    Glaucoma    Migraine    Combined forms of age-related cataract, left eye    Headache    Essential hypertension    Visual disorder    Combined forms of age-related cataract, right eye    AME III (vulvar intraepithelial neoplasia III)    PONV (postoperative nausea and vomiting)    CPAP (continuous positive airway pressure) dependence    Sleep apnea    Atypical glandular cells on cervical Pap smear    LGSIL on Pap smear of cervix         Impairments/functional limitations: vaginal stenosis, inability to participate in pelvic exams   Daily Treatment Diary    Manuals 9/27 10/18 11/8   9/6 9/20   External assess  performed     Pt defer for 2 visits   Internal assess  performed     Pt defer for 2 visits   External genitalia/perineal body          Objective assessment          Post hip mob, hip IR PROM       4 mins b/l np   Adductor STM/skin rolling       6 mins L>R nv            "  Re-eval  Performed 15 mins      Performed, 45 mins   Neuro Re-Ed          Pelvic drop          TAC          PF Vc provided for use of quick flick contraction during urge deferral During urge deferral         TAC+PF          SLS  HEP    30\"x1 with b/l UE support HEP   SLS with 3 way tap    X5 ea                 Wall plank   30\"x1 ea                 Hip hike to neutral      review    360 deg breathing Vc provided for use during urge deferral strategy to calm nervous system          Ther Ex          HEP review       performed   Bike       10 mins L1  np   Seated piri stretch      30\"x1 ea HEP   Seated hamstring stretch      30\"x1 ea HEP   Supine hip IR/ER AROM       10\"x1 ea HEP   Supine butterfly stretch      HEP HEP             Standing hip ABD   D/c   X5 ea HEP   Standing hip ext   D/c   X5 ea HEP   Standing march    D/c   X5 ea HEP   Wall hip ABD/ER aktalina   D/c   5\"x5 ea HEP             Side stepping   nv X10 in squat       FSU with march  nv X10 ea with rail       Sit to stand  nv X10                            Ther Activity          Self external/internal STM           Vaginal dilators  Review  Review, progression       Urge deferral/FBS review Review      nv   Healthy bladder habits: fluid intake/spacing, voiding JIC, 2-4 hour intervoid interval review Review      NV                       Gait Training                              Modalities                                                          "

## 2024-12-06 ENCOUNTER — OFFICE VISIT (OUTPATIENT)
Dept: PHYSICAL THERAPY | Facility: REHABILITATION | Age: 59
End: 2024-12-06
Payer: COMMERCIAL

## 2024-12-06 DIAGNOSIS — M62.89 PELVIC FLOOR DYSFUNCTION: Primary | ICD-10-CM

## 2024-12-06 DIAGNOSIS — N89.5 VAGINAL STENOSIS: ICD-10-CM

## 2024-12-06 PROCEDURE — 97530 THERAPEUTIC ACTIVITIES: CPT | Performed by: PHYSICAL THERAPIST

## 2024-12-06 PROCEDURE — 97112 NEUROMUSCULAR REEDUCATION: CPT | Performed by: PHYSICAL THERAPIST

## 2024-12-06 PROCEDURE — 97140 MANUAL THERAPY 1/> REGIONS: CPT | Performed by: PHYSICAL THERAPIST

## 2024-12-06 PROCEDURE — 97110 THERAPEUTIC EXERCISES: CPT | Performed by: PHYSICAL THERAPIST

## 2024-12-06 NOTE — PROGRESS NOTES
PT Re-Evaluation  and PT Discharge    Today's date: 2024  Patient name: Pratima Kline  : 1965  MRN: [id number]  Referring provider: Silvina Bazan DO  Dx:   Encounter Diagnosis     ICD-10-CM    1. Pelvic floor dysfunction  M62.89       2. Vaginal stenosis  N89.5             Start Time: 0900  Stop Time: 0954  Total time in clinic (min): 54 minutes    Assessment  Impairments: abnormal or restricted ROM, impaired balance, impaired physical strength, lacks appropriate home exercise program and poor posture     Assessment details: Pt presents overall improved function, is independent with HEP and is appropriate for d/c from skilled PT to manage symptoms independently and progress towards meeting LTG's. Pt is in agreement with this POC.     Understanding of Dx/Px/POC: good     Prognosis: good    Goals  Short term goals (to be met by 4 weeks)    Pt will perform Kegel consistently prior to cough/sneeze in 6 weeks. - not assessed  Pt will void with feeling of complete emptying with intervoid interval of 2 hours or more -MOSTLY MET    Long Term Goals (to be met by discharge    -Pt will be independent with HEP - progressing   -Pt will be able to tolerate speculum for well woman exam - unable to assess    Updated goals;  Pt will present with MMT 4/5 b/l LE all planes - MET        Plan    Planned therapy interventions: patient/caregiver education and home exercise program    Treatment plan discussed with: patient  Plan details: Pt d/c'ed from skilled PT       PT Pelvic Floor Subjective:   History of Present Illness:   Pt reports she has had an extremely busy months and for the first time since starting therapy she had to skip a few days of her exercises, about 7-8 times.     She has moved up the the next size dilator. She starts out with the previous dilator. She is able to insert her second dilator all the way, just takes her about 20-30 secs to figure out how to insert it.     She is busy at work and sometimes she  will go 5-6 hours without voiding. She does not drink as much at work as she does at home. At home she goes more frequently and she will have urgency and some leakage. She still needs to work on using her FBS strategy at home.    Pt reports 50% improvement.   Pt reports she notes improvement with voiding and stretches.     She still feels like the strengthening exercises.     Pt will be having her annual pelvic exam December 20th   Social Support:     Lives in:  Multiple-level home    Lives with:  Alone    Work status: employed full time (office work)    Life stress severity: mild    History of Depression: yes  Diet and Exercise:    Diet:balanced nutrition    On and off pt will use hand weights every other day and trying to walk on TM but does not have the stamina  OB/ gyn History    Gestational History:     Prior Pregnancy: No      Menstrual History:    Pt does not get a menstrual period   Bladder Function:     Voiding Difficulties positive for: urgency (less voiding JIC, will still do sobefore leaving for work or for a long trip)      Voiding Difficulties negative for: hesitancy, straining and incomplete emptying       Voiding Difficulties comments:     Voiding frequency: every 3-4 hours, every 1-2 hours and more than every 4 hours    Urinary leakage: urine leakage    Urinary leakage not aggravated by: coughing, sneezing, post-void dribble and unknown    Nocturia (episodes per night): 0 (will void before bed)    Painful urination: No      Intake (ounces):     Intake (ounces) comment: Water: 16-24 oz  Juice in the evening or on the weekend - grape or other kinds combined with sparkling water   Incontinence Management:     Pads/Diaper Use:  Day    Pads/Diapers Additional Comments: liner - when out of the house  Bowel Function:     Bowel Function comments:  Pt takes a nutritional supplement to manage bowels otherwise reports she would go 1-2 times a week   Pt unable to reports tool type     Bowel frequency: daily     "Stool softener use: no stool softeners    Enema use: no enema    Uses \"squatty potty\": no Squatty Potty  Sexual Function:     Sexually Active:  Not sexually activeDifficulty with pelvic exams, unable to use speculum   Has not had a pelvic exam recently:  Pain:     No pain reported by patient.  Treatments:     Current treatment: physical therapy    Patient Goals:     Other patient goals:  Be able to particiapte in pelvic exams fully and not feeling like she needs to go to the bathroom very soon after going to the bathroom - PROGRESSING      Objective   Graphical documentation:     Difficulty with pelvic exams, unable to use speculum   Has not had a pelvic exam recently    Pt provided verbal consent prior to and throughout objective assessment and session    Tenderness:  Adductors: soreness b/l mid and distal 1/3rd     SLS  L: good load transfer, unable to maintain >2 secs  R: good load transfer, unable to maintain >2 secs    Other Comments:  Fair TAC, compensates with breath holding     MMT:   Left Right   Hip flex 4 4+   Hip ABD 4+ 4   Hip ext 4+ 4   Hip IR 4+ 4+   Hip ER 4+ 4+   Knee flex 4+ 5   Knee ext 4+ 4+   Ankle DF 4+ 4+               Special Tests:   Left Right   LELAND - -   FADIR - -   Post Thigh Thrust - -        Pop angle  40 deg 49 deg       Access Code: [email or web address]  URL: [email or web address]  Date: 12/06/2024  Prepared by: Kathleen Woo    Program Notes  Pelvic drop: lengthen pelvic floor while exhaling for 3-4 secs. Then relax. Repeat 5 times.     Exercises  - Supine Butterfly Groin Stretch  - 3 x weekly - 1 reps - 30-60 secs hold  - Supine Hip Internal and External Rotation  - 3 x weekly - 10 reps - 5-10 secs hold  - Seated Hamstring Stretch  - 3 x weekly - 2 reps - 30 secs hold  - Seated Piriformis Stretch  - 3 x weekly - 2 reps - 30 secs hold  - Single Leg Stance  - 1 x daily - 2 reps - 30 secs hold  - Single Leg Balance with Clock Reach  - 3-4 x weekly - 2 sets - 10 reps  - Sidestepping  " "- 3-4 x weekly - 2 sets - 10 reps  - Runner's Climb  - 3-4 x weekly - 2 sets - 10 reps  - Sit to Stand  - 3-4 x weekly - 2 sets - 10 reps  - Standing Plank on Wall  - 3-4 x weekly - 2 reps - 30-60 secs hold       Precautions:   Patient Active Problem List   Diagnosis    Morbid obesity with BMI of 40.0-44.9, adult (HCC)    Anxiety    Depression    Hyperlipidemia    Glaucoma    Migraine    Combined forms of age-related cataract, left eye    Headache    Essential hypertension    Visual disorder    Combined forms of age-related cataract, right eye    AME III (vulvar intraepithelial neoplasia III)    PONV (postoperative nausea and vomiting)    CPAP (continuous positive airway pressure) dependence    Sleep apnea    Atypical glandular cells on cervical Pap smear    LGSIL on Pap smear of cervix         Impairments/functional limitations: vaginal stenosis, inability to participate in pelvic exams   Daily Treatment Diary    Manuals 9/27 10/18 11/8 12/6   9/20   External assess  performed     Pt defer for 2 visits   Internal assess  performed     Pt defer for 2 visits   External genitalia/perineal body          Objective assessment          Post hip mob, hip IR PROM        np   Adductor STM/skin rolling        nv             Re-eval  Performed 15 mins   Performed 20 mins    Performed, 45 mins   Neuro Re-Ed          Pelvic drop          TAC          PF Vc provided for use of quick flick contraction during urge deferral During urge deferral         TAC+PF          SLS  HEP  30\"x1 ea   HEP   SLS with 3 way tap    X5 ea X5 ea                 Wall plank   30\"x1 ea 1 min x1, x1 ea alt march or alt toe tap                 Hip hike to neutral          360 deg breathing Vc provided for use during urge deferral strategy to calm nervous system          Ther Ex          HEP review       performed   Bike        np   Seated piri stretch       HEP   Seated hamstring stretch       HEP   Supine hip IR/ER AROM        HEP   Supine butterfly " stretch       HEP                       Side stepping   nv X10 in squat X10 in squat, cue for post weightshift      FSU with march  nv X10 ea with rail X5 with rail       Sit to stand  nv X10  X10 ea, arms crossed                           Ther Activity          Self external/internal STM           Vaginal dilators  Review  Review, progression Review, progression, weaning in future       Urge deferral/FBS review Review   Review, at home, walking to the bathroom    nv   Healthy bladder habits: fluid intake/spacing, voiding JIC, 2-4 hour intervoid interval review Review      NV                       Gait Training                              Modalities

## 2024-12-20 ENCOUNTER — ANNUAL EXAM (OUTPATIENT)
Dept: GYNECOLOGY | Facility: CLINIC | Age: 59
End: 2024-12-20
Payer: COMMERCIAL

## 2024-12-20 VITALS
HEIGHT: 64 IN | SYSTOLIC BLOOD PRESSURE: 136 MMHG | DIASTOLIC BLOOD PRESSURE: 78 MMHG | WEIGHT: 260 LBS | BODY MASS INDEX: 44.39 KG/M2

## 2024-12-20 DIAGNOSIS — Z01.419 ENCOUNTER FOR ANNUAL ROUTINE GYNECOLOGICAL EXAMINATION: Primary | ICD-10-CM

## 2024-12-20 DIAGNOSIS — Z12.31 ENCOUNTER FOR SCREENING MAMMOGRAM FOR BREAST CANCER: ICD-10-CM

## 2024-12-20 DIAGNOSIS — L90.0 LICHEN SCLEROSUS: ICD-10-CM

## 2024-12-20 DIAGNOSIS — N95.2 VAGINAL ATROPHY: ICD-10-CM

## 2024-12-20 DIAGNOSIS — Z11.51 SCREENING FOR HPV (HUMAN PAPILLOMAVIRUS): ICD-10-CM

## 2024-12-20 PROCEDURE — S0612 ANNUAL GYNECOLOGICAL EXAMINA: HCPCS | Performed by: OBSTETRICS & GYNECOLOGY

## 2024-12-20 PROCEDURE — G0476 HPV COMBO ASSAY CA SCREEN: HCPCS | Performed by: OBSTETRICS & GYNECOLOGY

## 2024-12-20 PROCEDURE — G0145 SCR C/V CYTO,THINLAYER,RESCR: HCPCS | Performed by: OBSTETRICS & GYNECOLOGY

## 2024-12-20 RX ORDER — QUETIAPINE FUMARATE 100 MG/1
TABLET, FILM COATED ORAL
COMMUNITY
Start: 2024-11-14

## 2024-12-20 RX ORDER — ESTRADIOL 0.1 MG/G
CREAM VAGINAL
Qty: 42.5 G | Refills: 1 | Status: SHIPPED | OUTPATIENT
Start: 2024-12-20

## 2024-12-20 RX ORDER — CLOBETASOL PROPIONATE 0.5 MG/G
CREAM TOPICAL
Qty: 30 G | Refills: 1 | Status: SHIPPED | OUTPATIENT
Start: 2024-12-20

## 2024-12-20 NOTE — PROGRESS NOTES
Name: Pratima Kline      : 1965      MRN: [id number]  Encounter Provider: Silvina Bazan DO  Encounter Date: 2024   [facility]  :  Assessment & Plan  Encounter for annual routine gynecological examination    Orders:    Liquid-based pap, screening    Encounter for screening mammogram for breast cancer    Orders:    Mammo screening bilateral w 3d and cad; Future    Vaginal atrophy    Orders:    estradiol (ESTRACE VAGINAL) 0.1 mg/g vaginal cream; Insert 1gm per vagina twice a week at bedtime    Lichen sclerosus    Orders:    clobetasol (TEMOVATE) 0.05 % cream; Apply sparingly once a week at bedtime    Pap and HPV done today    mammogram reviewed with her including breast density.  Mammogram ordered and she has this scheduled for Monday discussed self breast exams    colon cancer screening in , recall     Lichen sclerosus-this is minimally symptomatic however I recommended that she use clobetasol once a week to prevent recurrences.  Prescription sent    Vaginal atrophy-I recommended that she restart the vaginal estrogen as this will help in her use with the vaginal dilators.  While she does have scar tissue, she does have some menopausal changes and the estrogen should be beneficial.    discussed preventive care, regular exercise and a healthy diet    Discussed resuming vaginal estrogen       History of Present Illness   HPI  Pratima Kline is a 59 y.o. female who presents for yearly.  History of vulvectomy for AME 3 and she has had mild cervical dysplasia     Low-grade TYSON Pap in 2023 with positive HPV 16.  Colposcopy could not be done due to significant scar tissue and stenosis.  Dr. Kwong took her to the OR and she had a colposcopy done with normal-appearing cervix and upper vagina and normal ECC.  This has been very frustrating to her.  She has occasional mild itching.  Dr. Kwong felt this was from lichen sclerosus.  This was concurrently  noted on her biopsy when AME 3 was noted.    She has not been using vaginal estrogen.  She used the whole tube but did not refill it.  She has been seeing pelvic floor PT with good results and is also using vaginal dilators.  She has been discharged but is continuing to do the exercises on her own.    Normal 3D mammogram last December with scattered fibroglandular densities and average risk            Review of Systems   Constitutional: Negative.    Gastrointestinal: Negative.    Genitourinary: Negative.           Objective   LMP  (LMP Unknown)      Physical Exam  Vitals and nursing note reviewed. Exam conducted with a chaperone present.   Constitutional:       General: She is not in acute distress.     Appearance: She is well-developed.   HENT:      Head: Normocephalic and atraumatic.   Eyes:      Conjunctiva/sclera: Conjunctivae normal.   Neck:      Thyroid: No thyromegaly.   Cardiovascular:      Rate and Rhythm: Normal rate and regular rhythm.      Heart sounds: No murmur heard.  Pulmonary:      Effort: Pulmonary effort is normal. No respiratory distress.      Breath sounds: Normal breath sounds.   Chest:   Breasts:     Right: Normal.      Left: Normal.      Comments: Examined seated and supine  Abdominal:      Palpations: Abdomen is soft.      Tenderness: There is no abdominal tenderness.   Genitourinary:     General: Normal vulva.      Vagina: Normal.      Cervix: Normal.      Uterus: Normal.       Adnexa: Right adnexa normal and left adnexa normal.      Rectum: Normal.      Comments: Able to get a long atrophic speculum inserted, difficult to fully visualize cervix, Pap done.  Some hypopigmentation around the introitus  Labial fusion   status post vulvectomy  Musculoskeletal:         General: No swelling.      Cervical back: Neck supple.   Skin:     General: Skin is warm and dry.      Capillary Refill: Capillary refill takes less than 2 seconds.   Neurological:      Mental Status: She is alert.   Psychiatric:          Mood and Affect: Mood normal.

## 2024-12-27 LAB
LAB AP GYN PRIMARY INTERPRETATION: NORMAL
Lab: NORMAL

## 2024-12-30 ENCOUNTER — RESULTS FOLLOW-UP (OUTPATIENT)
Dept: GYNECOLOGY | Facility: CLINIC | Age: 59
End: 2024-12-30

## 2025-03-13 ENCOUNTER — TELEPHONE (OUTPATIENT)
Age: 60
End: 2025-03-13

## 2025-03-13 NOTE — TELEPHONE ENCOUNTER
Patient calling in, she is unsure how far to fill her applicator with her estradiol cream.  She reports the applicator is 4gm total. Advised patient she is ordered 1gm.  She reports there are no marks that make sense to her on the applicator and only fractions are labeled.  Advised filling 1/4 of the way but patient cannot understand what this means.

## 2025-03-14 NOTE — TELEPHONE ENCOUNTER
Called patient back, read and explained Dr. Bazan's recommendations for estrace cream.  Patient is frustrated that she still does not understand how much to use based off the markings on her applicator. Suggested she send [employer] message with a photo of the applicator so we can better assist her.  Walked her through how to do this.  Awaiting photo.

## 2025-06-12 DIAGNOSIS — N95.2 VAGINAL ATROPHY: ICD-10-CM

## 2025-06-13 RX ORDER — ESTRADIOL 0.1 MG/G
CREAM VAGINAL
Qty: 42.5 G | Refills: 1 | Status: SHIPPED | OUTPATIENT
Start: 2025-06-13

## (undated) DEVICE — CYSTOTOME IRRIGATION 25G

## (undated) DEVICE — DRAPE EQUIPMENT RF WAND

## (undated) DEVICE — OCUCOAT

## (undated) DEVICE — THE MONARCH® "D" CARTRIDGE IS A SINGLE-USE POLYPROPYLENE CARTRIDGE FOR POSTERIOR CHAMBER IOL DELIVERY: Brand: MONARCH® III

## (undated) DEVICE — NEEDLE COUNTER LG W/RULER

## (undated) DEVICE — INTENDED FOR TISSUE SEPARATION, AND OTHER PROCEDURES THAT REQUIRE A SHARP SURGICAL BLADE TO PUNCTURE OR CUT.: Brand: BARD-PARKER SAFETY BLADES SIZE 15, STERILE

## (undated) DEVICE — 3M™ TEGADERM™ TRANSPARENT FILM DRESSING FRAME STYLE, 1624W, 2-3/8 IN X 2-3/4 IN (6 CM X 7 CM), 100/CT 4CT/CASE: Brand: 3M™ TEGADERM™

## (undated) DEVICE — 3000CC GUARDIAN II: Brand: GUARDIAN

## (undated) DEVICE — NEEDLE FILTER 5 MICR 19G X 1.5IN

## (undated) DEVICE — GLOVE INDICATOR PI UNDERGLOVE SZ 7.5 BLUE

## (undated) DEVICE — OPHTHALMIC KNIFE 15°: Brand: ALCON

## (undated) DEVICE — TUBING SUCTION 5MM X 12 FT

## (undated) DEVICE — NEEDLE BLUNT 18 G X 1 1/2IN

## (undated) DEVICE — GLOVE PI ULTRA TOUCH SZ.7.5

## (undated) DEVICE — NEEDLE 25G X 5/8 SAFETY

## (undated) DEVICE — PREMIUM DRY TRAY LF: Brand: MEDLINE INDUSTRIES, INC.

## (undated) DEVICE — RING CORNEA FIXATION CLEAR

## (undated) DEVICE — STERILE 8 INCH PROCTO SWAB: Brand: CARDINAL HEALTH

## (undated) DEVICE — PENCIL ELECTROSURG E-Z CLEAN -0035H

## (undated) DEVICE — SCD SEQUENTIAL COMPRESSION COMFORT SLEEVE MEDIUM KNEE LENGTH: Brand: KENDALL SCD

## (undated) DEVICE — 30° ROUND, 0.9 MM TURBOSONICS® TAPERED ABS® MICROTIP™ TIP: Brand: ALCON, TURBOSONICS, TAPERED ABS, MICROTIP

## (undated) DEVICE — NEEDLE SPINAL 22G X 3.5IN  QUINCKE

## (undated) DEVICE — BETHLEHEM UNIVERSAL MINOR VAG: Brand: CARDINAL HEALTH

## (undated) DEVICE — BLADE CORNEA CLEAR 2.8

## (undated) DEVICE — SMOKE EVACUATION TUBING WITH 7/8 IN TO 1/4 IN REDUCER: Brand: BUFFALO FILTER

## (undated) DEVICE — ELECTRODE BALL E-Z CLEAN 5 IN -0009

## (undated) DEVICE — SUT VICRYL 2-0 SH 27 IN UNDYED J417H

## (undated) DEVICE — EXIDINE 4 PCT

## (undated) DEVICE — SYRINGE 3ML LL

## (undated) DEVICE — PACK PIC GENERIC

## (undated) DEVICE — SUT MONOCRYL 3-0 PS-2 27 IN Y427H

## (undated) DEVICE — GLOVE PI ULTRA TOUCH SZ.6.5

## (undated) DEVICE — CRADLE EXTREMITY UNIVERSAL CONTOURED

## (undated) DEVICE — GLOVE SRG LF STRL BGL SKNSNS 6.5 PF

## (undated) DEVICE — SUT SILK 2-0 SH 30 IN K833H

## (undated) DEVICE — EYE PADS 1 5/8"X2 5/8": Brand: MCKESSON

## (undated) DEVICE — MICROSURGICAL INSTRUMENT ANTERIOR CHAMBER CANNULA 27GA: Brand: ALCON

## (undated) DEVICE — GLOVE INDICATOR PI UNDERGLOVE SZ 6.5 BLUE

## (undated) DEVICE — GAUZE SPONGES,USP TYPE VII GAUZE, 12 PLY: Brand: CURITY

## (undated) DEVICE — SYRINGE 10ML LL CONTROL TOP

## (undated) DEVICE — PACK CUSTOM EYE BASIC

## (undated) DEVICE — PVC URETHRAL CATHETER: Brand: DOVER

## (undated) DEVICE — MEDI-VAC YANKAUER SUCTION HANDLE W/BULBOUS AND CONTROL VENT: Brand: CARDINAL HEALTH